# Patient Record
Sex: FEMALE | Race: ASIAN | NOT HISPANIC OR LATINO
[De-identification: names, ages, dates, MRNs, and addresses within clinical notes are randomized per-mention and may not be internally consistent; named-entity substitution may affect disease eponyms.]

---

## 2017-12-26 ENCOUNTER — APPOINTMENT (OUTPATIENT)
Dept: VASCULAR SURGERY | Facility: CLINIC | Age: 72
End: 2017-12-26
Payer: MEDICARE

## 2017-12-26 VITALS
DIASTOLIC BLOOD PRESSURE: 80 MMHG | SYSTOLIC BLOOD PRESSURE: 146 MMHG | TEMPERATURE: 98.5 F | HEART RATE: 66 BPM | BODY MASS INDEX: 40.75 KG/M2 | HEIGHT: 63 IN | WEIGHT: 230 LBS

## 2017-12-26 DIAGNOSIS — Z98.890 OTHER SPECIFIED POSTPROCEDURAL STATES: ICD-10-CM

## 2017-12-26 PROCEDURE — 93970 EXTREMITY STUDY: CPT

## 2017-12-26 PROCEDURE — 99204 OFFICE O/P NEW MOD 45 MIN: CPT

## 2017-12-26 RX ORDER — OMEPRAZOLE 20 MG/1
20 CAPSULE, DELAYED RELEASE ORAL
Qty: 90 | Refills: 0 | Status: ACTIVE | COMMUNITY
Start: 2017-08-07

## 2017-12-26 RX ORDER — BLOOD SUGAR DIAGNOSTIC
STRIP MISCELLANEOUS
Qty: 100 | Refills: 0 | Status: ACTIVE | COMMUNITY
Start: 2017-11-03

## 2017-12-26 RX ORDER — ACETAMINOPHEN AND CODEINE 300; 30 MG/1; MG/1
300-30 TABLET ORAL
Qty: 20 | Refills: 0 | Status: ACTIVE | COMMUNITY
Start: 2017-10-06

## 2017-12-26 RX ORDER — SIMVASTATIN 40 MG/1
40 TABLET, FILM COATED ORAL
Qty: 90 | Refills: 0 | Status: ACTIVE | COMMUNITY
Start: 2017-09-28

## 2017-12-26 RX ORDER — PIOGLITAZONE HYDROCHLORIDE 15 MG/1
15 TABLET ORAL
Qty: 90 | Refills: 0 | Status: ACTIVE | COMMUNITY
Start: 2017-08-07

## 2017-12-26 RX ORDER — GABAPENTIN 100 MG/1
100 CAPSULE ORAL
Qty: 30 | Refills: 0 | Status: ACTIVE | COMMUNITY
Start: 2017-06-28

## 2017-12-26 RX ORDER — LINAGLIPTIN 5 MG/1
5 TABLET, FILM COATED ORAL
Qty: 90 | Refills: 0 | Status: ACTIVE | COMMUNITY
Start: 2017-08-07

## 2017-12-26 RX ORDER — LANCETS 32 GAUGE
EACH MISCELLANEOUS
Qty: 100 | Refills: 0 | Status: ACTIVE | COMMUNITY
Start: 2017-08-07

## 2017-12-26 RX ORDER — CICLOPIROX 7.7 MG/G
0.77 GEL TOPICAL
Qty: 200 | Refills: 0 | Status: ACTIVE | COMMUNITY
Start: 2017-11-03

## 2017-12-26 RX ORDER — TETANUS TOXOID, REDUCED DIPHTHERIA TOXOID AND ACELLULAR PERTUSSIS VACCINE, ADSORBED 5; 2.5; 8; 8; 2.5 [IU]/.5ML; [IU]/.5ML; UG/.5ML; UG/.5ML; UG/.5ML
5-2.5-18.5 SUSPENSION INTRAMUSCULAR
Qty: 1 | Refills: 0 | Status: ACTIVE | COMMUNITY
Start: 2017-08-09

## 2017-12-26 RX ORDER — GLIMEPIRIDE 4 MG/1
4 TABLET ORAL
Qty: 90 | Refills: 0 | Status: ACTIVE | COMMUNITY
Start: 2017-08-07

## 2017-12-26 RX ORDER — ATENOLOL 25 MG/1
25 TABLET ORAL
Qty: 360 | Refills: 0 | Status: ACTIVE | COMMUNITY
Start: 2017-11-03

## 2017-12-26 RX ORDER — ISOPROPYL ALCOHOL 70 ML/100ML
70 SWAB TOPICAL
Qty: 100 | Refills: 0 | Status: ACTIVE | COMMUNITY
Start: 2017-08-07

## 2017-12-26 RX ORDER — AMMONIUM LACTATE 12 %
12 CREAM (GRAM) TOPICAL
Qty: 385 | Refills: 0 | Status: ACTIVE | COMMUNITY
Start: 2017-07-24

## 2017-12-26 RX ORDER — ATENOLOL 50 MG/1
50 TABLET ORAL
Qty: 180 | Refills: 0 | Status: ACTIVE | COMMUNITY
Start: 2017-08-07

## 2017-12-26 RX ORDER — LISINOPRIL 10 MG/1
10 TABLET ORAL
Qty: 90 | Refills: 0 | Status: ACTIVE | COMMUNITY
Start: 2017-08-07

## 2017-12-26 RX ORDER — DICLOFENAC SODIUM 10 MG/G
1 GEL TOPICAL
Qty: 100 | Refills: 0 | Status: ACTIVE | COMMUNITY
Start: 2017-08-04

## 2017-12-26 RX ORDER — ALBUTEROL SULFATE 90 UG/1
108 (90 BASE) AEROSOL, METERED RESPIRATORY (INHALATION)
Qty: 8 | Refills: 0 | Status: ACTIVE | COMMUNITY
Start: 2017-06-28

## 2017-12-26 RX ORDER — BLOOD-GLUCOSE METER
W/DEVICE KIT MISCELLANEOUS
Qty: 1 | Refills: 0 | Status: ACTIVE | COMMUNITY
Start: 2017-08-07

## 2017-12-26 RX ORDER — AMLODIPINE BESYLATE 2.5 MG/1
2.5 TABLET ORAL
Qty: 90 | Refills: 0 | Status: ACTIVE | COMMUNITY
Start: 2017-08-07

## 2017-12-26 RX ORDER — LIDOCAINE HYDROCHLORIDE 20 MG/ML
2 SOLUTION OROPHARYNGEAL
Qty: 30 | Refills: 0 | Status: ACTIVE | COMMUNITY
Start: 2017-08-28

## 2017-12-26 RX ORDER — HYDROCORTISONE 25 MG/G
2.5 CREAM TOPICAL
Qty: 85 | Refills: 0 | Status: ACTIVE | COMMUNITY
Start: 2017-08-07

## 2017-12-26 RX ORDER — METRONIDAZOLE 500 MG/1
500 TABLET ORAL
Qty: 21 | Refills: 0 | Status: ACTIVE | COMMUNITY
Start: 2017-08-17

## 2017-12-26 RX ORDER — METFORMIN HYDROCHLORIDE 1000 MG/1
1000 TABLET, COATED ORAL
Qty: 180 | Refills: 0 | Status: ACTIVE | COMMUNITY
Start: 2017-11-03

## 2018-01-24 ENCOUNTER — APPOINTMENT (OUTPATIENT)
Dept: VASCULAR SURGERY | Facility: CLINIC | Age: 73
End: 2018-01-24

## 2018-01-31 ENCOUNTER — APPOINTMENT (OUTPATIENT)
Dept: VASCULAR SURGERY | Facility: CLINIC | Age: 73
End: 2018-01-31
Payer: MEDICARE

## 2018-01-31 VITALS
DIASTOLIC BLOOD PRESSURE: 78 MMHG | HEIGHT: 63 IN | SYSTOLIC BLOOD PRESSURE: 175 MMHG | WEIGHT: 230 LBS | TEMPERATURE: 98 F | HEART RATE: 75 BPM | BODY MASS INDEX: 40.75 KG/M2

## 2018-01-31 DIAGNOSIS — G47.62 SLEEP RELATED LEG CRAMPS: ICD-10-CM

## 2018-01-31 PROCEDURE — 99214 OFFICE O/P EST MOD 30 MIN: CPT

## 2018-01-31 PROCEDURE — 93923 UPR/LXTR ART STDY 3+ LVLS: CPT

## 2018-03-30 ENCOUNTER — APPOINTMENT (OUTPATIENT)
Dept: VASCULAR SURGERY | Facility: CLINIC | Age: 73
End: 2018-03-30
Payer: MEDICARE

## 2018-03-30 VITALS
SYSTOLIC BLOOD PRESSURE: 150 MMHG | HEART RATE: 76 BPM | DIASTOLIC BLOOD PRESSURE: 78 MMHG | HEIGHT: 63 IN | TEMPERATURE: 97.7 F | BODY MASS INDEX: 37.21 KG/M2 | WEIGHT: 210 LBS

## 2018-03-30 PROCEDURE — 99214 OFFICE O/P EST MOD 30 MIN: CPT

## 2018-06-15 ENCOUNTER — APPOINTMENT (OUTPATIENT)
Dept: VASCULAR SURGERY | Facility: CLINIC | Age: 73
End: 2018-06-15
Payer: MEDICARE

## 2018-06-15 VITALS
TEMPERATURE: 98 F | SYSTOLIC BLOOD PRESSURE: 163 MMHG | HEART RATE: 73 BPM | DIASTOLIC BLOOD PRESSURE: 79 MMHG | WEIGHT: 210 LBS | BODY MASS INDEX: 37.21 KG/M2 | HEIGHT: 63 IN

## 2018-06-15 DIAGNOSIS — I83.813 VARICOSE VEINS OF BILATERAL LOWER EXTREMITIES WITH PAIN: ICD-10-CM

## 2018-06-15 PROCEDURE — 99214 OFFICE O/P EST MOD 30 MIN: CPT

## 2018-06-15 RX ORDER — METOPROLOL SUCCINATE 100 MG/1
100 TABLET, EXTENDED RELEASE ORAL
Qty: 90 | Refills: 0 | Status: ACTIVE | COMMUNITY
Start: 2018-04-23

## 2018-06-15 RX ORDER — AZITHROMYCIN 250 MG/1
250 TABLET, FILM COATED ORAL
Qty: 6 | Refills: 0 | Status: ACTIVE | COMMUNITY
Start: 2018-05-10

## 2018-06-18 PROBLEM — I83.813 PAIN DUE TO VARICOSE VEINS OF BOTH LOWER EXTREMITIES: Status: ACTIVE | Noted: 2017-12-26

## 2018-07-01 ENCOUNTER — OUTPATIENT (OUTPATIENT)
Dept: OUTPATIENT SERVICES | Facility: HOSPITAL | Age: 73
LOS: 1 days | End: 2018-07-01
Payer: MEDICARE

## 2018-07-01 DIAGNOSIS — Z90.49 ACQUIRED ABSENCE OF OTHER SPECIFIED PARTS OF DIGESTIVE TRACT: Chronic | ICD-10-CM

## 2018-07-09 DIAGNOSIS — Z71.89 OTHER SPECIFIED COUNSELING: ICD-10-CM

## 2019-01-11 ENCOUNTER — APPOINTMENT (OUTPATIENT)
Dept: VASCULAR SURGERY | Facility: CLINIC | Age: 74
End: 2019-01-11

## 2019-01-23 ENCOUNTER — RX RENEWAL (OUTPATIENT)
Age: 74
End: 2019-01-23

## 2019-01-25 ENCOUNTER — APPOINTMENT (OUTPATIENT)
Dept: VASCULAR SURGERY | Facility: CLINIC | Age: 74
End: 2019-01-25

## 2019-03-13 ENCOUNTER — APPOINTMENT (OUTPATIENT)
Dept: VASCULAR SURGERY | Facility: CLINIC | Age: 74
End: 2019-03-13
Payer: MEDICARE

## 2019-03-13 VITALS
DIASTOLIC BLOOD PRESSURE: 77 MMHG | BODY MASS INDEX: 34.55 KG/M2 | HEART RATE: 81 BPM | WEIGHT: 195 LBS | HEIGHT: 63 IN | TEMPERATURE: 98 F | SYSTOLIC BLOOD PRESSURE: 192 MMHG

## 2019-03-13 PROCEDURE — 93923 UPR/LXTR ART STDY 3+ LVLS: CPT

## 2019-03-13 PROCEDURE — 99214 OFFICE O/P EST MOD 30 MIN: CPT

## 2019-03-13 NOTE — ASSESSMENT
[Arterial/Venous Disease] : arterial/venous disease [Other: _____] : [unfilled] [Foot care/Footwear] : foot care/footwear [FreeTextEntry1] : Impression  symptomatic arterial and venous insuff w clinical improvement \par \par med Conserv management leg elevation, knee high comp stockings  15-20mm Hg, wt loss, diet control, exercise program, protective measures \par continue pletal 50 bid \par ov w lamin/pvr s/o art insuff  12 mo  3/2020\par ov 6mo prn \par \par Varicose veins are enlarged, twisted veins. Varicose veins are caused by increased blood pressure in the veins.  The blood moves towards the heart by 1-way valves in the veins. When the valves become weakened or damaged, blood can collect in the veins and pool in your lower legs (ankles). This causes the veins to become enlarged and incompetent with reflux. Sitting or standing for long periods can cause blood to pool in the leg veins, increasing the pressure within the veins. \par Risk factors for varicose veins or venous disease may include:  obesity, older age, standing or sitting for prolonged periods of time for several years, being female, pregnancy, taking oral contraceptive pills or hormone replacement, being inactive, and/or smoking. \par The most common symptoms of varicose veins are sensations in the legs, such as a heavy feeling, burning, and/or aching. However, each individual may experience symptoms differently.  Other symptoms may include:  color changes in the skin, sores on the legs, or rash.  Severe varicose veins or venous disease may eventually produce long-term mild swelling that can result in more serious skin and tissue problems, such as ulcers and non-healing sores.\par Varicose veins and venous disease are diagnosed by a complete medical history, physical examination, and diagnostic studies for varicose veins including duplex ultrasound and color-flow imaging.  \par Medical treatment for varicose veins and venous disease include:  compression stockings, sclerotherapy, endovenous ablation and/or surgical treatment with microphlebectomy.  \par \par  [Medication Management] : medication management

## 2019-03-13 NOTE — DATA REVIEWED
[FreeTextEntry1] : 12/26/2017 venous doppler Mikey le no acute dvt svt RLE GSV  ablated  LLE no sono evidence of reflux \par \par 1/31/2018 ROBBIE/PVR mild infragenic art insuff w vessel calcification rt robbie NC lt robbie NC \par \par 3/13/2019 ROBBIE/PVR mild infragenic art insuff w vessel calcification rt robbie NC lt robbie 1.17

## 2019-03-13 NOTE — PHYSICAL EXAM
[Normal Breath Sounds] : Normal breath sounds [Right Carotid Bruit] : right carotid bruit heard [2+] : left 2+ [1+] : left 1+ [Ankle Swelling (On Exam)] : present [Ankle Swelling Bilaterally] : bilaterally  [Varicose Veins Of Lower Extremities] : bilaterally [] : bilaterally [Ankle Swelling On The Right] : mild [No HSM] : no hepatosplenomegaly [No Rash or Lesion] : No rash or lesion [Alert] : alert [Oriented to Person] : oriented to person [Oriented to Place] : oriented to place [Oriented to Time] : oriented to time [Calm] : calm [JVD] : no jugular venous distention  [Abdomen Masses] : No abdominal masses [Tender] : was nontender [Stool Sample Taken] : No stool obtained  on rectal exam [de-identified] : nad [de-identified] : wnl [FreeTextEntry1] : mod art insuff w mod trophic skin changes\par no ulcers\par mild to mod venous insuff\par w mild to  moderate bilateral leg stasis dermatitis \par and mild  bilateral leg edema \par Multiple  bilateral leg small  varicose  veins and spider veins calf and shin\par no wounds/ulcers\par  [de-identified] : wnl [de-identified] : wnl [de-identified] : cn 2-12 elisha grossly intact [de-identified] : cooperative

## 2019-03-13 NOTE — HISTORY OF PRESENT ILLNESS
[FreeTextEntry1] : pt states elisha le noct leg and foot  cramps have  sig improved and nearly resolved  in intensity  since last ov\par toes color has also improved \par pt is compliant w comp stockings\par pt is on pletal  50 mg bid

## 2019-05-30 ENCOUNTER — EMERGENCY (EMERGENCY)
Facility: HOSPITAL | Age: 74
LOS: 1 days | Discharge: HOME | End: 2019-05-30
Attending: EMERGENCY MEDICINE
Payer: COMMERCIAL

## 2019-05-30 VITALS
WEIGHT: 195.11 LBS | OXYGEN SATURATION: 98 % | TEMPERATURE: 97 F | DIASTOLIC BLOOD PRESSURE: 80 MMHG | HEART RATE: 84 BPM | RESPIRATION RATE: 16 BRPM | SYSTOLIC BLOOD PRESSURE: 203 MMHG

## 2019-05-30 DIAGNOSIS — Z90.49 ACQUIRED ABSENCE OF OTHER SPECIFIED PARTS OF DIGESTIVE TRACT: Chronic | ICD-10-CM

## 2019-05-30 LAB
ALBUMIN SERPL ELPH-MCNC: 3.1 G/DL — LOW (ref 3.5–5)
ALP SERPL-CCNC: 61 U/L — SIGNIFICANT CHANGE UP (ref 40–120)
ALT FLD-CCNC: 20 U/L DA — SIGNIFICANT CHANGE UP (ref 10–60)
ANION GAP SERPL CALC-SCNC: 8 MMOL/L — SIGNIFICANT CHANGE UP (ref 5–17)
AST SERPL-CCNC: 22 U/L — SIGNIFICANT CHANGE UP (ref 10–40)
BASOPHILS # BLD AUTO: 0.05 K/UL — SIGNIFICANT CHANGE UP (ref 0–0.2)
BASOPHILS NFR BLD AUTO: 0.4 % — SIGNIFICANT CHANGE UP (ref 0–2)
BILIRUB SERPL-MCNC: 0.4 MG/DL — SIGNIFICANT CHANGE UP (ref 0.2–1.2)
BUN SERPL-MCNC: 15 MG/DL — SIGNIFICANT CHANGE UP (ref 7–18)
CALCIUM SERPL-MCNC: 8.4 MG/DL — SIGNIFICANT CHANGE UP (ref 8.4–10.5)
CHLORIDE SERPL-SCNC: 98 MMOL/L — SIGNIFICANT CHANGE UP (ref 96–108)
CK SERPL-CCNC: 70 U/L — SIGNIFICANT CHANGE UP (ref 21–215)
CO2 SERPL-SCNC: 27 MMOL/L — SIGNIFICANT CHANGE UP (ref 22–31)
CREAT SERPL-MCNC: 0.94 MG/DL — SIGNIFICANT CHANGE UP (ref 0.5–1.3)
EOSINOPHIL # BLD AUTO: 0.03 K/UL — SIGNIFICANT CHANGE UP (ref 0–0.5)
EOSINOPHIL NFR BLD AUTO: 0.2 % — SIGNIFICANT CHANGE UP (ref 0–6)
GLUCOSE SERPL-MCNC: 261 MG/DL — HIGH (ref 70–99)
HCT VFR BLD CALC: 35.2 % — SIGNIFICANT CHANGE UP (ref 34.5–45)
HGB BLD-MCNC: 11.1 G/DL — LOW (ref 11.5–15.5)
IMM GRANULOCYTES NFR BLD AUTO: 0.8 % — SIGNIFICANT CHANGE UP (ref 0–1.5)
LIDOCAIN IGE QN: 164 U/L — SIGNIFICANT CHANGE UP (ref 73–393)
LYMPHOCYTES # BLD AUTO: 1.4 K/UL — SIGNIFICANT CHANGE UP (ref 1–3.3)
LYMPHOCYTES # BLD AUTO: 11.1 % — LOW (ref 13–44)
MAGNESIUM SERPL-MCNC: 1.2 MG/DL — LOW (ref 1.6–2.6)
MCHC RBC-ENTMCNC: 26.6 PG — LOW (ref 27–34)
MCHC RBC-ENTMCNC: 31.5 GM/DL — LOW (ref 32–36)
MCV RBC AUTO: 84.4 FL — SIGNIFICANT CHANGE UP (ref 80–100)
MONOCYTES # BLD AUTO: 0.54 K/UL — SIGNIFICANT CHANGE UP (ref 0–0.9)
MONOCYTES NFR BLD AUTO: 4.3 % — SIGNIFICANT CHANGE UP (ref 2–14)
NEUTROPHILS # BLD AUTO: 10.53 K/UL — HIGH (ref 1.8–7.4)
NEUTROPHILS NFR BLD AUTO: 83.2 % — HIGH (ref 43–77)
NRBC # BLD: 0 /100 WBCS — SIGNIFICANT CHANGE UP (ref 0–0)
PLATELET # BLD AUTO: 326 K/UL — SIGNIFICANT CHANGE UP (ref 150–400)
POTASSIUM SERPL-MCNC: 5.2 MMOL/L — SIGNIFICANT CHANGE UP (ref 3.5–5.3)
POTASSIUM SERPL-SCNC: 5.2 MMOL/L — SIGNIFICANT CHANGE UP (ref 3.5–5.3)
PROT SERPL-MCNC: 7.4 G/DL — SIGNIFICANT CHANGE UP (ref 6–8.3)
RBC # BLD: 4.17 M/UL — SIGNIFICANT CHANGE UP (ref 3.8–5.2)
RBC # FLD: 13.2 % — SIGNIFICANT CHANGE UP (ref 10.3–14.5)
SODIUM SERPL-SCNC: 133 MMOL/L — LOW (ref 135–145)
TROPONIN I SERPL-MCNC: <0.015 NG/ML — SIGNIFICANT CHANGE UP (ref 0–0.04)
WBC # BLD: 12.65 K/UL — HIGH (ref 3.8–10.5)
WBC # FLD AUTO: 12.65 K/UL — HIGH (ref 3.8–10.5)

## 2019-05-30 PROCEDURE — 99284 EMERGENCY DEPT VISIT MOD MDM: CPT

## 2019-05-30 RX ORDER — SODIUM CHLORIDE 9 MG/ML
1000 INJECTION INTRAMUSCULAR; INTRAVENOUS; SUBCUTANEOUS ONCE
Refills: 0 | Status: COMPLETED | OUTPATIENT
Start: 2019-05-30 | End: 2019-05-30

## 2019-05-30 RX ORDER — ONDANSETRON 8 MG/1
4 TABLET, FILM COATED ORAL ONCE
Refills: 0 | Status: COMPLETED | OUTPATIENT
Start: 2019-05-30 | End: 2019-05-30

## 2019-05-30 RX ADMIN — SODIUM CHLORIDE 1000 MILLILITER(S): 9 INJECTION INTRAMUSCULAR; INTRAVENOUS; SUBCUTANEOUS at 21:49

## 2019-05-30 RX ADMIN — ONDANSETRON 4 MILLIGRAM(S): 8 TABLET, FILM COATED ORAL at 22:30

## 2019-05-30 RX ADMIN — SODIUM CHLORIDE 1000 MILLILITER(S): 9 INJECTION INTRAMUSCULAR; INTRAVENOUS; SUBCUTANEOUS at 22:31

## 2019-05-30 RX ADMIN — SODIUM CHLORIDE 1000 MILLILITER(S): 9 INJECTION INTRAMUSCULAR; INTRAVENOUS; SUBCUTANEOUS at 23:00

## 2019-05-30 NOTE — ED PROVIDER NOTE - OBJECTIVE STATEMENT
74 year old female PMH HTN, HLD, DM coming in for 1 day of vague generalized abdominal pain, nausea, 6 episodes of nbnb vomiting, and many episodes of watery nonbloody diarrhea. states unable to tolerate PO and wasn't able to take her medications 2/2 N/V. Denies fevers, chills, sweats, cp, sob, palpitations, urinary complaints. states feels weak. surgical history of hysterectomy, cholecystectomy, hernia repair.

## 2019-05-30 NOTE — ED ADULT TRIAGE NOTE - TEMPERATURE IN CELSIUS (DEGREES C)
36.3 Problem: Patient Care Overview (Adult)  Goal: Plan of Care Review  Outcome: Ongoing (interventions implemented as appropriate)    12/17/17 0696   Coping/Psychosocial Response Interventions   Plan Of Care Reviewed With patient;spouse   Patient Care Overview   Progress improving   Outcome Evaluation   Outcome Summary/Follow up Plan Pt admitted from ER for DKA. Insulin gtt titrated per protocol, potassium replaced per protocol. VSS, CTM.

## 2019-05-30 NOTE — ED PROVIDER NOTE - CLINICAL SUMMARY MEDICAL DECISION MAKING FREE TEXT BOX
74 year old female with abd pain and N/V/D. hypertensive. other vitals WNL. Pe as above.  labs, ua, zofran, fluid bolus, ct abdomen, reassess 74 year old female with abd pain and N/V/D. hypertensive. other vitals WNL. Pe as above.  labs, ua, zofran, fluid bolus, ct abdomen, reassess  labs are unremarkable. ct abdomen with enteritis. after fluid bolus, zofran feels improved. tolerating PO solids and liquids in the ed. symptoms likely 2/2 gastroenteritis. will dc home. f/u with PMD. return precautions given.

## 2019-05-31 VITALS
DIASTOLIC BLOOD PRESSURE: 54 MMHG | OXYGEN SATURATION: 100 % | RESPIRATION RATE: 16 BRPM | SYSTOLIC BLOOD PRESSURE: 121 MMHG | TEMPERATURE: 98 F | HEART RATE: 90 BPM

## 2019-05-31 PROCEDURE — 82550 ASSAY OF CK (CPK): CPT

## 2019-05-31 PROCEDURE — 99284 EMERGENCY DEPT VISIT MOD MDM: CPT | Mod: 25

## 2019-05-31 PROCEDURE — 84484 ASSAY OF TROPONIN QUANT: CPT

## 2019-05-31 PROCEDURE — 96374 THER/PROPH/DIAG INJ IV PUSH: CPT | Mod: XU

## 2019-05-31 PROCEDURE — 96361 HYDRATE IV INFUSION ADD-ON: CPT

## 2019-05-31 PROCEDURE — 74177 CT ABD & PELVIS W/CONTRAST: CPT

## 2019-05-31 PROCEDURE — 85027 COMPLETE CBC AUTOMATED: CPT

## 2019-05-31 PROCEDURE — 80053 COMPREHEN METABOLIC PANEL: CPT

## 2019-05-31 PROCEDURE — 36415 COLL VENOUS BLD VENIPUNCTURE: CPT

## 2019-05-31 PROCEDURE — 83735 ASSAY OF MAGNESIUM: CPT

## 2019-05-31 PROCEDURE — 74177 CT ABD & PELVIS W/CONTRAST: CPT | Mod: 26

## 2019-05-31 PROCEDURE — 83690 ASSAY OF LIPASE: CPT

## 2019-05-31 RX ADMIN — SODIUM CHLORIDE 1000 MILLILITER(S): 9 INJECTION INTRAMUSCULAR; INTRAVENOUS; SUBCUTANEOUS at 03:02

## 2019-08-28 ENCOUNTER — APPOINTMENT (OUTPATIENT)
Dept: VASCULAR SURGERY | Facility: CLINIC | Age: 74
End: 2019-08-28
Payer: MEDICARE

## 2019-08-28 VITALS
WEIGHT: 186 LBS | HEIGHT: 63 IN | TEMPERATURE: 98 F | BODY MASS INDEX: 32.96 KG/M2 | SYSTOLIC BLOOD PRESSURE: 168 MMHG | HEART RATE: 70 BPM | DIASTOLIC BLOOD PRESSURE: 77 MMHG

## 2019-08-28 PROCEDURE — 99214 OFFICE O/P EST MOD 30 MIN: CPT

## 2019-08-28 RX ORDER — CIPROFLOXACIN HYDROCHLORIDE 500 MG/1
500 TABLET, FILM COATED ORAL
Qty: 10 | Refills: 1 | Status: ACTIVE | COMMUNITY
Start: 2019-08-28 | End: 1900-01-01

## 2019-08-28 NOTE — PHYSICAL EXAM
[Normal Breath Sounds] : Normal breath sounds [Right Carotid Bruit] : right carotid bruit heard [2+] : left 2+ [1+] : left 1+ [Ankle Swelling (On Exam)] : present [Ankle Swelling Bilaterally] : bilaterally  [Varicose Veins Of Lower Extremities] : bilaterally [] : bilaterally [Ankle Swelling On The Right] : mild [No HSM] : no hepatosplenomegaly [No Rash or Lesion] : No rash or lesion [Oriented to Person] : oriented to person [Alert] : alert [Oriented to Place] : oriented to place [Oriented to Time] : oriented to time [Calm] : calm [JVD] : no jugular venous distention  [Abdomen Masses] : No abdominal masses [Tender] : was nontender [Stool Sample Taken] : No stool obtained  on rectal exam [de-identified] : nad [de-identified] : wnl [FreeTextEntry1] : mod art insuff w mod trophic skin changes\par rt toe 1 tip ulcer 3mm diam w minimal serous draiange \par mild to mod venous insuff\par w mild to  moderate bilateral leg stasis dermatitis \par and mild  bilateral leg edema \par Multiple  bilateral leg small  varicose  veins and spider veins calf and shin\par  [de-identified] : cn 2-12 elisha grossly intact [de-identified] : wnl [de-identified] : wnl [de-identified] : cooperative

## 2019-08-28 NOTE — HISTORY OF PRESENT ILLNESS
[FreeTextEntry1] : pt states elisha le noct leg and foot  cramps have  sig improved and nearly resolved  in intensity  since last ov\par toes color has also improved \par pt is compliant w comp stockings\par pt is on pletal  50 mg bid  [de-identified] : pt states 3 weeks ago rt toe 1 tip debridment by podiatrist and that it is feeling better\par pt is receiving woundcare by podiatrist\par pt reports mild discomfort\par pt has completed po cipro  rx \par pt is on pletal 50 bid

## 2019-08-28 NOTE — ASSESSMENT
[Arterial/Venous Disease] : arterial/venous disease [Medication Management] : medication management [Other: _____] : [unfilled] [Foot care/Footwear] : foot care/footwear [FreeTextEntry1] : Impression  symptomatic arterial w new rt toe 1 ulcer \par \par med Conserv management leg elevation, knee high comp stockings  15-20mm Hg, wt loss, diet control, exercise program, protective measures \par continue pletal 50 bid \par restart po cipro for an additional 7 days \par ov w lamin/pvr s/o art insuff  12 mo  3/2020\par ov 1 week to re eval\par Indications risks and benefits of  le angiogram, possible baloon angioplasty and stent, leg bypass were explained to pt who understands\par d/w pt that i will re eval rt toe wound and based on this will d/w pt  above angio indications \par \par \par Varicose veins are enlarged, twisted veins. Varicose veins are caused by increased blood pressure in the veins.  The blood moves towards the heart by 1-way valves in the veins. When the valves become weakened or damaged, blood can collect in the veins and pool in your lower legs (ankles). This causes the veins to become enlarged and incompetent with reflux. Sitting or standing for long periods can cause blood to pool in the leg veins, increasing the pressure within the veins. \par Risk factors for varicose veins or venous disease may include:  obesity, older age, standing or sitting for prolonged periods of time for several years, being female, pregnancy, taking oral contraceptive pills or hormone replacement, being inactive, and/or smoking. \par The most common symptoms of varicose veins are sensations in the legs, such as a heavy feeling, burning, and/or aching. However, each individual may experience symptoms differently.  Other symptoms may include:  color changes in the skin, sores on the legs, or rash.  Severe varicose veins or venous disease may eventually produce long-term mild swelling that can result in more serious skin and tissue problems, such as ulcers and non-healing sores.\par Varicose veins and venous disease are diagnosed by a complete medical history, physical examination, and diagnostic studies for varicose veins including duplex ultrasound and color-flow imaging.  \par Medical treatment for varicose veins and venous disease include:  compression stockings, sclerotherapy, endovenous ablation and/or surgical treatment with microphlebectomy.  \par \par

## 2019-09-06 ENCOUNTER — APPOINTMENT (OUTPATIENT)
Dept: VASCULAR SURGERY | Facility: CLINIC | Age: 74
End: 2019-09-06
Payer: MEDICARE

## 2019-09-06 VITALS
HEART RATE: 80 BPM | SYSTOLIC BLOOD PRESSURE: 160 MMHG | WEIGHT: 186 LBS | HEIGHT: 63 IN | BODY MASS INDEX: 32.96 KG/M2 | DIASTOLIC BLOOD PRESSURE: 80 MMHG | TEMPERATURE: 98.1 F

## 2019-09-06 PROCEDURE — 99214 OFFICE O/P EST MOD 30 MIN: CPT

## 2019-09-06 NOTE — ASSESSMENT
[Arterial/Venous Disease] : arterial/venous disease [Medication Management] : medication management [Other: _____] : [unfilled] [Foot care/Footwear] : foot care/footwear [FreeTextEntry1] : Impression  symptomatic arterial  insuff w healed wound \par \par med Conserv management leg elevation, knee high comp stockings  15-20mm Hg, wt loss, diet control, exercise program, protective measures \par at this time le angio is not indicated ,lina hold off \par continue pletal 50 bid \par ov w lamin/pvr s/o art insuff  12 mo  3/2020\par ov 2-3 mo to re eval\par rto if any new wounds \par \par \par \par Varicose veins are enlarged, twisted veins. Varicose veins are caused by increased blood pressure in the veins.  The blood moves towards the heart by 1-way valves in the veins. When the valves become weakened or damaged, blood can collect in the veins and pool in your lower legs (ankles). This causes the veins to become enlarged and incompetent with reflux. Sitting or standing for long periods can cause blood to pool in the leg veins, increasing the pressure within the veins. \par Risk factors for varicose veins or venous disease may include:  obesity, older age, standing or sitting for prolonged periods of time for several years, being female, pregnancy, taking oral contraceptive pills or hormone replacement, being inactive, and/or smoking. \par The most common symptoms of varicose veins are sensations in the legs, such as a heavy feeling, burning, and/or aching. However, each individual may experience symptoms differently.  Other symptoms may include:  color changes in the skin, sores on the legs, or rash.  Severe varicose veins or venous disease may eventually produce long-term mild swelling that can result in more serious skin and tissue problems, such as ulcers and non-healing sores.\par Varicose veins and venous disease are diagnosed by a complete medical history, physical examination, and diagnostic studies for varicose veins including duplex ultrasound and color-flow imaging.  \par Medical treatment for varicose veins and venous disease include:  compression stockings, sclerotherapy, endovenous ablation and/or surgical treatment with microphlebectomy.  \par \par

## 2019-09-06 NOTE — PHYSICAL EXAM
[Normal Breath Sounds] : Normal breath sounds [Right Carotid Bruit] : right carotid bruit heard [2+] : left 2+ [1+] : left 1+ [Ankle Swelling (On Exam)] : present [Ankle Swelling Bilaterally] : bilaterally  [Varicose Veins Of Lower Extremities] : bilaterally [] : bilaterally [Ankle Swelling On The Right] : mild [No HSM] : no hepatosplenomegaly [No Rash or Lesion] : No rash or lesion [Alert] : alert [Oriented to Person] : oriented to person [Oriented to Place] : oriented to place [Oriented to Time] : oriented to time [Calm] : calm [JVD] : no jugular venous distention  [Abdomen Masses] : No abdominal masses [Tender] : was nontender [Stool Sample Taken] : No stool obtained  on rectal exam [de-identified] : nad [de-identified] : wnl [FreeTextEntry1] : mod art insuff w mod trophic skin changes\par rt toe 1 tip ulcer healed \par mild to mod venous insuff\par w mild to  moderate bilateral leg stasis dermatitis \par and mild  bilateral leg edema \par Multiple  bilateral leg small  varicose  veins and spider veins calf and shin\par  [de-identified] : wnl [de-identified] : wnl [de-identified] : cn 2-12 elisha grossly intact [de-identified] : cooperative

## 2019-11-08 ENCOUNTER — APPOINTMENT (OUTPATIENT)
Dept: VASCULAR SURGERY | Facility: CLINIC | Age: 74
End: 2019-11-08

## 2020-01-06 ENCOUNTER — RX RENEWAL (OUTPATIENT)
Age: 75
End: 2020-01-06

## 2020-01-06 RX ORDER — CILOSTAZOL 50 MG/1
50 TABLET ORAL
Qty: 180 | Refills: 3 | Status: ACTIVE | COMMUNITY
Start: 2018-01-31 | End: 1900-01-01

## 2020-01-09 ENCOUNTER — APPOINTMENT (OUTPATIENT)
Dept: CT IMAGING | Facility: IMAGING CENTER | Age: 75
End: 2020-01-09

## 2020-01-10 RX ORDER — CILOSTAZOL 50 MG/1
50 TABLET ORAL
Qty: 180 | Refills: 3 | Status: ACTIVE | COMMUNITY
Start: 2020-01-10 | End: 1900-01-01

## 2020-01-22 ENCOUNTER — OUTPATIENT (OUTPATIENT)
Dept: OUTPATIENT SERVICES | Facility: HOSPITAL | Age: 75
LOS: 1 days | End: 2020-01-22
Payer: COMMERCIAL

## 2020-01-22 ENCOUNTER — APPOINTMENT (OUTPATIENT)
Dept: CT IMAGING | Facility: IMAGING CENTER | Age: 75
End: 2020-01-22
Payer: MEDICARE

## 2020-01-22 DIAGNOSIS — Z90.49 ACQUIRED ABSENCE OF OTHER SPECIFIED PARTS OF DIGESTIVE TRACT: Chronic | ICD-10-CM

## 2020-01-22 DIAGNOSIS — Z00.8 ENCOUNTER FOR OTHER GENERAL EXAMINATION: ICD-10-CM

## 2020-01-22 PROCEDURE — 74177 CT ABD & PELVIS W/CONTRAST: CPT

## 2020-01-22 PROCEDURE — 82565 ASSAY OF CREATININE: CPT

## 2020-01-22 PROCEDURE — 74177 CT ABD & PELVIS W/CONTRAST: CPT | Mod: 26

## 2020-06-03 ENCOUNTER — APPOINTMENT (OUTPATIENT)
Dept: VASCULAR SURGERY | Facility: CLINIC | Age: 75
End: 2020-06-03

## 2020-06-22 ENCOUNTER — APPOINTMENT (OUTPATIENT)
Dept: VASCULAR SURGERY | Facility: CLINIC | Age: 75
End: 2020-06-22
Payer: MEDICARE

## 2020-06-22 PROCEDURE — 93923 UPR/LXTR ART STDY 3+ LVLS: CPT

## 2020-06-24 ENCOUNTER — APPOINTMENT (OUTPATIENT)
Dept: VASCULAR SURGERY | Facility: CLINIC | Age: 75
End: 2020-06-24
Payer: MEDICARE

## 2020-06-24 PROCEDURE — 99213 OFFICE O/P EST LOW 20 MIN: CPT

## 2020-06-24 RX ORDER — CILOSTAZOL 50 MG/1
50 TABLET ORAL
Qty: 180 | Refills: 3 | Status: ACTIVE | COMMUNITY
Start: 2020-06-24 | End: 1900-01-01

## 2020-06-24 NOTE — ASSESSMENT
[FreeTextEntry1] : Impression  symptomatic arterial  insuff w clinical improvement , stable venous insff sx\par \par med Conserv management leg elevation, knee high comp stockings  15-20mm Hg, wt loss, diet control, exercise program, protective measures \par continue pletal 50 bid \par ov w lamin/pvr s/o art insuff  12 mo  6/2021\par ov 6mo dec 2020 \par telephonic visit 10 min \par \par \par Varicose veins are enlarged, twisted veins. Varicose veins are caused by increased blood pressure in the veins.  The blood moves towards the heart by 1-way valves in the veins. When the valves become weakened or damaged, blood can collect in the veins and pool in your lower legs (ankles). This causes the veins to become enlarged and incompetent with reflux. Sitting or standing for long periods can cause blood to pool in the leg veins, increasing the pressure within the veins. \par Risk factors for varicose veins or venous disease may include:  obesity, older age, standing or sitting for prolonged periods of time for several years, being female, pregnancy, taking oral contraceptive pills or hormone replacement, being inactive, and/or smoking. \par The most common symptoms of varicose veins are sensations in the legs, such as a heavy feeling, burning, and/or aching. However, each individual may experience symptoms differently.  Other symptoms may include:  color changes in the skin, sores on the legs, or rash.  Severe varicose veins or venous disease may eventually produce long-term mild swelling that can result in more serious skin and tissue problems, such as ulcers and non-healing sores.\par Varicose veins and venous disease are diagnosed by a complete medical history, physical examination, and diagnostic studies for varicose veins including duplex ultrasound and color-flow imaging.  \par Medical treatment for varicose veins and venous disease include:  compression stockings, sclerotherapy, endovenous ablation and/or surgical treatment with microphlebectomy.  \par \par  [Arterial/Venous Disease] : arterial/venous disease [Other: _____] : [unfilled] [Medication Management] : medication management [Foot care/Footwear] : foot care/footwear

## 2020-06-24 NOTE — DATA REVIEWED
[FreeTextEntry1] : 12/26/2017 venous doppler Mikey le no acute dvt svt RLE GSV  ablated  LLE no sono evidence of reflux \par \par 1/31/2018 ROBBIE/PVR mild infragenic art insuff w vessel calcification rt robbie NC lt robbie NC \par \par 3/13/2019 ROBBIE/PVR mild infragenic art insuff w vessel calcification rt robbie NC lt robbie 1.17\par \par 6/22/2020 ROBBIE/PVR mild infragenic art insuff w vessel calcification RT robbie NC LT robbie 1.22\par

## 2020-06-24 NOTE — HISTORY OF PRESENT ILLNESS
[FreeTextEntry1] : pt is on pletal 50 bid\par pt states elisha le and foot nocturnal  cramps have sig improved \par pt is compliant w comp stockings\par pt states no le swelling or discomfort  c/o at this time

## 2020-06-24 NOTE — REASON FOR VISIT
[Home] : at home, [unfilled] , at the time of the visit. [Medical Office: ([facility])___] : at the medical office located in  [Other:____] : [unfilled] [Verbal consent obtained from patient] : the patient, [unfilled] [FreeTextEntry1] : My legs bother me

## 2020-12-29 ENCOUNTER — APPOINTMENT (OUTPATIENT)
Dept: VASCULAR SURGERY | Facility: CLINIC | Age: 75
End: 2020-12-29
Payer: MEDICARE

## 2020-12-29 VITALS
BODY MASS INDEX: 32.78 KG/M2 | HEART RATE: 83 BPM | DIASTOLIC BLOOD PRESSURE: 78 MMHG | HEIGHT: 63 IN | SYSTOLIC BLOOD PRESSURE: 186 MMHG | TEMPERATURE: 98 F | WEIGHT: 185 LBS

## 2020-12-29 VITALS — DIASTOLIC BLOOD PRESSURE: 79 MMHG | SYSTOLIC BLOOD PRESSURE: 158 MMHG | HEART RATE: 82 BPM

## 2020-12-29 PROCEDURE — 99214 OFFICE O/P EST MOD 30 MIN: CPT

## 2020-12-29 PROCEDURE — 99072 ADDL SUPL MATRL&STAF TM PHE: CPT

## 2020-12-29 RX ORDER — MULTIVITAMIN WITH FOLIC ACID 400 MCG
TABLET ORAL
Qty: 90 | Refills: 0 | Status: ACTIVE | COMMUNITY
Start: 2020-12-18

## 2020-12-29 RX ORDER — LORATADINE 10 MG/1
10 TABLET ORAL
Qty: 30 | Refills: 0 | Status: ACTIVE | COMMUNITY
Start: 2020-12-18

## 2020-12-29 RX ORDER — CICLOPIROX OLAMINE 7.7 MG/G
0.77 CREAM TOPICAL
Qty: 180 | Refills: 0 | Status: ACTIVE | COMMUNITY
Start: 2020-12-18

## 2020-12-29 RX ORDER — CILOSTAZOL 50 MG/1
50 TABLET ORAL
Qty: 180 | Refills: 3 | Status: ACTIVE | COMMUNITY
Start: 2020-12-29 | End: 1900-01-01

## 2020-12-29 RX ORDER — CALCIUM CARBONATE/VITAMIN D3 500MG-5MCG
500-200 TABLET ORAL
Qty: 180 | Refills: 0 | Status: ACTIVE | COMMUNITY
Start: 2020-12-18

## 2020-12-29 RX ORDER — MUPIROCIN 20 MG/G
2 OINTMENT TOPICAL
Qty: 22 | Refills: 0 | Status: ACTIVE | COMMUNITY
Start: 2020-10-12

## 2020-12-29 NOTE — ASSESSMENT
[Arterial/Venous Disease] : arterial/venous disease [Medication Management] : medication management [Other: _____] : [unfilled] [Foot care/Footwear] : foot care/footwear [FreeTextEntry1] : Impression  symptomatic arterial  insuff w clinical improvement , stable venous insff sx\par \par med Conserv management leg elevation, knee high comp stockings  15-20mm Hg, wt loss, diet control, exercise program, protective measures \par continue pletal 50 bid \par ov w lamin/pvr s/o art insuff  12 mo  6/2021\par \par \par Varicose veins are enlarged, twisted veins. Varicose veins are caused by increased blood pressure in the veins.  The blood moves towards the heart by 1-way valves in the veins. When the valves become weakened or damaged, blood can collect in the veins and pool in your lower legs (ankles). This causes the veins to become enlarged and incompetent with reflux. Sitting or standing for long periods can cause blood to pool in the leg veins, increasing the pressure within the veins. \par Risk factors for varicose veins or venous disease may include:  obesity, older age, standing or sitting for prolonged periods of time for several years, being female, pregnancy, taking oral contraceptive pills or hormone replacement, being inactive, and/or smoking. \par The most common symptoms of varicose veins are sensations in the legs, such as a heavy feeling, burning, and/or aching. However, each individual may experience symptoms differently.  Other symptoms may include:  color changes in the skin, sores on the legs, or rash.  Severe varicose veins or venous disease may eventually produce long-term mild swelling that can result in more serious skin and tissue problems, such as ulcers and non-healing sores.\par Varicose veins and venous disease are diagnosed by a complete medical history, physical examination, and diagnostic studies for varicose veins including duplex ultrasound and color-flow imaging.  \par Medical treatment for varicose veins and venous disease include:  compression stockings, sclerotherapy, endovenous ablation and/or surgical treatment with microphlebectomy.  \par \par

## 2020-12-29 NOTE — HISTORY OF PRESENT ILLNESS
[FreeTextEntry1] : pt is on pletal 50 bid\par pt states elisha le and foot nocturnal  cramps have sig improved \par pt is compliant w comp stockings\par pt states no le swelling or discomfort  c/o at this time  [de-identified] : pt is on pletal 50 bid\par pt states elisha le and foot nocturnal  cramps have sig improved  and nearly resolved\par pt is compliant w comp stockings\par pt states minimal le swelling or discomfort since last ov

## 2020-12-29 NOTE — REASON FOR VISIT
[Home] : at home, [unfilled] , at the time of the visit. [Medical Office: ([facility])___] : at the medical office located in  [Other:____] : [unfilled] [Verbal consent obtained from patient] : the patient, [unfilled] [FreeTextEntry1] : My legs bother me less

## 2020-12-29 NOTE — PHYSICAL EXAM
[JVD] : no jugular venous distention  [Normal Breath Sounds] : Normal breath sounds [Right Carotid Bruit] : no bruit heard over the right carotid [Left Carotid Bruit] : no bruit heard over the left carotid [2+] : left 2+ [1+] : left 1+ [Ankle Swelling (On Exam)] : present [Ankle Swelling Bilaterally] : bilaterally  [Varicose Veins Of Lower Extremities] : bilaterally [] : bilaterally [Ankle Swelling On The Right] : mild [Abdomen Masses] : No abdominal masses [No HSM] : no hepatosplenomegaly [Tender] : was nontender [Stool Sample Taken] : No stool obtained  on rectal exam [No Rash or Lesion] : No rash or lesion [Alert] : alert [Oriented to Person] : oriented to person [Oriented to Place] : oriented to place [Oriented to Time] : oriented to time [Calm] : calm [de-identified] : nad [de-identified] : wnl [FreeTextEntry1] : Mild arterial insufficiency w mild  trophic skin changes \par Mild bilateral leg venous insufficiency \par w moderate bilateral leg stasis dermatitis, hyperpigmentation in the gator area, lipodermatosclerosis, hyperkeratosis (skin thickening)\par and mild to moderate bilateral leg edema \par Multiple  bilateral leg small varicose  veins and spider veins  ant post medial calf and shin \par RLE Varicose veins measuring 1-2  mm in size on the calf /shin \par LLE Varicose veins measuring 1-2  mm in size on the calf /shin \par no wounds/ulcers\par  [de-identified] : wnl [de-identified] : wnl [de-identified] : Mikey Cranial nerves 2-12 mikey grossly intact [de-identified] : cooperative

## 2021-07-06 ENCOUNTER — APPOINTMENT (OUTPATIENT)
Dept: VASCULAR SURGERY | Facility: CLINIC | Age: 76
End: 2021-07-06
Payer: MEDICARE

## 2021-07-06 VITALS
BODY MASS INDEX: 35.08 KG/M2 | HEIGHT: 63 IN | SYSTOLIC BLOOD PRESSURE: 153 MMHG | TEMPERATURE: 97.3 F | WEIGHT: 198 LBS | HEART RATE: 87 BPM | DIASTOLIC BLOOD PRESSURE: 76 MMHG

## 2021-07-06 PROCEDURE — 99214 OFFICE O/P EST MOD 30 MIN: CPT

## 2021-07-06 PROCEDURE — 93923 UPR/LXTR ART STDY 3+ LVLS: CPT

## 2021-07-06 RX ORDER — CILOSTAZOL 50 MG/1
50 TABLET ORAL
Qty: 180 | Refills: 3 | Status: ACTIVE | COMMUNITY
Start: 2021-07-06 | End: 1900-01-01

## 2021-07-06 RX ORDER — GLYCERIN .002; .002; .01 MG/MG; MG/MG; MG/MG
0.2-0.2-1 SOLUTION/ DROPS OPHTHALMIC
Qty: 45 | Refills: 0 | Status: ACTIVE | COMMUNITY
Start: 2021-06-04

## 2021-07-06 RX ORDER — CLOTRIMAZOLE 10 MG/ML
1 SOLUTION TOPICAL
Qty: 30 | Refills: 0 | Status: ACTIVE | COMMUNITY
Start: 2021-05-10

## 2021-07-06 RX ORDER — METOPROLOL TARTRATE 50 MG/1
50 TABLET, FILM COATED ORAL
Qty: 180 | Refills: 0 | Status: ACTIVE | COMMUNITY
Start: 2021-03-12

## 2021-07-06 RX ORDER — SODIUM ZIRCONIUM CYCLOSILICATE 10 G/10G
10 POWDER, FOR SUSPENSION ORAL
Qty: 30 | Refills: 0 | Status: ACTIVE | COMMUNITY
Start: 2021-07-01

## 2021-07-06 RX ORDER — POLYVINYL ALCOHOL 14 MG/ML
1.4 SOLUTION/ DROPS OPHTHALMIC
Qty: 15 | Refills: 0 | Status: ACTIVE | COMMUNITY
Start: 2021-03-12

## 2021-07-06 NOTE — PHYSICAL EXAM
[Normal Breath Sounds] : Normal breath sounds [2+] : left 2+ [1+] : left 1+ [Ankle Swelling (On Exam)] : present [Ankle Swelling Bilaterally] : bilaterally  [Varicose Veins Of Lower Extremities] : bilaterally [] : bilaterally [Ankle Swelling On The Right] : mild [No HSM] : no hepatosplenomegaly [No Rash or Lesion] : No rash or lesion [Alert] : alert [Oriented to Person] : oriented to person [Oriented to Place] : oriented to place [Oriented to Time] : oriented to time [Calm] : calm [JVD] : no jugular venous distention  [Right Carotid Bruit] : no bruit heard over the right carotid [Left Carotid Bruit] : no bruit heard over the left carotid [Abdomen Masses] : No abdominal masses [Tender] : was nontender [Stool Sample Taken] : No stool obtained  on rectal exam [de-identified] : nad [de-identified] : wnl [FreeTextEntry1] : Mild arterial insufficiency w mild  trophic skin changes \par Mild bilateral leg venous insufficiency \par w moderate bilateral leg stasis dermatitis, hyperpigmentation in the gator area, lipodermatosclerosis, hyperkeratosis (skin thickening)\par and mild to moderate bilateral leg edema \par Multiple  bilateral leg small varicose  veins and spider veins  ant post medial calf and shin \par RLE Varicose veins measuring 1-2  mm in size on the calf /shin \par LLE Varicose veins measuring 1-2  mm in size on the calf /shin \par no wounds/ulcers\par  [de-identified] : wnl [de-identified] : wnl [de-identified] : Mikey Cranial nerves 2-12 mikey grossly intact [de-identified] : cooperative

## 2021-07-06 NOTE — HISTORY OF PRESENT ILLNESS
[FreeTextEntry1] : pt is on pletal 50 bid\par pt states elisha le and foot nocturnal  cramps have sig improved \par pt is compliant w comp stockings\par pt states no le swelling or discomfort  c/o at this time  [de-identified] : pt is on pletal 50 bid\par pt states elisha le and foot nocturnal  cramps have sig improved  and now have returned \par pt is compliant w comp stockings\par pt states minimal le swelling or discomfort since last ov

## 2021-07-06 NOTE — DATA REVIEWED
[FreeTextEntry1] : 12/26/2017 venous doppler Mikey le no acute dvt svt RLE GSV  ablated  LLE no sono evidence of reflux \par \par 1/31/2018 ROBBIE/PVR mild infragenic art insuff w vessel calcification rt robbie NC lt robbie NC \par \par 3/13/2019 ROBBIE/PVR mild infragenic art insuff w vessel calcification rt robbie NC lt robbie 1.17\par \par 6/22/2020 ROBBIE/PVR mild infragenic art insuff w vessel calcification RT robbie NC LT robbie 1.22\par \par 7/6/2021 ROBBIE/PVR mild infragenic art insuff w vessel calcification RT robbie NC LT robbie 1.22\par

## 2021-07-06 NOTE — ASSESSMENT
[Arterial/Venous Disease] : arterial/venous disease [Medication Management] : medication management [Other: _____] : [unfilled] [Foot care/Footwear] : foot care/footwear [FreeTextEntry1] : Impression  symptomatic arterial  insuff w csx , stable venous insff sx\par \par med Conserv management leg elevation, knee high comp stockings  15-20mm Hg, wt loss, diet control, exercise program, protective measures \par d/w pt increasing pletal to 100 bid \par pt at this time wants to hold off \par continue pletal 50 bid \par ov w lamin/pvr s/o art insuff  12 mo  july /2022\par telehealth visit in  dec 2021 \par \par \par Varicose veins are enlarged, twisted veins. Varicose veins are caused by increased blood pressure in the veins.  The blood moves towards the heart by 1-way valves in the veins. When the valves become weakened or damaged, blood can collect in the veins and pool in your lower legs (ankles). This causes the veins to become enlarged and incompetent with reflux. Sitting or standing for long periods can cause blood to pool in the leg veins, increasing the pressure within the veins. \par Risk factors for varicose veins or venous disease may include:  obesity, older age, standing or sitting for prolonged periods of time for several years, being female, pregnancy, taking oral contraceptive pills or hormone replacement, being inactive, and/or smoking. \par The most common symptoms of varicose veins are sensations in the legs, such as a heavy feeling, burning, and/or aching. However, each individual may experience symptoms differently.  Other symptoms may include:  color changes in the skin, sores on the legs, or rash.  Severe varicose veins or venous disease may eventually produce long-term mild swelling that can result in more serious skin and tissue problems, such as ulcers and non-healing sores.\par Varicose veins and venous disease are diagnosed by a complete medical history, physical examination, and diagnostic studies for varicose veins including duplex ultrasound and color-flow imaging.  \par Medical treatment for varicose veins and venous disease include:  compression stockings, sclerotherapy, endovenous ablation and/or surgical treatment with microphlebectomy.  \par \par

## 2021-08-25 NOTE — HISTORY OF PRESENT ILLNESS
180 [FreeTextEntry1] : pt states elisha le noct leg and foot  cramps have  sig improved and nearly resolved  in intensity  since last ov\par toes color has also improved \par pt is compliant w comp stockings\par pt is on pletal  50 mg bid  [de-identified] : pt has completed po cipro  rx \par pt states leg cramps have sig improved and overall is feeling better since last ov \par pt is on pletal 50 bid

## 2021-12-01 PROCEDURE — G9005: CPT

## 2021-12-08 ENCOUNTER — APPOINTMENT (OUTPATIENT)
Dept: VASCULAR SURGERY | Facility: CLINIC | Age: 76
End: 2021-12-08
Payer: MEDICARE

## 2021-12-08 PROCEDURE — 99212 OFFICE O/P EST SF 10 MIN: CPT

## 2021-12-08 RX ORDER — METOPROLOL SUCCINATE 200 MG/1
TABLET, EXTENDED RELEASE ORAL
Refills: 0 | Status: ACTIVE | COMMUNITY

## 2021-12-08 RX ORDER — CILOSTAZOL 50 MG/1
50 TABLET ORAL
Qty: 180 | Refills: 3 | Status: ACTIVE | COMMUNITY
Start: 2021-12-08 | End: 1900-01-01

## 2021-12-08 NOTE — REASON FOR VISIT
[Home] : at home, [unfilled] , at the time of the visit. [Medical Office: ([facility])___] : at the medical office located in  [Other:____] : [unfilled] [Verbal consent obtained from patient] : the patient, [unfilled] [FreeTextEntry1] : My legs bother me less

## 2021-12-08 NOTE — ASSESSMENT
[Arterial/Venous Disease] : arterial/venous disease [Medication Management] : medication management [Other: _____] : [unfilled] [Foot care/Footwear] : foot care/footwear [FreeTextEntry1] : Impression  symptomatic arterial  insuff w clinical improvement  , stable venous insff sx\par \par Med Conserv management leg elevation, knee high comp stockings  15-20mm Hg, wt loss, diet control, exercise program, protective measures \par d/w pt increasing pletal to 100 bid \par pt at this time wants to hold off \par continue pletal 50 bid \par ov w lamin/pvr s/o art insuff  12 mo  july /2022\par Telephonic visit  time duration 12 min\par \par \par \par Varicose veins are enlarged, twisted veins. Varicose veins are caused by increased blood pressure in the veins.  The blood moves towards the heart by 1-way valves in the veins. When the valves become weakened or damaged, blood can collect in the veins and pool in your lower legs (ankles). This causes the veins to become enlarged and incompetent with reflux. Sitting or standing for long periods can cause blood to pool in the leg veins, increasing the pressure within the veins. \par Risk factors for varicose veins or venous disease may include:  obesity, older age, standing or sitting for prolonged periods of time for several years, being female, pregnancy, taking oral contraceptive pills or hormone replacement, being inactive, and/or smoking. \par The most common symptoms of varicose veins are sensations in the legs, such as a heavy feeling, burning, and/or aching. However, each individual may experience symptoms differently.  Other symptoms may include:  color changes in the skin, sores on the legs, or rash.  Severe varicose veins or venous disease may eventually produce long-term mild swelling that can result in more serious skin and tissue problems, such as ulcers and non-healing sores.\par Varicose veins and venous disease are diagnosed by a complete medical history, physical examination, and diagnostic studies for varicose veins including duplex ultrasound and color-flow imaging.  \par Medical treatment for varicose veins and venous disease include:  compression stockings, sclerotherapy, endovenous ablation and/or surgical treatment with microphlebectomy.  \par \par

## 2021-12-08 NOTE — DATA REVIEWED
[FreeTextEntry1] : 12/26/2017 venous doppler Mikey le no acute dvt svt RLE GSV  ablated  LLE no sono evidence of reflux \par \par 1/31/2018 ROBBIE/PVR mild infragenic art insuff w vessel calcification rt robbie NC lt robbie NC \par \par 3/13/2019 ROBBIE/PVR mild infragenic art insuff w vessel calcification rt robbie NC lt robbie 1.17\par \par 6/22/2020 ROBBIE/PVR mild infragenic art insuff w vessel calcification RT robbie NC LT robbie 1.22\par \par 7/6/2021 ROBBIE/PVR mild infragenic art insuff w vessel calcification RT robbie NC LT robbie NC\par

## 2021-12-08 NOTE — PHYSICAL EXAM
[Normal Breath Sounds] : Normal breath sounds [No Rash or Lesion] : No rash or lesion [Alert] : alert [Oriented to Person] : oriented to person [Oriented to Place] : oriented to place [Oriented to Time] : oriented to time [Calm] : calm [FreeTextEntry1] : Physical exam findings via telephonic review with patient \par \par  [de-identified] : cooperative

## 2022-01-18 NOTE — ED PROVIDER NOTE - MUSCULOSKELETAL NEGATIVE STATEMENT, MLM
Last visit: 12/22/21   Referred by: Dr. Lacy Rg    Delvin De is a 9 year old male who presents to the Dermatology clinic for follow up of   1. wart(s).   Location: B knee and elbow  LV: look smaller  -- Injection #5, Candida 0.3 x2, Location: elbow  Today parent states:        2. Eczema, localized, not controlled - LV:  NO CONCERNS  Plan:   Start fragrance free emollient bid  Use 1% HC bid for active eczema.   Today mom states:     04/13/2017 small CMN scalp    PHYSICAL EXAMINATION:   A general skin exam including scalp, face, eyelids, lips, neck, chest, abdomen, back, arms, legs, digits and nails was performed today.   - verrucous papules one on each knee, right elbow with hemorrhagic crusted papule    ASSESSMENT AND PLAN:  1. Verrucae.   Need for multiple injections reviewed.   Number warts injected:  Location: B knee  Injection #:5  Candida 0.3 x2  Location: elbow    2. Eczema, - well controlled per parent  Plan unchanged.    RTC: 1 month    Collaborating physician:  Dr Remi Segura  Electronically Signed by:  Dian Acosta CNP, 1/18/2022      
no back pain, no gout, no musculoskeletal pain, no neck pain, and no weakness.

## 2022-07-12 ENCOUNTER — APPOINTMENT (OUTPATIENT)
Dept: VASCULAR SURGERY | Facility: CLINIC | Age: 77
End: 2022-07-12

## 2022-07-12 VITALS
WEIGHT: 198 LBS | BODY MASS INDEX: 35.08 KG/M2 | DIASTOLIC BLOOD PRESSURE: 76 MMHG | HEART RATE: 81 BPM | SYSTOLIC BLOOD PRESSURE: 154 MMHG | TEMPERATURE: 95.3 F | HEIGHT: 63 IN

## 2022-07-12 DIAGNOSIS — M79.89 OTHER SPECIFIED SOFT TISSUE DISORDERS: ICD-10-CM

## 2022-07-12 DIAGNOSIS — L98.499 STRICTURE OF ARTERY: ICD-10-CM

## 2022-07-12 DIAGNOSIS — M79.604 PAIN IN RIGHT LEG: ICD-10-CM

## 2022-07-12 DIAGNOSIS — I77.1 STRICTURE OF ARTERY: ICD-10-CM

## 2022-07-12 DIAGNOSIS — M79.605 PAIN IN RIGHT LEG: ICD-10-CM

## 2022-07-12 DIAGNOSIS — I83.899 VARICOSE VEINS OF UNSPECIFIED LOWER EXTREMITY WITH OTHER COMPLICATIONS: ICD-10-CM

## 2022-07-12 PROCEDURE — 99214 OFFICE O/P EST MOD 30 MIN: CPT

## 2022-07-12 PROCEDURE — 93970 EXTREMITY STUDY: CPT

## 2022-07-12 PROCEDURE — 93923 UPR/LXTR ART STDY 3+ LVLS: CPT

## 2022-07-12 RX ORDER — BUDESONIDE AND FORMOTEROL FUMARATE DIHYDRATE 160; 4.5 UG/1; UG/1
160-4.5 AEROSOL RESPIRATORY (INHALATION)
Qty: 20 | Refills: 0 | Status: ACTIVE | COMMUNITY
Start: 2022-02-10

## 2022-07-12 RX ORDER — GLIMEPIRIDE 2 MG/1
2 TABLET ORAL
Qty: 90 | Refills: 0 | Status: ACTIVE | COMMUNITY
Start: 2022-02-10

## 2022-07-12 RX ORDER — CILOSTAZOL 50 MG/1
50 TABLET ORAL
Qty: 60 | Refills: 6 | Status: ACTIVE | COMMUNITY
Start: 2022-07-12 | End: 1900-01-01

## 2022-07-12 RX ORDER — MONTELUKAST 10 MG/1
10 TABLET, FILM COATED ORAL
Qty: 90 | Refills: 0 | Status: ACTIVE | COMMUNITY
Start: 2022-02-10

## 2022-07-12 RX ORDER — LEVOFLOXACIN 500 MG/1
500 TABLET, FILM COATED ORAL
Qty: 10 | Refills: 0 | Status: ACTIVE | COMMUNITY
Start: 2022-02-10

## 2022-07-12 RX ORDER — GUAIFENESIN AND DEXTROMETHORPHAN HYDROBROMIDE 100; 10 MG/5ML; MG/5ML
100-10 LIQUID ORAL
Qty: 354 | Refills: 0 | Status: ACTIVE | COMMUNITY
Start: 2022-02-10

## 2022-07-12 NOTE — HISTORY OF PRESENT ILLNESS
[de-identified] : pt is on pletal 50 bid\par pt states elisha le and foot nocturnal  cramps have sig improved  and now able to sleep\par pt is compliant w comp stockings\par pt states minimal le swelling or discomfort since last ov \par pt states no le wounds \par \par \par 3 weeks ago  piece of wood fell onto left foot \par pt  was placed on po abx 2 weeks \par pt states left foot is feeling worse and swelling \par pt is partially  compliant w comp stockings \par \par  [FreeTextEntry1] : pt is on pletal 50 bid\par pt states elisha le and foot nocturnal  cramps have sig improved \par pt is compliant w comp stockings\par pt states no le swelling or discomfort  c/o at this time

## 2022-07-12 NOTE — ASSESSMENT
[Arterial/Venous Disease] : arterial/venous disease [Medication Management] : medication management [Other: _____] : [unfilled] [Foot care/Footwear] : foot care/footwear [FreeTextEntry1] : Impression  symptomatic arterial  insuff w clinical improvement  ,  venous insff  wle swelling \par \par Med Conserv management leg elevation,resume  knee high comp stockings  15-20mm Hg, wt loss, diet control, exercise program, protective measures \par d/w pt increasing pletal to 100 bid \par pt at this time wants to hold off \par continue pletal 50 bid \par ov w lamin/pvr s/o art insuff  12 mo  july /2023\par telephonic visit in  6 mo dec 2022 \par \par \par \par Varicose veins are enlarged, twisted veins. Varicose veins are caused by increased blood pressure in the veins.  The blood moves towards the heart by 1-way valves in the veins. When the valves become weakened or damaged, blood can collect in the veins and pool in your lower legs (ankles). This causes the veins to become enlarged and incompetent with reflux. Sitting or standing for long periods can cause blood to pool in the leg veins, increasing the pressure within the veins. \par Risk factors for varicose veins or venous disease may include:  obesity, older age, standing or sitting for prolonged periods of time for several years, being female, pregnancy, taking oral contraceptive pills or hormone replacement, being inactive, and/or smoking. \par The most common symptoms of varicose veins are sensations in the legs, such as a heavy feeling, burning, and/or aching. However, each individual may experience symptoms differently.  Other symptoms may include:  color changes in the skin, sores on the legs, or rash.  Severe varicose veins or venous disease may eventually produce long-term mild swelling that can result in more serious skin and tissue problems, such as ulcers and non-healing sores.\par Varicose veins and venous disease are diagnosed by a complete medical history, physical examination, and diagnostic studies for varicose veins including duplex ultrasound and color-flow imaging.  \par Medical treatment for varicose veins and venous disease include:  compression stockings, sclerotherapy, endovenous ablation and/or surgical treatment with microphlebectomy.  \par \par

## 2022-07-12 NOTE — PHYSICAL EXAM
[Normal Breath Sounds] : Normal breath sounds [1+] : left 1+ [Ankle Swelling (On Exam)] : present [Ankle Swelling Bilaterally] : bilaterally  [Varicose Veins Of Lower Extremities] : bilaterally [] : bilaterally [Ankle Swelling On The Right] : mild [No HSM] : no hepatosplenomegaly [No Rash or Lesion] : No rash or lesion [Oriented to Person] : oriented to person [Alert] : alert [Oriented to Place] : oriented to place [Oriented to Time] : oriented to time [Calm] : calm [JVD] : no jugular venous distention  [Ankle Swelling On The Left] : moderate [Abdomen Masses] : No abdominal masses [Tender] : was nontender [de-identified] : nad [de-identified] : wnl [FreeTextEntry1] : Mild arterial insufficiency w mild  trophic skin changes \par Mild bilateral leg venous insufficiency \par w moderate bilateral leg stasis dermatitis, hyperpigmentation in the gator area, lipodermatosclerosis, hyperkeratosis (skin thickening)\par and  moderate bilateral leg edema \par Multiple  bilateral leg small varicose  veins and spider veins  ant post medial calf and shin \par RLE Varicose veins measuring 1-2  mm in size on the calf /shin \par LLE Varicose veins measuring 1-2  mm in size on the calf /shin \par no wounds/ulcers\par  [de-identified] : wnl [de-identified] : Mikey Cranial nerves 2-12 mikey grossly intact [de-identified] : cooperative

## 2022-07-12 NOTE — REASON FOR VISIT
[Home] : at home, [unfilled] , at the time of the visit. [Medical Office: ([facility])___] : at the medical office located in  [Other:____] : [unfilled] [Verbal consent obtained from patient] : the patient, [unfilled] [FreeTextEntry1] : My legs bother me again  and are swollen

## 2022-07-12 NOTE — DATA REVIEWED
[FreeTextEntry1] : 12/26/2017 venous doppler Mikey le no acute dvt svt RLE GSV  ablated  LLE no sono evidence of reflux \par \par 1/31/2018 ROBBIE/PVR mild infragenic art insuff w vessel calcification rt robbie NC lt robbie NC \par \par 3/13/2019 ROBBIE/PVR mild infragenic art insuff w vessel calcification rt robbie NC lt robbie 1.17\par \par 6/22/2020 ROBBIE/PVR mild infragenic art insuff w vessel calcification RT robbie NC LT robbie 1.22\par \par 7/6/2021 ROBBIE/PVR mild infragenic art insuff w vessel calcification RT robbie NC LT robbie NC\par \par 7/12/2022 ROBBIE/PVR mild infragenic art insuff w vessel calcification RT robbie .61 LT robbie .56\par \par 7/12/2022 Venous Doppler Mikye LE  no acute dvt svt \par

## 2022-07-23 NOTE — ED ADULT NURSE NOTE - BREATHING, MLM
[Care Plan reviewed and provided to patient/caregiver] : Care plan reviewed and provided to patient/caregiver
[Understands and communicates without difficulty] : Patient/Caregiver understands and communicates without difficulty
Pre-application: Motor, sensory, and vascular responses intact in the injured extremity./Post-application: Motor, sensory, and vascular responses intact in the injured extremity.
Spontaneous, unlabored and symmetrical

## 2022-12-14 ENCOUNTER — APPOINTMENT (OUTPATIENT)
Dept: VASCULAR SURGERY | Facility: CLINIC | Age: 77
End: 2022-12-14

## 2022-12-14 DIAGNOSIS — M54.50 LOW BACK PAIN, UNSPECIFIED: ICD-10-CM

## 2022-12-14 DIAGNOSIS — M79.18 MYALGIA, OTHER SITE: ICD-10-CM

## 2022-12-14 PROCEDURE — 99442: CPT

## 2022-12-14 NOTE — DATA REVIEWED
[FreeTextEntry1] : 12/26/2017 venous doppler Mikey le no acute dvt svt RLE GSV  ablated  LLE no sono evidence of reflux \par \par 1/31/2018 ROBBIE/PVR mild infragenic art insuff w vessel calcification rt robbie NC lt robbie NC \par \par 3/13/2019 ROBBIE/PVR mild infragenic art insuff w vessel calcification rt robbie NC lt robbie 1.17\par \par 6/22/2020 ROBBIE/PVR mild infragenic art insuff w vessel calcification RT robbie NC LT robbie 1.22\par \par 7/6/2021 ROBBIE/PVR mild infragenic art insuff w vessel calcification RT robbie NC LT robbie NC\par \par 7/12/2022 ROBBIE/PVR mild infragenic art insuff w vessel calcification RT robbie .61 LT robbie .56\par \par 7/12/2022 Venous Doppler Mikey LE  no acute dvt svt \par

## 2022-12-14 NOTE — HISTORY OF PRESENT ILLNESS
[FreeTextEntry1] : pt is on pletal 50 bid\par pt states elisha le and foot nocturnal  cramps have sig improved \par pt is compliant w comp stockings\par pt states no le swelling or discomfort  c/o at this time  [de-identified] : pt is on pletal 50 bid\par pt states elisha le and foot nocturnal  cramps have sig improved   but some remain \par pt is compliant w comp stockings\par pt states no le wounds \par pt states ongoing lower back pain w radiation to  buttocks and unable to sit and \par worse pain mera when getting up or sitting down \par pt denies any recent inj or falls \par pt is receiving pt/ot and was recommended   lower back injections

## 2022-12-14 NOTE — REASON FOR VISIT
[Home] : at home, [unfilled] , at the time of the visit. [Medical Office: ([facility])___] : at the medical office located in  [Other:____] : [unfilled] [Verbal consent obtained from patient] : the patient, [unfilled] [FreeTextEntry1] : My  lower back and buttocks bother me

## 2022-12-14 NOTE — PHYSICAL EXAM
[No Rash or Lesion] : No rash or lesion [Alert] : alert [Oriented to Person] : oriented to person [Oriented to Place] : oriented to place [Oriented to Time] : oriented to time [Calm] : calm [de-identified] : no resp distress [FreeTextEntry1] : Physical exam findings via telephonic review with patient \par \par  [de-identified] : cooperative

## 2022-12-14 NOTE — ASSESSMENT
[Arterial/Venous Disease] : arterial/venous disease [Medication Management] : medication management [Other: _____] : [unfilled] [Foot care/Footwear] : foot care/footwear [FreeTextEntry1] : Impression  arterial  insuff and venous insff  clinically stable \par current c/o  s/o lss neurogenic or orthopedic source \par \par Med Conserv management leg elevation,resume  knee high comp stockings  15-20mm Hg, wt loss, diet control, exercise program, protective measures \par d/w pt increasing pletal to 100 bid \par pt at this time wants to hold off \par continue pletal 50 bid \par advised pt to d/w Dr Kelly  to f/u w  neuro surg or ortho spine for eval/referral  \par le  and  lower back and buttock c/o s/o neurogenic source\par pt agrees to do so \par ov w lamin/pvr s/o art insuff  12 mo  july /2023\par telephonic visit in jan 2023 to re eval \par Telephonic visit  time duration 13 min\par \par \par \par letter faxed to Dr YESSY Kelly MD\par \par \par \par Varicose veins are enlarged, twisted veins. Varicose veins are caused by increased blood pressure in the veins.  The blood moves towards the heart by 1-way valves in the veins. When the valves become weakened or damaged, blood can collect in the veins and pool in your lower legs (ankles). This causes the veins to become enlarged and incompetent with reflux. Sitting or standing for long periods can cause blood to pool in the leg veins, increasing the pressure within the veins. \par Risk factors for varicose veins or venous disease may include:  obesity, older age, standing or sitting for prolonged periods of time for several years, being female, pregnancy, taking oral contraceptive pills or hormone replacement, being inactive, and/or smoking. \par The most common symptoms of varicose veins are sensations in the legs, such as a heavy feeling, burning, and/or aching. However, each individual may experience symptoms differently.  Other symptoms may include:  color changes in the skin, sores on the legs, or rash.  Severe varicose veins or venous disease may eventually produce long-term mild swelling that can result in more serious skin and tissue problems, such as ulcers and non-healing sores.\par Varicose veins and venous disease are diagnosed by a complete medical history, physical examination, and diagnostic studies for varicose veins including duplex ultrasound and color-flow imaging.  \par Medical treatment for varicose veins and venous disease include:  compression stockings, sclerotherapy, endovenous ablation and/or surgical treatment with microphlebectomy.  \par \par

## 2023-03-12 ENCOUNTER — EMERGENCY (EMERGENCY)
Facility: HOSPITAL | Age: 78
LOS: 1 days | Discharge: HOME | End: 2023-03-12
Attending: EMERGENCY MEDICINE | Admitting: EMERGENCY MEDICINE
Payer: MEDICARE

## 2023-03-12 VITALS
SYSTOLIC BLOOD PRESSURE: 191 MMHG | RESPIRATION RATE: 15 BRPM | HEART RATE: 80 BPM | TEMPERATURE: 98 F | DIASTOLIC BLOOD PRESSURE: 79 MMHG | OXYGEN SATURATION: 100 %

## 2023-03-12 DIAGNOSIS — Z90.49 ACQUIRED ABSENCE OF OTHER SPECIFIED PARTS OF DIGESTIVE TRACT: Chronic | ICD-10-CM

## 2023-03-12 LAB
ALBUMIN SERPL ELPH-MCNC: 4 G/DL — SIGNIFICANT CHANGE UP (ref 3.3–5)
ALP SERPL-CCNC: 56 U/L — SIGNIFICANT CHANGE UP (ref 40–120)
ALT FLD-CCNC: 12 U/L — SIGNIFICANT CHANGE UP (ref 4–33)
ANION GAP SERPL CALC-SCNC: 12 MMOL/L — SIGNIFICANT CHANGE UP (ref 7–14)
ANION GAP SERPL CALC-SCNC: 12 MMOL/L — SIGNIFICANT CHANGE UP (ref 7–14)
APPEARANCE UR: CLEAR — SIGNIFICANT CHANGE UP
AST SERPL-CCNC: 20 U/L — SIGNIFICANT CHANGE UP (ref 4–32)
BACTERIA # UR AUTO: ABNORMAL
BASE EXCESS BLDV CALC-SCNC: 0.9 MMOL/L — SIGNIFICANT CHANGE UP (ref -2–3)
BASOPHILS # BLD AUTO: 0.07 K/UL — SIGNIFICANT CHANGE UP (ref 0–0.2)
BASOPHILS NFR BLD AUTO: 0.5 % — SIGNIFICANT CHANGE UP (ref 0–2)
BILIRUB SERPL-MCNC: <0.2 MG/DL — SIGNIFICANT CHANGE UP (ref 0.2–1.2)
BILIRUB UR-MCNC: NEGATIVE — SIGNIFICANT CHANGE UP
BLOOD GAS VENOUS COMPREHENSIVE RESULT: SIGNIFICANT CHANGE UP
BLOOD GAS VENOUS COMPREHENSIVE RESULT: SIGNIFICANT CHANGE UP
BUN SERPL-MCNC: 11 MG/DL — SIGNIFICANT CHANGE UP (ref 7–23)
BUN SERPL-MCNC: 12 MG/DL — SIGNIFICANT CHANGE UP (ref 7–23)
CALCIUM SERPL-MCNC: 8.4 MG/DL — SIGNIFICANT CHANGE UP (ref 8.4–10.5)
CALCIUM SERPL-MCNC: 9.1 MG/DL — SIGNIFICANT CHANGE UP (ref 8.4–10.5)
CHLORIDE BLDV-SCNC: 92 MMOL/L — LOW (ref 96–108)
CHLORIDE SERPL-SCNC: 90 MMOL/L — LOW (ref 98–107)
CHLORIDE SERPL-SCNC: 97 MMOL/L — LOW (ref 98–107)
CO2 BLDV-SCNC: 28.6 MMOL/L — HIGH (ref 22–26)
CO2 SERPL-SCNC: 23 MMOL/L — SIGNIFICANT CHANGE UP (ref 22–31)
CO2 SERPL-SCNC: 25 MMOL/L — SIGNIFICANT CHANGE UP (ref 22–31)
COLOR SPEC: SIGNIFICANT CHANGE UP
CREAT SERPL-MCNC: 0.81 MG/DL — SIGNIFICANT CHANGE UP (ref 0.5–1.3)
CREAT SERPL-MCNC: 0.84 MG/DL — SIGNIFICANT CHANGE UP (ref 0.5–1.3)
DIFF PNL FLD: NEGATIVE — SIGNIFICANT CHANGE UP
EGFR: 71 ML/MIN/1.73M2 — SIGNIFICANT CHANGE UP
EGFR: 74 ML/MIN/1.73M2 — SIGNIFICANT CHANGE UP
EOSINOPHIL # BLD AUTO: 0.17 K/UL — SIGNIFICANT CHANGE UP (ref 0–0.5)
EOSINOPHIL NFR BLD AUTO: 1.3 % — SIGNIFICANT CHANGE UP (ref 0–6)
EPI CELLS # UR: 2 /HPF — SIGNIFICANT CHANGE UP (ref 0–5)
FLUAV AG NPH QL: SIGNIFICANT CHANGE UP
FLUBV AG NPH QL: SIGNIFICANT CHANGE UP
GAS PNL BLDV: 123 MMOL/L — LOW (ref 136–145)
GLUCOSE BLDV-MCNC: 145 MG/DL — HIGH (ref 70–99)
GLUCOSE SERPL-MCNC: 143 MG/DL — HIGH (ref 70–99)
GLUCOSE SERPL-MCNC: 83 MG/DL — SIGNIFICANT CHANGE UP (ref 70–99)
GLUCOSE UR QL: NEGATIVE — SIGNIFICANT CHANGE UP
HCO3 BLDV-SCNC: 27 MMOL/L — SIGNIFICANT CHANGE UP (ref 22–29)
HCT VFR BLD CALC: 35.3 % — SIGNIFICANT CHANGE UP (ref 34.5–45)
HCT VFR BLDA CALC: 35 % — SIGNIFICANT CHANGE UP (ref 34.5–46.5)
HGB BLD CALC-MCNC: 11.8 G/DL — SIGNIFICANT CHANGE UP (ref 11.7–16.1)
HGB BLD-MCNC: 11 G/DL — LOW (ref 11.5–15.5)
HYALINE CASTS # UR AUTO: 0 /LPF — SIGNIFICANT CHANGE UP (ref 0–7)
IANC: 8.45 K/UL — HIGH (ref 1.8–7.4)
IMM GRANULOCYTES NFR BLD AUTO: 0.3 % — SIGNIFICANT CHANGE UP (ref 0–0.9)
KETONES UR-MCNC: NEGATIVE — SIGNIFICANT CHANGE UP
LACTATE BLDV-MCNC: 2.9 MMOL/L — HIGH (ref 0.5–2)
LEUKOCYTE ESTERASE UR-ACNC: ABNORMAL
LIDOCAIN IGE QN: 64 U/L — HIGH (ref 7–60)
LYMPHOCYTES # BLD AUTO: 27.6 % — SIGNIFICANT CHANGE UP (ref 13–44)
LYMPHOCYTES # BLD AUTO: 3.72 K/UL — HIGH (ref 1–3.3)
MCHC RBC-ENTMCNC: 27.1 PG — SIGNIFICANT CHANGE UP (ref 27–34)
MCHC RBC-ENTMCNC: 31.2 GM/DL — LOW (ref 32–36)
MCV RBC AUTO: 86.9 FL — SIGNIFICANT CHANGE UP (ref 80–100)
MONOCYTES # BLD AUTO: 1.03 K/UL — HIGH (ref 0–0.9)
MONOCYTES NFR BLD AUTO: 7.6 % — SIGNIFICANT CHANGE UP (ref 2–14)
NEUTROPHILS # BLD AUTO: 8.45 K/UL — HIGH (ref 1.8–7.4)
NEUTROPHILS NFR BLD AUTO: 62.7 % — SIGNIFICANT CHANGE UP (ref 43–77)
NITRITE UR-MCNC: NEGATIVE — SIGNIFICANT CHANGE UP
NRBC # BLD: 0 /100 WBCS — SIGNIFICANT CHANGE UP (ref 0–0)
NRBC # FLD: 0 K/UL — SIGNIFICANT CHANGE UP (ref 0–0)
PCO2 BLDV: 49 MMHG — SIGNIFICANT CHANGE UP (ref 39–52)
PH BLDV: 7.35 — SIGNIFICANT CHANGE UP (ref 7.32–7.43)
PH UR: 7 — SIGNIFICANT CHANGE UP (ref 5–8)
PLATELET # BLD AUTO: 409 K/UL — HIGH (ref 150–400)
PO2 BLDV: 30 MMHG — SIGNIFICANT CHANGE UP (ref 25–45)
POTASSIUM BLDV-SCNC: 7 MMOL/L — CRITICAL HIGH (ref 3.5–5.1)
POTASSIUM SERPL-MCNC: 4.2 MMOL/L — SIGNIFICANT CHANGE UP (ref 3.5–5.3)
POTASSIUM SERPL-MCNC: 4.9 MMOL/L — SIGNIFICANT CHANGE UP (ref 3.5–5.3)
POTASSIUM SERPL-SCNC: 4.2 MMOL/L — SIGNIFICANT CHANGE UP (ref 3.5–5.3)
POTASSIUM SERPL-SCNC: 4.9 MMOL/L — SIGNIFICANT CHANGE UP (ref 3.5–5.3)
PROT SERPL-MCNC: 7 G/DL — SIGNIFICANT CHANGE UP (ref 6–8.3)
PROT UR-MCNC: ABNORMAL
RBC # BLD: 4.06 M/UL — SIGNIFICANT CHANGE UP (ref 3.8–5.2)
RBC # FLD: 12.6 % — SIGNIFICANT CHANGE UP (ref 10.3–14.5)
RBC CASTS # UR COMP ASSIST: 2 /HPF — SIGNIFICANT CHANGE UP (ref 0–4)
RSV RNA NPH QL NAA+NON-PROBE: SIGNIFICANT CHANGE UP
SAO2 % BLDV: 54.7 % — LOW (ref 67–88)
SARS-COV-2 RNA SPEC QL NAA+PROBE: SIGNIFICANT CHANGE UP
SODIUM SERPL-SCNC: 127 MMOL/L — LOW (ref 135–145)
SODIUM SERPL-SCNC: 132 MMOL/L — LOW (ref 135–145)
SP GR SPEC: 1.01 — SIGNIFICANT CHANGE UP (ref 1.01–1.05)
UROBILINOGEN FLD QL: SIGNIFICANT CHANGE UP
WBC # BLD: 13.48 K/UL — HIGH (ref 3.8–10.5)
WBC # FLD AUTO: 13.48 K/UL — HIGH (ref 3.8–10.5)
WBC UR QL: 49 /HPF — HIGH (ref 0–5)

## 2023-03-12 PROCEDURE — 99285 EMERGENCY DEPT VISIT HI MDM: CPT

## 2023-03-12 RX ORDER — CEFTRIAXONE 500 MG/1
1000 INJECTION, POWDER, FOR SOLUTION INTRAMUSCULAR; INTRAVENOUS ONCE
Refills: 0 | Status: COMPLETED | OUTPATIENT
Start: 2023-03-12 | End: 2023-03-12

## 2023-03-12 RX ORDER — ONDANSETRON 8 MG/1
4 TABLET, FILM COATED ORAL ONCE
Refills: 0 | Status: COMPLETED | OUTPATIENT
Start: 2023-03-12 | End: 2023-03-12

## 2023-03-12 RX ORDER — CEFPODOXIME PROXETIL 100 MG
1 TABLET ORAL
Qty: 28 | Refills: 0
Start: 2023-03-12 | End: 2023-03-25

## 2023-03-12 RX ORDER — SODIUM CHLORIDE 9 MG/ML
1000 INJECTION INTRAMUSCULAR; INTRAVENOUS; SUBCUTANEOUS ONCE
Refills: 0 | Status: COMPLETED | OUTPATIENT
Start: 2023-03-12 | End: 2023-03-12

## 2023-03-12 RX ORDER — SODIUM CHLORIDE 9 MG/ML
250 INJECTION INTRAMUSCULAR; INTRAVENOUS; SUBCUTANEOUS ONCE
Refills: 0 | Status: COMPLETED | OUTPATIENT
Start: 2023-03-12 | End: 2023-03-12

## 2023-03-12 RX ORDER — KETOROLAC TROMETHAMINE 30 MG/ML
15 SYRINGE (ML) INJECTION ONCE
Refills: 0 | Status: DISCONTINUED | OUTPATIENT
Start: 2023-03-12 | End: 2023-03-12

## 2023-03-12 RX ADMIN — SODIUM CHLORIDE 1000 MILLILITER(S): 9 INJECTION INTRAMUSCULAR; INTRAVENOUS; SUBCUTANEOUS at 21:41

## 2023-03-12 RX ADMIN — Medication 15 MILLIGRAM(S): at 18:55

## 2023-03-12 RX ADMIN — ONDANSETRON 4 MILLIGRAM(S): 8 TABLET, FILM COATED ORAL at 18:35

## 2023-03-12 RX ADMIN — Medication 15 MILLIGRAM(S): at 21:45

## 2023-03-12 RX ADMIN — CEFTRIAXONE 100 MILLIGRAM(S): 500 INJECTION, POWDER, FOR SOLUTION INTRAMUSCULAR; INTRAVENOUS at 19:14

## 2023-03-12 RX ADMIN — SODIUM CHLORIDE 250 MILLILITER(S): 9 INJECTION INTRAMUSCULAR; INTRAVENOUS; SUBCUTANEOUS at 18:55

## 2023-03-12 NOTE — ED ADULT NURSE NOTE - OBJECTIVE STATEMENT
Received pt A&Ox4, accompanied by daughter, c/o bilateral flank pain (more on the left) and burning with urination x 2 weeks. Also endorses nausea. No active vomiting noted. +1 edema noted to BLE. Denies fever or chills. Breathing even and unlabored. No acute distress noted. Updated on plan of care. Will continue to monitor.

## 2023-03-12 NOTE — ED PROVIDER NOTE - PHYSICAL EXAMINATION
General: non-toxic, NAD  HEENT: NCAT, PERRL, oropharyngeal AW patent  Cardiac: RRR, no murmurs, 2+ peripheral pulses  Resp: CTAB, normal rate  Abdomen: soft, non-distended, bowel sounds present, no ttp, no rebound or guarding. no organomegaly. +CVAT bilaterally.  Extremities: bilateral pitting peripheral edema better than her usual, no calf tenderness, or leg size discrepancies  Skin: warm and dry no rashes  Neuro: AAOx4, 5+motor, sensation grossly intact  Psych: mood and affect appropriate

## 2023-03-12 NOTE — ED PROVIDER NOTE - OBJECTIVE STATEMENT
78-year-old female with a history of hypertension hyperlipidemia non-insulin-dependent diabetes presents from urgent care for 2 weeks of dysuria with frequency and associated low back pain for the last 2 weeks.  Decided to go to urgent care today because she had some subjective fever as well as lightheadedness and palpitations.  Denies any history of kidney stones nausea vomiting or diarrhea.  Patient is otherwise at her baseline state of health. no recent trauma falls or neuro symptoms.

## 2023-03-12 NOTE — ED PROVIDER NOTE - NS ED ROS FT
Constitutional: no fevers, +chills  HEENT: no HA, vision changes, rhinorrhea, sore throat  Cardiac: no chest pain, +palpitations  Respiratory: no SOB, cough or hemoptysis  GI: no n/v/d/c, abd pain, bloody or dark stools  : +dysuria, frequency, no hematuria  MSK: no joint pain, neck pain +back pain  Skin: no rashes, jaundice, pruritis  Neuro: no numbness/tingling, weakness, unsteady gait  ROS otherwise negative except per HPI

## 2023-03-12 NOTE — ED ADULT NURSE REASSESSMENT NOTE - NS ED NURSE REASSESS COMMENT FT1
Patient resting comfortably on stretcher. Not in acute distress. VSS.  Respirations are even and non labored. Safety maintained. Fall precautions are in place. call bell is within reach. Reassured the patient. Son at bedside.

## 2023-03-12 NOTE — ED PROVIDER NOTE - ATTENDING CONTRIBUTION TO CARE
I performed a face-to-face evaluation of the patient and performed a history and physical examination. I agree with the history and physical examination. If this was a PA visit, I personally saw the patient with the PA and performed a substantive portion of the visit including all aspects of the medical decision making.    2 wks L flank pain and dysuria, plus dizziness. DDx: renal stone vs. pyelo. Given h/o CAD/DM, consider ACS or dysrhythmia as cause of dizziness. Check CT for renal stone. Check UA for pyelo. Check EKG and trop. to eval for ACS. I performed a face-to-face evaluation of the patient and performed a history and physical examination. I agree with the history and physical examination. If this was a PA visit, I personally saw the patient with the PA and performed a substantive portion of the visit including all aspects of the medical decision making.    2 wks L buttock pain (chronic) and dysuria, plus dizziness. Recent eval by Uro for this L buttock pain that included CT and MRI. No renal stones found. DDx: Likely pyelo. Check UA for pyelo. Check EKG, given dizziness.

## 2023-03-12 NOTE — ED PROVIDER NOTE - CARE PLAN
Principal Discharge DX:	Left flank pain  Secondary Diagnosis:	Dizziness   1 Principal Discharge DX:	Pyelonephritis

## 2023-03-12 NOTE — ED ADULT NURSE NOTE - CAS TRG GENERAL NORM CIRC DET
Please approve or deny     Last Visit Date:  2/18/2021       Next Visit Date:    Visit date not found Strong peripheral pulses

## 2023-03-12 NOTE — ED ADULT NURSE REASSESSMENT NOTE - PAIN RATING/NUMBER SCALE (0-10): ACTIVITY
Pt is requesting to speak with a nurse regarding medications.  Pt states she needs refills
0 (no pain/absence of nonverbal indicators of pain)

## 2023-03-12 NOTE — ED PROVIDER NOTE - PATIENT PORTAL LINK FT
You can access the FollowMyHealth Patient Portal offered by [facility] by registering at the following website: [email or web address]. By joining "[employer]’s FollowMyHealth portal, you will also be able to view your health information using other applications (apps) compatible with our system.

## 2023-03-12 NOTE — ED PROVIDER NOTE - PROGRESS NOTE DETAILS
Sendy: Na 127. Has had poor PO intake. Lactate 2.9. WBC 13K. Got CTX. Not on diuretic. Will give IVF. Repeat lactate and Na. If not improving significantly, then admit.

## 2023-03-12 NOTE — ED ADULT TRIAGE NOTE - CHIEF COMPLAINT QUOTE
Pt arrives in wheelchair to triage from urgent care. Endorses left flank pain w/ dysuria x2 wks and dizziness x4 days. Neg SOB. PMH: CAD, DM2, HTN, HLD.

## 2023-03-12 NOTE — ED PROVIDER NOTE - CLINICAL SUMMARY MEDICAL DECISION MAKING FREE TEXT BOX
non insulin dependent diabetic female presents with confirmed uti from urgent care in the setting of 2w urinary symptoms with bilateral back pain now with systemic symptoms for the first time today concerning for pyelo v less likely infected stone. will CT for possible stone v abscess. pt otherwise well appearing and may be a candidate for dc home on antibiotics. empiric IV abx initiated. dispo pending results.

## 2023-03-12 NOTE — ED PROVIDER NOTE - NSFOLLOWUPINSTRUCTIONS_ED_ALL_ED_FT
Over-the-counter Tylenol 500 mg (1 or 2 every 6 hours) and/or Meloxicam 15 mg daily for pain control. Over-the-counter Tylenol 500 mg (1 or 2 every 6 hours) and/or Meloxicam 15 mg daily for pain control.    Take the antibiotic that we sent to your pharmacy, as directed Please follow up with your primary care doctor within 1-2 days.    Over-the-counter Tylenol 500 mg (1 or 2 every 6 hours) and/or Meloxicam 15 mg daily for pain control.  Take the antibiotic that we sent to your pharmacy to help with your infection.     Maintain a well-balanced diet do avoid drop in your sodium.    Please come back to the emergency department immediately if you develop any worsening or concerning symptoms.

## 2023-03-13 VITALS
DIASTOLIC BLOOD PRESSURE: 68 MMHG | HEART RATE: 77 BPM | OXYGEN SATURATION: 100 % | SYSTOLIC BLOOD PRESSURE: 172 MMHG | RESPIRATION RATE: 16 BRPM | TEMPERATURE: 98 F

## 2023-08-01 ENCOUNTER — APPOINTMENT (OUTPATIENT)
Dept: VASCULAR SURGERY | Facility: CLINIC | Age: 78
End: 2023-08-01
Payer: MEDICARE

## 2023-08-01 VITALS
HEART RATE: 84 BPM | DIASTOLIC BLOOD PRESSURE: 80 MMHG | BODY MASS INDEX: 35.08 KG/M2 | HEIGHT: 63 IN | WEIGHT: 198 LBS | SYSTOLIC BLOOD PRESSURE: 177 MMHG

## 2023-08-01 DIAGNOSIS — I77.1 STRICTURE OF ARTERY: ICD-10-CM

## 2023-08-01 DIAGNOSIS — G47.62 SLEEP RELATED LEG CRAMPS: ICD-10-CM

## 2023-08-01 DIAGNOSIS — M79.2 NEURALGIA AND NEURITIS, UNSPECIFIED: ICD-10-CM

## 2023-08-01 DIAGNOSIS — I83.893 VARICOSE VEINS OF BILATERAL LOWER EXTREMITIES WITH OTHER COMPLICATIONS: ICD-10-CM

## 2023-08-01 DIAGNOSIS — I87.2 VENOUS INSUFFICIENCY (CHRONIC) (PERIPHERAL): ICD-10-CM

## 2023-08-01 DIAGNOSIS — R25.2 CRAMP AND SPASM: ICD-10-CM

## 2023-08-01 PROCEDURE — 99214 OFFICE O/P EST MOD 30 MIN: CPT | Mod: 25

## 2023-08-01 PROCEDURE — 93923 UPR/LXTR ART STDY 3+ LVLS: CPT

## 2023-08-01 PROCEDURE — ZZZZZ: CPT

## 2023-08-01 NOTE — ASSESSMENT
[Arterial/Venous Disease] : arterial/venous disease [Medication Management] : medication management [Other: _____] : [unfilled] [Foot care/Footwear] : foot care/footwear [FreeTextEntry1] : Impression  arterial  insuff and venous insff  clinically stable  current ongoing c/o  s/o lss and le  neurogenic or orthopedic source   [facility] management leg elevation,resume  knee high comp stockings  15-20mm Hg, wt loss, diet control, exercise program, protective measures  continue pletal 50 bid  referred pt to ortho  Dr Carmichael for hip eval and Dr Delgado for  ortho spine eval pt agrees to do so  ov w lamin/pvr s/o art insuff  12 mo  aug /2024 telephonic visit in  2-3 mo to re eval     letter faxed to Dr LAXMI Foreman MD    Varicose veins are enlarged, twisted veins. Varicose veins are caused by increased blood pressure in the veins.  The blood moves towards the heart by 1-way valves in the veins. When the valves become weakened or damaged, blood can collect in the veins and pool in your lower legs (ankles). This causes the veins to become enlarged and incompetent with reflux. Sitting or standing for long periods can cause blood to pool in the leg veins, increasing the pressure within the veins.  Risk factors for varicose veins or venous disease may include:  obesity, older age, standing or sitting for prolonged periods of time for several years, being female, pregnancy, taking oral contraceptive pills or hormone replacement, being inactive, and/or smoking.  The most common symptoms of varicose veins are sensations in the legs, such as a heavy feeling, burning, and/or aching. However, each individual may experience symptoms differently.  Other symptoms may include:  color changes in the skin, sores on the legs, or rash.  Severe varicose veins or venous disease may eventually produce long-term mild swelling that can result in more serious skin and tissue problems, such as ulcers and non-healing sores. Varicose veins and venous disease are diagnosed by a complete medical history, physical examination, and diagnostic studies for varicose veins including duplex ultrasound and color-flow imaging.   Medical treatment for varicose veins and venous disease include:  compression stockings, sclerotherapy, endovenous ablation and/or surgical treatment with microphlebectomy.

## 2023-08-01 NOTE — HISTORY OF PRESENT ILLNESS
[FreeTextEntry1] : pt is on pletal 50 bid\par  pt states elisha le and foot nocturnal  cramps have sig improved \par  pt is compliant w comp stockings\par  pt states no le swelling or discomfort  c/o at this time  [de-identified] : pt is on pletal 50 bid pt states elisha le and foot nocturnal  cramps have sig improved   but some remain  pt is compliant w comp stockings pt states no le wounds  pt states ongoing lower back pain w radiation to  buttocks and  left hip pain  and unable to sit and has difficulty walking  pt has not f/u w ortho or spine surg for eval  pt ambulates w cane now

## 2023-08-01 NOTE — REASON FOR VISIT
[Home] : at home, [unfilled] , at the time of the visit. [Medical Office: ([facility])___] : at the medical office located in  [Other:____] : [unfilled] [Verbal consent obtained from patient] : the patient, [unfilled] [FreeTextEntry1] : My  lower back and buttocks bother me

## 2023-08-01 NOTE — DATA REVIEWED
[FreeTextEntry1] : 12/26/2017 venous doppler Mikey le no acute dvt svt RLE GSV  ablated  LLE no sono evidence of reflux   1/31/2018 ROBBIE/PVR mild infragenic art insuff w vessel calcification rt robbie NC lt robbie NC   3/13/2019 ROBBIE/PVR mild infragenic art insuff w vessel calcification rt robbie NC lt robbie 1.17  6/22/2020 ROBBIE/PVR mild infragenic art insuff w vessel calcification RT robbie NC LT robbie 1.22  7/6/2021 ROBBIE/PVR mild infragenic art insuff w vessel calcification RT robbie NC LT robbie NC  7/12/2022 ROBBIE/PVR mild infragenic art insuff w vessel calcification RT robbie .61 LT robbie .56  7/12/2022 Venous Doppler Mikey LE  no acute dvt svt                              8/1/2023 ROBBIE/PVR mild infragenic art insuff w vessel calcification RT robbie .77 LT robbie .56

## 2023-08-01 NOTE — PHYSICAL EXAM
[No Rash or Lesion] : No rash or lesion [Alert] : alert [Oriented to Person] : oriented to person [Oriented to Place] : oriented to place [Oriented to Time] : oriented to time [Calm] : calm [JVD] : no jugular venous distention  [Normal Breath Sounds] : Normal breath sounds [1+] : left 1+ [Ankle Swelling (On Exam)] : present [Ankle Swelling Bilaterally] : bilaterally  [Ankle Swelling On The Left] : moderate [Varicose Veins Of Lower Extremities] : bilaterally [] : bilaterally [Ankle Swelling On The Right] : mild [Abdomen Masses] : No abdominal masses [No HSM] : no hepatosplenomegaly [Tender] : was nontender [de-identified] : nad [de-identified] : wnl [de-identified] : no resp distress [FreeTextEntry1] : Mild arterial insufficiency w mild  trophic skin changes  Mild bilateral leg venous insufficiency  w moderate bilateral leg stasis dermatitis, hyperpigmentation in the gator area, lipodermatosclerosis, hyperkeratosis (skin thickening) and  moderate bilateral leg edema  Multiple  bilateral leg small varicose  veins and spider veins  ant post medial calf and shin  RLE Varicose veins measuring 1-2  mm in size on the calf /shin  LLE Varicose veins measuring 1-2  mm in size on the calf /shin  no wounds/ulcers  [de-identified] :  ambulates slowly w cane  [de-identified] : Mikey Cranial nerves 2-12 mikey grossly intact [de-identified] : cooperative

## 2023-11-01 ENCOUNTER — APPOINTMENT (OUTPATIENT)
Dept: VASCULAR SURGERY | Facility: CLINIC | Age: 78
End: 2023-11-01

## 2024-05-29 ENCOUNTER — EMERGENCY (EMERGENCY)
Facility: HOSPITAL | Age: 79
LOS: 1 days | Discharge: HOME | End: 2024-05-29
Attending: STUDENT IN AN ORGANIZED HEALTH CARE EDUCATION/TRAINING PROGRAM
Payer: COMMERCIAL

## 2024-05-29 VITALS
HEART RATE: 68 BPM | SYSTOLIC BLOOD PRESSURE: 196 MMHG | RESPIRATION RATE: 18 BRPM | HEIGHT: 63 IN | TEMPERATURE: 98 F | DIASTOLIC BLOOD PRESSURE: 91 MMHG | OXYGEN SATURATION: 100 % | WEIGHT: 197.98 LBS

## 2024-05-29 DIAGNOSIS — Z90.49 ACQUIRED ABSENCE OF OTHER SPECIFIED PARTS OF DIGESTIVE TRACT: Chronic | ICD-10-CM

## 2024-05-29 LAB
ALBUMIN SERPL ELPH-MCNC: 3.5 G/DL — SIGNIFICANT CHANGE UP (ref 3.5–5)
ALP SERPL-CCNC: 52 U/L — SIGNIFICANT CHANGE UP (ref 40–120)
ALT FLD-CCNC: 19 U/L DA — SIGNIFICANT CHANGE UP (ref 10–60)
ANION GAP SERPL CALC-SCNC: 8 MMOL/L — SIGNIFICANT CHANGE UP (ref 5–17)
APPEARANCE UR: CLEAR — SIGNIFICANT CHANGE UP
AST SERPL-CCNC: 11 U/L — SIGNIFICANT CHANGE UP (ref 10–40)
BACTERIA # UR AUTO: ABNORMAL /HPF
BASOPHILS # BLD AUTO: 0.04 K/UL — SIGNIFICANT CHANGE UP (ref 0–0.2)
BASOPHILS NFR BLD AUTO: 0.3 % — SIGNIFICANT CHANGE UP (ref 0–2)
BILIRUB SERPL-MCNC: 0.4 MG/DL — SIGNIFICANT CHANGE UP (ref 0.2–1.2)
BILIRUB UR-MCNC: NEGATIVE — SIGNIFICANT CHANGE UP
BUN SERPL-MCNC: 10 MG/DL — SIGNIFICANT CHANGE UP (ref 7–18)
CALCIUM SERPL-MCNC: 9.1 MG/DL — SIGNIFICANT CHANGE UP (ref 8.4–10.5)
CHLORIDE SERPL-SCNC: 90 MMOL/L — LOW (ref 96–108)
CO2 SERPL-SCNC: 26 MMOL/L — SIGNIFICANT CHANGE UP (ref 22–31)
COLOR SPEC: YELLOW — SIGNIFICANT CHANGE UP
CREAT SERPL-MCNC: 0.95 MG/DL — SIGNIFICANT CHANGE UP (ref 0.5–1.3)
DIFF PNL FLD: ABNORMAL
EGFR: 61 ML/MIN/1.73M2 — SIGNIFICANT CHANGE UP
EOSINOPHIL # BLD AUTO: 0.05 K/UL — SIGNIFICANT CHANGE UP (ref 0–0.5)
EOSINOPHIL NFR BLD AUTO: 0.3 % — SIGNIFICANT CHANGE UP (ref 0–6)
EPI CELLS # UR: PRESENT
GLUCOSE SERPL-MCNC: 189 MG/DL — HIGH (ref 70–99)
GLUCOSE UR QL: NEGATIVE MG/DL — SIGNIFICANT CHANGE UP
HCT VFR BLD CALC: 36.5 % — SIGNIFICANT CHANGE UP (ref 34.5–45)
HGB BLD-MCNC: 12.3 G/DL — SIGNIFICANT CHANGE UP (ref 11.5–15.5)
IMM GRANULOCYTES NFR BLD AUTO: 0.4 % — SIGNIFICANT CHANGE UP (ref 0–0.9)
KETONES UR-MCNC: ABNORMAL MG/DL
LEUKOCYTE ESTERASE UR-ACNC: NEGATIVE — SIGNIFICANT CHANGE UP
LIDOCAIN IGE QN: 34 U/L — SIGNIFICANT CHANGE UP (ref 13–75)
LYMPHOCYTES # BLD AUTO: 1.76 K/UL — SIGNIFICANT CHANGE UP (ref 1–3.3)
LYMPHOCYTES # BLD AUTO: 12.1 % — LOW (ref 13–44)
MAGNESIUM SERPL-MCNC: 1.4 MG/DL — LOW (ref 1.6–2.6)
MCHC RBC-ENTMCNC: 28.2 PG — SIGNIFICANT CHANGE UP (ref 27–34)
MCHC RBC-ENTMCNC: 33.7 GM/DL — SIGNIFICANT CHANGE UP (ref 32–36)
MCV RBC AUTO: 83.7 FL — SIGNIFICANT CHANGE UP (ref 80–100)
MONOCYTES # BLD AUTO: 0.61 K/UL — SIGNIFICANT CHANGE UP (ref 0–0.9)
MONOCYTES NFR BLD AUTO: 4.2 % — SIGNIFICANT CHANGE UP (ref 2–14)
NEUTROPHILS # BLD AUTO: 12.05 K/UL — HIGH (ref 1.8–7.4)
NEUTROPHILS NFR BLD AUTO: 82.7 % — HIGH (ref 43–77)
NITRITE UR-MCNC: NEGATIVE — SIGNIFICANT CHANGE UP
NRBC # BLD: 0 /100 WBCS — SIGNIFICANT CHANGE UP (ref 0–0)
PH UR: 8 — SIGNIFICANT CHANGE UP (ref 5–8)
PLATELET # BLD AUTO: 350 K/UL — SIGNIFICANT CHANGE UP (ref 150–400)
POTASSIUM SERPL-MCNC: 4.1 MMOL/L — SIGNIFICANT CHANGE UP (ref 3.5–5.3)
POTASSIUM SERPL-SCNC: 4.1 MMOL/L — SIGNIFICANT CHANGE UP (ref 3.5–5.3)
PROT SERPL-MCNC: 7.6 G/DL — SIGNIFICANT CHANGE UP (ref 6–8.3)
PROT UR-MCNC: 300 MG/DL
RBC # BLD: 4.36 M/UL — SIGNIFICANT CHANGE UP (ref 3.8–5.2)
RBC # FLD: 12.6 % — SIGNIFICANT CHANGE UP (ref 10.3–14.5)
RBC CASTS # UR COMP ASSIST: 8 /HPF — HIGH (ref 0–4)
SODIUM SERPL-SCNC: 124 MMOL/L — LOW (ref 135–145)
SP GR SPEC: 1.01 — SIGNIFICANT CHANGE UP (ref 1–1.03)
UROBILINOGEN FLD QL: 0.2 MG/DL — SIGNIFICANT CHANGE UP (ref 0.2–1)
WBC # BLD: 14.57 K/UL — HIGH (ref 3.8–10.5)
WBC # FLD AUTO: 14.57 K/UL — HIGH (ref 3.8–10.5)
WBC UR QL: 2 /HPF — SIGNIFICANT CHANGE UP (ref 0–5)

## 2024-05-29 PROCEDURE — 70450 CT HEAD/BRAIN W/O DYE: CPT | Mod: MC

## 2024-05-29 PROCEDURE — 85025 COMPLETE CBC W/AUTO DIFF WBC: CPT

## 2024-05-29 PROCEDURE — 99285 EMERGENCY DEPT VISIT HI MDM: CPT

## 2024-05-29 PROCEDURE — 96374 THER/PROPH/DIAG INJ IV PUSH: CPT

## 2024-05-29 PROCEDURE — 84484 ASSAY OF TROPONIN QUANT: CPT

## 2024-05-29 PROCEDURE — 83735 ASSAY OF MAGNESIUM: CPT

## 2024-05-29 PROCEDURE — 80053 COMPREHEN METABOLIC PANEL: CPT

## 2024-05-29 PROCEDURE — 81001 URINALYSIS AUTO W/SCOPE: CPT

## 2024-05-29 PROCEDURE — 99285 EMERGENCY DEPT VISIT HI MDM: CPT | Mod: 25

## 2024-05-29 PROCEDURE — 96375 TX/PRO/DX INJ NEW DRUG ADDON: CPT

## 2024-05-29 PROCEDURE — 36415 COLL VENOUS BLD VENIPUNCTURE: CPT

## 2024-05-29 PROCEDURE — 83690 ASSAY OF LIPASE: CPT

## 2024-05-29 RX ORDER — METOCLOPRAMIDE HCL 10 MG
10 TABLET ORAL ONCE
Refills: 0 | Status: COMPLETED | OUTPATIENT
Start: 2024-05-29 | End: 2024-05-29

## 2024-05-29 RX ORDER — SODIUM CHLORIDE 9 MG/ML
1000 INJECTION INTRAMUSCULAR; INTRAVENOUS; SUBCUTANEOUS ONCE
Refills: 0 | Status: COMPLETED | OUTPATIENT
Start: 2024-05-29 | End: 2024-05-29

## 2024-05-29 RX ORDER — KETOROLAC TROMETHAMINE 30 MG/ML
15 SYRINGE (ML) INJECTION ONCE
Refills: 0 | Status: DISCONTINUED | OUTPATIENT
Start: 2024-05-29 | End: 2024-05-29

## 2024-05-29 RX ORDER — MECLIZINE HCL 12.5 MG
25 TABLET ORAL ONCE
Refills: 0 | Status: COMPLETED | OUTPATIENT
Start: 2024-05-29 | End: 2024-05-29

## 2024-05-29 RX ADMIN — Medication 10 MILLIGRAM(S): at 22:59

## 2024-05-29 RX ADMIN — Medication 15 MILLIGRAM(S): at 22:59

## 2024-05-29 RX ADMIN — Medication 25 MILLIGRAM(S): at 22:59

## 2024-05-29 RX ADMIN — SODIUM CHLORIDE 1000 MILLILITER(S): 9 INJECTION INTRAMUSCULAR; INTRAVENOUS; SUBCUTANEOUS at 23:01

## 2024-05-29 NOTE — ED PROVIDER NOTE - PROGRESS NOTE DETAILS
CTH:  No acute intracranial abnormality.  Mild brain volume loss.  White matter changes that likely represent microvascular disease.    Feels better, able to ambulate, will d/c with rx for meclizine PRN

## 2024-05-29 NOTE — ED PROVIDER NOTE - OBJECTIVE STATEMENT
89-year-old female, PMH of HTN, HLD, DM, presenting with dizziness, nausea and vomiting.  History translated by patient's son and daughter at bedside.  For the past 2 weeks the patient has had on and off dizziness and then vomits.  Patient also reporting headache.  States symptoms are worse when she turns her head.  No fever, chest pain or trouble breathing.  States she has abdominal pain but this pain is not new as she has a history of her hernia.

## 2024-05-29 NOTE — ED PROVIDER NOTE - PHYSICAL EXAMINATION
General: well appearing, no acute distress   HEENT: normocephalic, atraumatic   Respiratory: normal work of breathing  Abdomen: soft, non-tender, no guarding or rebound   MSK: no swelling or tenderness of lower extremities, moving all extremities spontaneously   Skin: warm, dry   Neuro: A&Ox3, cranial nerves II-XII intact   Psych: appropriate affect

## 2024-05-29 NOTE — ED PROVIDER NOTE - NSFOLLOWUPINSTRUCTIONS_ED_ALL_ED_FT
You were seen in the emergency department for: dizziness/vomiting  Your diagnosis for this visit was: vertigo  From this ED visit you were prescribed: meclizine to take as needed when you develop dizziness. You do not need to take this if you are not feeling dizzy.  We recommend you follow up with: your primary care doctor    Please return to the Emergency Department if you experience any of the following symptoms:   - Shortness of breath or trouble breathing  - Pressure, pain or tightness in the chest  - Face drooping, arm weakness or speech difficulty  - Persistence of severe vomiting  - Head injury or loss of consciousness  - Nonstop bleeding or an open wound    (1) Follow up with your primary care physician within the next 24-48 hours as discussed. In addition, we did not find evidence of a life threatening illness on your testing here today, but listed below are the specialists that will be necessary to see as an outpatient to continue the workup.  Please call the numbers listed below or [phone] to set up the necessary appointments.  (2) Take Tylenol (up to 1000mg or 1 g)  and/or Motrin (up to 600mg) up to every 6 hours as needed for pain.   (3) If you had an IV (intravenous) line placed, it was removed. Sometimes, after IV removal, that area can be tender for a few days; if it develops redness and swelling, those could be signs of infection; in which case, return to the Emergency Department for assessment.  (4) Please continue taking all of your home medications as directed.

## 2024-05-29 NOTE — ED PROVIDER NOTE - PATIENT PORTAL LINK FT
You can access the FollowMyHealth Patient Portal offered by [facility] by registering at the following website: [email or web address]. By joining [employer]’s FollowMyHealth portal, you will also be able to view your health information using other applications (apps) compatible with our system.

## 2024-05-29 NOTE — ED PROVIDER NOTE - CLINICAL SUMMARY MEDICAL DECISION MAKING FREE TEXT BOX
79-year-old female presenting with dizziness and vomiting.  Nonfocal neurological exam.  Nontender abdomen on exam.  Symptoms likely secondary to benign vertigo.  Low concern for CVA.  Will obtain labs and treat symptoms.

## 2024-05-30 VITALS
RESPIRATION RATE: 18 BRPM | TEMPERATURE: 98 F | HEART RATE: 70 BPM | DIASTOLIC BLOOD PRESSURE: 83 MMHG | SYSTOLIC BLOOD PRESSURE: 183 MMHG | OXYGEN SATURATION: 100 %

## 2024-05-30 PROCEDURE — 70450 CT HEAD/BRAIN W/O DYE: CPT | Mod: 26,MC

## 2024-05-30 RX ORDER — MECLIZINE HCL 12.5 MG
1 TABLET ORAL
Qty: 15 | Refills: 0
Start: 2024-05-30 | End: 2024-06-03

## 2024-05-30 RX ORDER — MECLIZINE HYDROCHLORIDE 25 MG/1
1 TABLET ORAL
Qty: 15 | Refills: 0 | DISCHARGE
Start: 2024-05-30 | End: 2024-06-03

## 2024-05-30 NOTE — ED ADULT NURSE NOTE - NSFALLRISKINTERV_ED_ALL_ED

## 2024-06-07 NOTE — ED PROVIDER NOTE - DISPOSITION TYPE
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     
DISCHARGE

## 2024-06-09 ENCOUNTER — EMERGENCY (EMERGENCY)
Facility: HOSPITAL | Age: 79
LOS: 1 days | Discharge: HOME | End: 2024-06-09
Attending: EMERGENCY MEDICINE | Admitting: EMERGENCY MEDICINE
Payer: MEDICARE

## 2024-06-09 VITALS
TEMPERATURE: 98 F | DIASTOLIC BLOOD PRESSURE: 84 MMHG | OXYGEN SATURATION: 100 % | RESPIRATION RATE: 16 BRPM | SYSTOLIC BLOOD PRESSURE: 181 MMHG | HEART RATE: 76 BPM

## 2024-06-09 VITALS
HEIGHT: 63 IN | TEMPERATURE: 98 F | HEART RATE: 72 BPM | OXYGEN SATURATION: 98 % | DIASTOLIC BLOOD PRESSURE: 77 MMHG | RESPIRATION RATE: 16 BRPM | WEIGHT: 184.97 LBS | SYSTOLIC BLOOD PRESSURE: 193 MMHG

## 2024-06-09 DIAGNOSIS — Z90.49 ACQUIRED ABSENCE OF OTHER SPECIFIED PARTS OF DIGESTIVE TRACT: Chronic | ICD-10-CM

## 2024-06-09 LAB
ALBUMIN SERPL ELPH-MCNC: 3.9 G/DL — SIGNIFICANT CHANGE UP (ref 3.3–5)
ALP SERPL-CCNC: 48 U/L — SIGNIFICANT CHANGE UP (ref 40–120)
ALT FLD-CCNC: 11 U/L — SIGNIFICANT CHANGE UP (ref 4–33)
ANION GAP SERPL CALC-SCNC: 13 MMOL/L — SIGNIFICANT CHANGE UP (ref 7–14)
APTT BLD: 30.8 SEC — SIGNIFICANT CHANGE UP (ref 24.5–35.6)
AST SERPL-CCNC: 14 U/L — SIGNIFICANT CHANGE UP (ref 4–32)
BASE EXCESS BLDV CALC-SCNC: -1.3 MMOL/L — SIGNIFICANT CHANGE UP (ref -2–3)
BASOPHILS # BLD AUTO: 0.06 K/UL — SIGNIFICANT CHANGE UP (ref 0–0.2)
BASOPHILS NFR BLD AUTO: 0.5 % — SIGNIFICANT CHANGE UP (ref 0–2)
BILIRUB SERPL-MCNC: 0.3 MG/DL — SIGNIFICANT CHANGE UP (ref 0.2–1.2)
BLOOD GAS VENOUS COMPREHENSIVE RESULT: SIGNIFICANT CHANGE UP
BUN SERPL-MCNC: 16 MG/DL — SIGNIFICANT CHANGE UP (ref 7–23)
CALCIUM SERPL-MCNC: 9.1 MG/DL — SIGNIFICANT CHANGE UP (ref 8.4–10.5)
CHLORIDE BLDV-SCNC: 96 MMOL/L — SIGNIFICANT CHANGE UP (ref 96–108)
CHLORIDE SERPL-SCNC: 91 MMOL/L — LOW (ref 98–107)
CO2 BLDV-SCNC: 26.7 MMOL/L — HIGH (ref 22–26)
CO2 SERPL-SCNC: 24 MMOL/L — SIGNIFICANT CHANGE UP (ref 22–31)
CREAT SERPL-MCNC: 1.06 MG/DL — SIGNIFICANT CHANGE UP (ref 0.5–1.3)
EGFR: 53 ML/MIN/1.73M2 — LOW
EOSINOPHIL # BLD AUTO: 0.23 K/UL — SIGNIFICANT CHANGE UP (ref 0–0.5)
EOSINOPHIL NFR BLD AUTO: 1.7 % — SIGNIFICANT CHANGE UP (ref 0–6)
GAS PNL BLDV: 128 MMOL/L — LOW (ref 136–145)
GAS PNL BLDV: SIGNIFICANT CHANGE UP
GLUCOSE BLDV-MCNC: 171 MG/DL — HIGH (ref 70–99)
GLUCOSE SERPL-MCNC: 164 MG/DL — HIGH (ref 70–99)
HCO3 BLDV-SCNC: 25 MMOL/L — SIGNIFICANT CHANGE UP (ref 22–29)
HCT VFR BLD CALC: 35.1 % — SIGNIFICANT CHANGE UP (ref 34.5–45)
HCT VFR BLDA CALC: 35 % — SIGNIFICANT CHANGE UP (ref 34.5–46.5)
HGB BLD CALC-MCNC: 11.8 G/DL — SIGNIFICANT CHANGE UP (ref 11.7–16.1)
HGB BLD-MCNC: 11.9 G/DL — SIGNIFICANT CHANGE UP (ref 11.5–15.5)
IANC: 10.12 K/UL — HIGH (ref 1.8–7.4)
IMM GRANULOCYTES NFR BLD AUTO: 0.4 % — SIGNIFICANT CHANGE UP (ref 0–0.9)
INR BLD: 0.94 RATIO — SIGNIFICANT CHANGE UP (ref 0.85–1.18)
LACTATE BLDV-MCNC: 3.1 MMOL/L — HIGH (ref 0.5–2)
LYMPHOCYTES # BLD AUTO: 1.83 K/UL — SIGNIFICANT CHANGE UP (ref 1–3.3)
LYMPHOCYTES # BLD AUTO: 13.8 % — SIGNIFICANT CHANGE UP (ref 13–44)
MCHC RBC-ENTMCNC: 29 PG — SIGNIFICANT CHANGE UP (ref 27–34)
MCHC RBC-ENTMCNC: 33.9 GM/DL — SIGNIFICANT CHANGE UP (ref 32–36)
MCV RBC AUTO: 85.6 FL — SIGNIFICANT CHANGE UP (ref 80–100)
MONOCYTES # BLD AUTO: 0.95 K/UL — HIGH (ref 0–0.9)
MONOCYTES NFR BLD AUTO: 7.2 % — SIGNIFICANT CHANGE UP (ref 2–14)
NEUTROPHILS # BLD AUTO: 10.12 K/UL — HIGH (ref 1.8–7.4)
NEUTROPHILS NFR BLD AUTO: 76.4 % — SIGNIFICANT CHANGE UP (ref 43–77)
NRBC # BLD: 0 /100 WBCS — SIGNIFICANT CHANGE UP (ref 0–0)
NRBC # FLD: 0 K/UL — SIGNIFICANT CHANGE UP (ref 0–0)
PCO2 BLDV: 49 MMHG — SIGNIFICANT CHANGE UP (ref 39–52)
PH BLDV: 7.32 — SIGNIFICANT CHANGE UP (ref 7.32–7.43)
PLATELET # BLD AUTO: 360 K/UL — SIGNIFICANT CHANGE UP (ref 150–400)
PO2 BLDV: 33 MMHG — SIGNIFICANT CHANGE UP (ref 25–45)
POTASSIUM BLDV-SCNC: 4.5 MMOL/L — SIGNIFICANT CHANGE UP (ref 3.5–5.1)
POTASSIUM SERPL-MCNC: 4.7 MMOL/L — SIGNIFICANT CHANGE UP (ref 3.5–5.3)
POTASSIUM SERPL-SCNC: 4.7 MMOL/L — SIGNIFICANT CHANGE UP (ref 3.5–5.3)
PROT SERPL-MCNC: 7.2 G/DL — SIGNIFICANT CHANGE UP (ref 6–8.3)
PROTHROM AB SERPL-ACNC: 10.5 SEC — SIGNIFICANT CHANGE UP (ref 9.5–13)
RBC # BLD: 4.1 M/UL — SIGNIFICANT CHANGE UP (ref 3.8–5.2)
RBC # FLD: 12.5 % — SIGNIFICANT CHANGE UP (ref 10.3–14.5)
SAO2 % BLDV: 50.2 % — LOW (ref 67–88)
SODIUM SERPL-SCNC: 128 MMOL/L — LOW (ref 135–145)
TROPONIN T, HIGH SENSITIVITY RESULT: 13 NG/L — SIGNIFICANT CHANGE UP
WBC # BLD: 13.24 K/UL — HIGH (ref 3.8–10.5)
WBC # FLD AUTO: 13.24 K/UL — HIGH (ref 3.8–10.5)

## 2024-06-09 PROCEDURE — 70450 CT HEAD/BRAIN W/O DYE: CPT | Mod: 26,MC,XU

## 2024-06-09 PROCEDURE — 0042T: CPT

## 2024-06-09 PROCEDURE — 70496 CT ANGIOGRAPHY HEAD: CPT | Mod: 26,MC

## 2024-06-09 PROCEDURE — 99285 EMERGENCY DEPT VISIT HI MDM: CPT

## 2024-06-09 PROCEDURE — 70498 CT ANGIOGRAPHY NECK: CPT | Mod: 26,MC

## 2024-06-09 NOTE — ED PROVIDER NOTE - PHYSICAL EXAMINATION
Gen: WDWN, NAD  HEENT: EOMI, PERRLA no nasal discharge, mucous membranes moist  CV: RRR, +S1/S2, no M/R/G, 2+ radial pulses b/l  Resp: CTAB, no W/R/R, no accessory muscle use, no increased work of breathing  GI: Abdomen soft non-distended, NTTP  MSK: No open wounds, no bruising, no LE edema  Neuro: CN II-XII grossly intact. finger to nose intact b/l. no pronator drift. A&Ox4, following commands, moving all four extremities spontaneously  Psych: appropriate mood

## 2024-06-09 NOTE — ED PROVIDER NOTE - ATTENDING CONTRIBUTION TO CARE
patient well-appearing no acute distress HEENT unremarkable cranial nerves II through XII intact neck supple no JVD no carotid bruits heard heart sounds normal lungs are clear abdomen soft nontender no focal neurological deficit oriented x 3.

## 2024-06-09 NOTE — ED PROVIDER NOTE - NSICDXPASTMEDICALHX_GEN_ALL_CORE_FT
PAST MEDICAL HISTORY:  Abdominal obesity     Benign essential hypertension     CAD (coronary atherosclerotic disease) s/p stent    DM type 2 (diabetes mellitus, type 2)     Dyslipidemia

## 2024-06-09 NOTE — ED ADULT NURSE REASSESSMENT NOTE - NS ED NURSE REASSESS COMMENT FT1
Received patient into  are, alert and oriented x 4, no slurring of speech, moving all limbs no weakness noted equal strength noted, equal pulses noted. No facial deviation. IV on right arm, g 20. Vital sign stable, family at bedside.

## 2024-06-09 NOTE — ED ADULT NURSE NOTE - OBJECTIVE STATEMENT
Patient is a 80 yo female, phx DM2, HTN, HLD, CAD, SAH, presenting with episode of inability to speak for 10 minutes at 1240. A&Ox4, no signs of distress, no neuro deficits at this time, received to CT as code stroke. Patient's family reports she was following commands but unable to speak for 10 minutes. Patient with no complaints at this time. 20G PIV placed to RAC, labs sent per orders. Patient in CT. Report given to primary RN Parris. Fall precautions maintained.

## 2024-06-09 NOTE — ED PROVIDER NOTE - NSICDXPASTSURGICALHX_GEN_ALL_CORE_FT
PAST SURGICAL HISTORY:  H/O: hysterectomy     History of cholecystectomy     S/P  Section     S/P hemorrhoidectomy

## 2024-06-09 NOTE — ED PROVIDER NOTE - PATIENT PORTAL LINK FT
You can access the FollowMyHealth Patient Portal offered by [facility] by registering at the following website: [email or web address]. By joining [employer]’s FollowMyHealth portal, you will also be able to view your health information using other applications (apps) compatible with our system.

## 2024-06-09 NOTE — ED PROVIDER NOTE - CARE PROVIDER_API CALL
Reyes Holly  Neurology  [address], [address]  [place] [address]  Phone: [phone]  Fax: [phone]  Follow Up Time:

## 2024-06-09 NOTE — CONSULT NOTE ADULT - SUBJECTIVE AND OBJECTIVE BOX
HPI: 79 year old RH female with past medical history of SAH with IVH in 2015, DM, CAD, and HLD presents to the ED as a code stroke for an acute onset of speech difficulty.  Per family she was in her normal state of health today until about 1240 this afternoon she was eating breakfast and she suddenly wasn't able to speak. She said she was able to see and understand during the episode but was not able to speak, and her eyes remained open.  No shaking noted.   Episode lasted about 10 minutes total, and she is back at her baseline now. She had 1 week of nausea, vomiting, and headache, diarrhea, and dizziness, so she went to [facility] and was diagnosed with vertigo.  Also for the past few weeks has been seeing things that are not there- seeing mosquitos in the room and seeing her aide who was in another room.  No AC/AP at baseline.      (Stroke only)  NIHSS: 3  MRS: 3    A 10-system ROS was performed and is negative except for those items noted above and/or in the HPI.    PAST MEDICAL & SURGICAL HISTORY:  Abdominal obesity  Benign essential hypertension  CAD (coronary atherosclerotic disease)  s/p stent  DM type 2 (diabetes mellitus, type 2)  Dyslipidemia  H/O: hysterectomy  S/P  Section  S/P hemorrhoidectomy  History of cholecystectomy    FAMILY HISTORY:  No pertinent family history in first degree relatives      SOCIAL HISTORY:       MEDICATIONS (HOME):  Home Medications:  albuterol 2.5 mg/3 mL (0.083%) inhalation solution: 2.5 milligram(s) inhaled every 6 hours (2016 15:49)  atenolol 50 mg oral tablet: 1 tab(s) orally once a day (2016 15:49)  ferrous sulfate 325 mg (65 mg elemental iron) oral delayed release tablet: 1 tab(s) orally once a day (2016 15:49)  gabapentin 100 mg oral capsule: 1 cap(s) orally once a day (at bedtime) (2016 15:49)  glimepiride 4 mg oral tablet: 4 milligram(s) orally 2 times a day (2016 15:49)  lisinopril 10 mg oral tablet: 1 tab(s) orally once a day (2016 15:49)  metFORMIN 1000 mg oral tablet: 1 tab(s) orally 2 times a day (2016 15:49)  multivitamin: 1 tab(s) orally once a day (2016 15:49)  Norvasc 2.5 mg oral tablet: 1 tab(s) orally once a day (2016 15:49)  Os-Cristian: 1  orally once a day (2016 15:49)  pioglitazone 30 mg oral tablet: 1 tab(s) orally once a day (2016 15:49)  ProAir HFA 90 mcg/inh inhalation aerosol: 2 puff(s) inhaled 4 times a day, As Needed (2016 15:49)  simvastatin 40 mg oral tablet: 1 tab(s) orally once a day (at bedtime) (2016 15:49)  Tradjenta 5 mg oral tablet: 1 tab(s) orally once a day (2016 15:49)    MEDICATIONS  (STANDING):    MEDICATIONS  (PRN):    ALLERGIES/INTOLERANCES:  Allergies  penicillin (Rash)  sulfa drugs (Rash)  penicillin (Unknown)  penicillin (Anaphylaxis)  Sulfur (Anaphylaxis)  sulfa drugs (Unknown)    Intolerances    VITALS & EXAMINATION:  Vital Signs Last 24 Hrs  T(C): 36.7 (2024 14:14), Max: 36.7 (2024 14:14)  T(F): 98 (2024 14:14), Max: 98 (2024 14:14)  HR: 72 (2024 14:14) (72 - 72)  BP: 193/77 (2024 14:14) (193/77 - 193/77)  RR: 16 (2024 14:14) (16 - 16)  SpO2: 98% (2024 14:14) (98% - 98%)    Parameters below as of 2024 14:14  Patient On (Oxygen Delivery Method): room air    General:  Constitutional: Female, appears stated age, in no apparent distress including pain  Head: Normocephalic & Atraumatic.  Respiratory: Breathing comfortably.  Extremities: No cyanosis, clubbing, or edema    Neurological:  MS: Eyes open, awake, alert, oriented to person, place, and situation, initially said May for month, then corrected to . Follows all commands.  IDs objects.    Language: Speech is clear, fluent with good repetition & comprehension.  CNs: PERRL (R = 3mm, L = 3mm). VFF. EOMI no nystagmus. V1-3 intact to LT b/l. No facial asymmetry b/l.    Motor: Normal muscle bulk & tone. No noticeable tremor. No UE drifts, b/l LE drifts.               Deltoid	Biceps	Triceps	   R	    5	     5	     5	 5	  		 	  L	    5	     5	     5	 5	  		    	H-Flex	K-Flex	K-Ext	D-Flex	P-Flex  R	    5	   5	   5	   5	    5		   L	    5	   5	    5	   5	    5		     Sensation: Intact to LT b/l throughout.   Cortical: Extinction on DSS (neglect): none  Reflexes:              Biceps(C5)       BR(C6)     Triceps(C7)               Patellar(L4)    Achilles(S1)    Plantar Resp  R	      2	            2	        2		              2                  2		      Down   L	      2	            2	        2		              2	          2		      Down     Coordination:  No dysmetria to FTN b/l.    Gait: Deferred.     LABORATORY:       Radiology (XR, CT, MR, U/S, TTE/MARGARITA): HPI: 79 year old RH female with past medical history of SAH with IVH in 2015, DM, CAD, and HLD presents to the ED as a code stroke for an acute onset of speech difficulty.  Per family she was in her normal state of health today until about 1240 this afternoon she was eating breakfast and she suddenly wasn't able to speak. She said she was able to see and understand during the episode but was not able to speak, and her eyes remained open.  No shaking noted.   Episode lasted about 10 minutes total, and she is back at her baseline now. She had 1 week of nausea, vomiting, and headache, diarrhea, and dizziness, so she went to [facility] and was diagnosed with vertigo.  Also for the past few weeks has been seeing things that are not there- seeing mosquitos in the room and seeing her aide who was in another room. Per daughter at bedside she has also noticing a decline in memory for the past 2 weeks.   No AC/AP at baseline.      (Stroke only)  NIHSS: 2  MRS: 3    A 10-system ROS was performed and is negative except for those items noted above and/or in the HPI.    PAST MEDICAL & SURGICAL HISTORY:  Abdominal obesity  Benign essential hypertension  CAD (coronary atherosclerotic disease)  s/p stent  DM type 2 (diabetes mellitus, type 2)  Dyslipidemia  H/O: hysterectomy  S/P  Section  S/P hemorrhoidectomy  History of cholecystectomy    FAMILY HISTORY:  No pertinent family history in first degree relatives      SOCIAL HISTORY:       MEDICATIONS (HOME):  Home Medications:  albuterol 2.5 mg/3 mL (0.083%) inhalation solution: 2.5 milligram(s) inhaled every 6 hours (2016 15:49)  atenolol 50 mg oral tablet: 1 tab(s) orally once a day (2016 15:49)  ferrous sulfate 325 mg (65 mg elemental iron) oral delayed release tablet: 1 tab(s) orally once a day (2016 15:49)  gabapentin 100 mg oral capsule: 1 cap(s) orally once a day (at bedtime) (2016 15:49)  glimepiride 4 mg oral tablet: 4 milligram(s) orally 2 times a day (2016 15:49)  lisinopril 10 mg oral tablet: 1 tab(s) orally once a day (2016 15:49)  metFORMIN 1000 mg oral tablet: 1 tab(s) orally 2 times a day (2016 15:49)  multivitamin: 1 tab(s) orally once a day (2016 15:49)  Norvasc 2.5 mg oral tablet: 1 tab(s) orally once a day (2016 15:49)  Os-Cristian: 1  orally once a day (2016 15:49)  pioglitazone 30 mg oral tablet: 1 tab(s) orally once a day (2016 15:49)  ProAir HFA 90 mcg/inh inhalation aerosol: 2 puff(s) inhaled 4 times a day, As Needed (2016 15:49)  simvastatin 40 mg oral tablet: 1 tab(s) orally once a day (at bedtime) (2016 15:49)  Tradjenta 5 mg oral tablet: 1 tab(s) orally once a day (2016 15:49)    MEDICATIONS  (STANDING):    MEDICATIONS  (PRN):    ALLERGIES/INTOLERANCES:  Allergies  penicillin (Rash)  sulfa drugs (Rash)  penicillin (Unknown)  penicillin (Anaphylaxis)  Sulfur (Anaphylaxis)  sulfa drugs (Unknown)    Intolerances    VITALS & EXAMINATION:  Vital Signs Last 24 Hrs  T(C): 36.7 (2024 14:14), Max: 36.7 (2024 14:14)  T(F): 98 (2024 14:14), Max: 98 (2024 14:14)  HR: 72 (2024 14:14) (72 - 72)  BP: 193/77 (2024 14:14) (193/77 - 193/77)  RR: 16 (2024 14:14) (16 - 16)  SpO2: 98% (2024 14:14) (98% - 98%)    Parameters below as of 2024 14:14  Patient On (Oxygen Delivery Method): room air    General:  Constitutional: Female, appears stated age, in no apparent distress including pain  Head: Normocephalic & Atraumatic.  Respiratory: Breathing comfortably.  Extremities: No cyanosis, clubbing, or edema    Neurological:  MS: Eyes open, awake, alert, oriented to person, place, and situation, initially said May for month, then corrected to . Follows all commands.  IDs objects.    Language: Speech is clear, fluent with good repetition & comprehension.  CNs: PERRL (R = 3mm, L = 3mm). VFF. EOMI no nystagmus. V1-3 intact to LT b/l. No facial asymmetry b/l.    Motor: Normal muscle bulk & tone. No noticeable tremor. No UE drifts, b/l LE drifts.               Deltoid	Biceps	Triceps	   R	    5	     5	     5	 5	  		 	  L	    5	     5	     5	 5	  		    	H-Flex	K-Flex	K-Ext	D-Flex	P-Flex  R	    5	   5	   5	   5	    5		   L	    5	   5	    5	   5	    5		     Sensation: Intact to LT b/l throughout.   Cortical: Extinction on DSS (neglect): none  Reflexes:              Biceps(C5)       BR(C6)     Triceps(C7)               Patellar(L4)    Achilles(S1)    Plantar Resp  R	      2	            2	        2		              2                  2		      Down   L	      2	            2	        2		              2	          2		      Down     Coordination:  No dysmetria to FTN b/l.    Gait: Deferred.     LABORATORY:       Radiology (XR, CT, MR, U/S, TTE/MARGARITA):    CTH: No CT evidence of acute intracranial hemorrhage, brain edema, or mass effect. Consider MRI as clinically warranted. HPI: 79 year old RH female with past medical history of SAH with IVH in 2015, DM, CAD, and HLD presents to the ED as a code stroke for an acute onset of speech difficulty.  Per family she was in her normal state of health today until about 1240 this afternoon she was eating breakfast and she suddenly wasn't able to speak. She said she was able to see and understand during the episode but was not able to speak, and her eyes remained open.  No shaking noted.   Episode lasted about 10 minutes total, and she is back at her baseline now. She had 1 week of nausea, vomiting, and headache, diarrhea, and dizziness, so she went to [facility] and was diagnosed with vertigo.  Also for the past few weeks has been seeing things that are not there- seeing mosquitos in the room and seeing her aide who was in another room. Per daughter at bedside she has also noticing a decline in memory for the past 2 weeks.   No AC/AP at baseline.      (Stroke only)  NIHSS: 2  MRS: 3    A 10-system ROS was performed and is negative except for those items noted above and/or in the HPI.    PAST MEDICAL & SURGICAL HISTORY:  Abdominal obesity  Benign essential hypertension  CAD (coronary atherosclerotic disease)  s/p stent  DM type 2 (diabetes mellitus, type 2)  Dyslipidemia  H/O: hysterectomy  S/P  Section  S/P hemorrhoidectomy  History of cholecystectomy    FAMILY HISTORY:  No pertinent family history in first degree relatives      SOCIAL HISTORY:       MEDICATIONS (HOME):  Home Medications:  albuterol 2.5 mg/3 mL (0.083%) inhalation solution: 2.5 milligram(s) inhaled every 6 hours (2016 15:49)  atenolol 50 mg oral tablet: 1 tab(s) orally once a day (2016 15:49)  ferrous sulfate 325 mg (65 mg elemental iron) oral delayed release tablet: 1 tab(s) orally once a day (2016 15:49)  gabapentin 100 mg oral capsule: 1 cap(s) orally once a day (at bedtime) (2016 15:49)  glimepiride 4 mg oral tablet: 4 milligram(s) orally 2 times a day (2016 15:49)  lisinopril 10 mg oral tablet: 1 tab(s) orally once a day (2016 15:49)  metFORMIN 1000 mg oral tablet: 1 tab(s) orally 2 times a day (2016 15:49)  multivitamin: 1 tab(s) orally once a day (2016 15:49)  Norvasc 2.5 mg oral tablet: 1 tab(s) orally once a day (2016 15:49)  Os-Cristian: 1  orally once a day (2016 15:49)  pioglitazone 30 mg oral tablet: 1 tab(s) orally once a day (2016 15:49)  ProAir HFA 90 mcg/inh inhalation aerosol: 2 puff(s) inhaled 4 times a day, As Needed (2016 15:49)  simvastatin 40 mg oral tablet: 1 tab(s) orally once a day (at bedtime) (2016 15:49)  Tradjenta 5 mg oral tablet: 1 tab(s) orally once a day (2016 15:49)    MEDICATIONS  (STANDING):    MEDICATIONS  (PRN):    ALLERGIES/INTOLERANCES:  Allergies  penicillin (Rash)  sulfa drugs (Rash)  penicillin (Unknown)  penicillin (Anaphylaxis)  Sulfur (Anaphylaxis)  sulfa drugs (Unknown)    Intolerances    VITALS & EXAMINATION:  Vital Signs Last 24 Hrs  T(C): 36.7 (2024 14:14), Max: 36.7 (2024 14:14)  T(F): 98 (2024 14:14), Max: 98 (2024 14:14)  HR: 72 (2024 14:14) (72 - 72)  BP: 193/77 (2024 14:14) (193/77 - 193/77)  RR: 16 (2024 14:14) (16 - 16)  SpO2: 98% (2024 14:14) (98% - 98%)    Parameters below as of 2024 14:14  Patient On (Oxygen Delivery Method): room air    General:  Constitutional: Female, appears stated age, in no apparent distress including pain  Head: Normocephalic & Atraumatic.  Respiratory: Breathing comfortably.  Extremities: No cyanosis, clubbing, or edema    Neurological:  MS: Eyes open, awake, alert, oriented to person, place, and situation, initially said May for month, then corrected to . Follows all commands.  IDs objects.    Language: Speech is clear, fluent with good repetition & comprehension.  CNs: PERRL (R = 3mm, L = 3mm). VFF. EOMI no nystagmus. V1-3 intact to LT b/l. No facial asymmetry b/l.    Motor: Normal muscle bulk & tone. No noticeable tremor. No UE drifts, b/l LE drifts.               Deltoid	Biceps	Triceps	   R	    5	     5	     5	 5	  		 	  L	    5	     5	     5	 5	  		    	H-Flex	K-Flex	K-Ext	D-Flex	P-Flex  R	    5	   5	   5	   5	    5		   L	    5	   5	    5	   5	    5		     Sensation: Intact to LT b/l throughout.   Cortical: Extinction on DSS (neglect): none  Reflexes:              Biceps(C5)       BR(C6)     Triceps(C7)               Patellar(L4)    Achilles(S1)    Plantar Resp  R	      2	            2	        2		              2                  2		      Down   L	      2	            2	        2		              2	          2		      Down     Coordination:  No dysmetria to FTN b/l.    Gait: Deferred.     LABORATORY:                          11.9   13.24 )-----------( 360      ( 2024 14:37 )             35.1     06-    128<L>  |  91<L>  |  16  ----------------------------<  164<H>  4.7   |  24  |  1.06    Ca    9.1      2024 14:37    TPro  7.2  /  Alb  3.9  /  TBili  0.3  /  DBili  x   /  AST  14  /  ALT  11  /  AlkPhos  48  06-09    PT/INR - ( 2024 14:37 )   PT: 10.5 sec;   INR: 0.94 ratio         PTT - ( 2024 14:37 )  PTT:30.8 sec       Radiology (XR, CT, MR, U/S, TTE/MARGARITA):    CTH: No CT evidence of acute intracranial hemorrhage, brain edema, or mass effect. Consider MRI as clinically warranted. HPI: 79 year old RH female with past medical history of SAH with IVH in 2015, DM, CAD, and HLD presents to the ED as a code stroke for an acute onset of speech difficulty.  Per family she was in her normal state of health today until about 1240 this afternoon she was eating breakfast and she suddenly wasn't able to speak. She said she was able to see and understand during the episode but was not able to speak, and her eyes remained open.  No shaking noted.   Episode lasted about 10 minutes total, and she is back at her baseline now. She had 1 week of nausea, vomiting, and headache, diarrhea, and dizziness, so she went to [facility] and was diagnosed with vertigo.  Also for the past few weeks has been seeing things that are not there- seeing mosquitos in the room and seeing her aide who was in another room. Per daughter at bedside she has also noticing a decline in memory for the past 2 weeks.   No AC/AP at baseline.      (Stroke only)  NIHSS: 2  MRS: 3    A 10-system ROS was performed and is negative except for those items noted above and/or in the HPI.    PAST MEDICAL & SURGICAL HISTORY:  Abdominal obesity  Benign essential hypertension  CAD (coronary atherosclerotic disease)  s/p stent  DM type 2 (diabetes mellitus, type 2)  Dyslipidemia  H/O: hysterectomy  S/P  Section  S/P hemorrhoidectomy  History of cholecystectomy    FAMILY HISTORY:  No pertinent family history in first degree relatives      SOCIAL HISTORY:       MEDICATIONS (HOME):  Home Medications:  albuterol 2.5 mg/3 mL (0.083%) inhalation solution: 2.5 milligram(s) inhaled every 6 hours (2016 15:49)  atenolol 50 mg oral tablet: 1 tab(s) orally once a day (2016 15:49)  ferrous sulfate 325 mg (65 mg elemental iron) oral delayed release tablet: 1 tab(s) orally once a day (2016 15:49)  gabapentin 100 mg oral capsule: 1 cap(s) orally once a day (at bedtime) (2016 15:49)  glimepiride 4 mg oral tablet: 4 milligram(s) orally 2 times a day (2016 15:49)  lisinopril 10 mg oral tablet: 1 tab(s) orally once a day (2016 15:49)  metFORMIN 1000 mg oral tablet: 1 tab(s) orally 2 times a day (2016 15:49)  multivitamin: 1 tab(s) orally once a day (2016 15:49)  Norvasc 2.5 mg oral tablet: 1 tab(s) orally once a day (2016 15:49)  Os-Cristian: 1  orally once a day (2016 15:49)  pioglitazone 30 mg oral tablet: 1 tab(s) orally once a day (2016 15:49)  ProAir HFA 90 mcg/inh inhalation aerosol: 2 puff(s) inhaled 4 times a day, As Needed (2016 15:49)  simvastatin 40 mg oral tablet: 1 tab(s) orally once a day (at bedtime) (2016 15:49)  Tradjenta 5 mg oral tablet: 1 tab(s) orally once a day (2016 15:49)    MEDICATIONS  (STANDING):    MEDICATIONS  (PRN):    ALLERGIES/INTOLERANCES:  Allergies  penicillin (Rash)  sulfa drugs (Rash)  penicillin (Unknown)  penicillin (Anaphylaxis)  Sulfur (Anaphylaxis)  sulfa drugs (Unknown)    Intolerances    VITALS & EXAMINATION:  Vital Signs Last 24 Hrs  T(C): 36.7 (2024 14:14), Max: 36.7 (2024 14:14)  T(F): 98 (2024 14:14), Max: 98 (2024 14:14)  HR: 72 (2024 14:14) (72 - 72)  BP: 193/77 (2024 14:14) (193/77 - 193/77)  RR: 16 (2024 14:14) (16 - 16)  SpO2: 98% (2024 14:14) (98% - 98%)    Parameters below as of 2024 14:14  Patient On (Oxygen Delivery Method): room air    General:  Constitutional: Female, appears stated age, in no apparent distress including pain  Head: Normocephalic & Atraumatic.  Respiratory: Breathing comfortably.  Extremities: No cyanosis, clubbing, or edema    Neurological:  MS: Eyes open, awake, alert, oriented to person, place, and situation, initially said May for month, then corrected to . Follows all commands.  IDs objects.    Language: Speech is clear, fluent with good repetition & comprehension.  CNs: PERRL (R = 3mm, L = 3mm). VFF. EOMI no nystagmus. V1-3 intact to LT b/l. No facial asymmetry b/l.    Motor: Normal muscle bulk & tone. No noticeable tremor. No UE drifts, b/l LE drifts.               Deltoid	Biceps	Triceps	   R	    5	     5	     5	 5	  		 	  L	    5	     5	     5	 5	  		    	H-Flex	K-Flex	K-Ext	D-Flex	P-Flex  R	    5	   5	   5	   5	    5		   L	    5	   5	    5	   5	    5		     Sensation: Intact to LT b/l throughout.   Cortical: Extinction on DSS (neglect): none  Reflexes:              Biceps(C5)       BR(C6)     Triceps(C7)               Patellar(L4)    Achilles(S1)    Plantar Resp  R	      2	            2	        2		              2                  2		      Down   L	      2	            2	        2		              2	          2		      Down     Coordination:  No dysmetria to FTN b/l.    Gait: Deferred.     LABORATORY:                          11.9   13.24 )-----------( 360      ( 2024 14:37 )             35.1     06-    128<L>  |  91<L>  |  16  ----------------------------<  164<H>  4.7   |  24  |  1.06    Ca    9.1      2024 14:37    TPro  7.2  /  Alb  3.9  /  TBili  0.3  /  DBili  x   /  AST  14  /  ALT  11  /  AlkPhos  48  06-09    PT/INR - ( 2024 14:37 )   PT: 10.5 sec;   INR: 0.94 ratio         PTT - ( 2024 14:37 )  PTT:30.8 sec       Radiology (XR, CT, MR, U/S, TTE/MARGARITA):    CTH: No CT evidence of acute intracranial hemorrhage, brain edema, or mass   effect.    CT PERFUSION:  Technical limitations: None.    Core infarction: 0 ml  Penumbra / tissue at risk for active ischemia: 0 ml    CTA NECK: No evidence of significant stenosis or occlusion. Multilevel vertebral degenerative changes as described above. Mild-to-moderate cervical stenosis at C4- C7.    CTA HEAD: Patent intracranial circulation without flow limiting stenosis. No evidence of aneurysm. Tiny aneurysms can be beyond the resolution of CTA technique.   HPI: 79 year old RH female with past medical history of SAH with IVH in 2015, DM, CAD, and HLD presents to the ED as a code stroke for an acute onset of speech difficulty.  Per family she was in her normal state of health today until about 1240 this afternoon she was eating breakfast and she suddenly wasn't able to speak. She said she was able to see and understand during the episode but was not able to speak, and her eyes remained open.  No shaking noted.   Episode lasted about 10 minutes total, and she is back at her baseline now. She had 1 week of nausea, vomiting, and headache, diarrhea, and dizziness, so she went to [facility] and was diagnosed with vertigo.  Also for the past few weeks has been seeing things that are not there- seeing mosquitos in the room and seeing her aide who was in another room. Per daughter at bedside she has also noticing a decline in memory for the past 2 weeks.   No AC/AP at baseline.      LKW: 12:40PM  NIHSS: 2  MRS: 3    A 10-system ROS was performed and is negative except for those items noted above and/or in the HPI.    PAST MEDICAL & SURGICAL HISTORY:  Abdominal obesity  Benign essential hypertension  CAD (coronary atherosclerotic disease)  s/p stent  DM type 2 (diabetes mellitus, type 2)  Dyslipidemia  H/O: hysterectomy  S/P  Section  S/P hemorrhoidectomy  History of cholecystectomy    FAMILY HISTORY:  No pertinent family history in first degree relatives      SOCIAL HISTORY:       MEDICATIONS (HOME):  Home Medications:  albuterol 2.5 mg/3 mL (0.083%) inhalation solution: 2.5 milligram(s) inhaled every 6 hours (2016 15:49)  atenolol 50 mg oral tablet: 1 tab(s) orally once a day (2016 15:49)  ferrous sulfate 325 mg (65 mg elemental iron) oral delayed release tablet: 1 tab(s) orally once a day (2016 15:49)  gabapentin 100 mg oral capsule: 1 cap(s) orally once a day (at bedtime) (2016 15:49)  glimepiride 4 mg oral tablet: 4 milligram(s) orally 2 times a day (2016 15:49)  lisinopril 10 mg oral tablet: 1 tab(s) orally once a day (2016 15:49)  metFORMIN 1000 mg oral tablet: 1 tab(s) orally 2 times a day (2016 15:49)  multivitamin: 1 tab(s) orally once a day (2016 15:49)  Norvasc 2.5 mg oral tablet: 1 tab(s) orally once a day (2016 15:49)  Os-Cristian: 1  orally once a day (2016 15:49)  pioglitazone 30 mg oral tablet: 1 tab(s) orally once a day (2016 15:49)  ProAir HFA 90 mcg/inh inhalation aerosol: 2 puff(s) inhaled 4 times a day, As Needed (2016 15:49)  simvastatin 40 mg oral tablet: 1 tab(s) orally once a day (at bedtime) (2016 15:49)  Tradjenta 5 mg oral tablet: 1 tab(s) orally once a day (2016 15:49)    MEDICATIONS  (STANDING):    MEDICATIONS  (PRN):    ALLERGIES/INTOLERANCES:  Allergies  penicillin (Rash)  sulfa drugs (Rash)  penicillin (Unknown)  penicillin (Anaphylaxis)  Sulfur (Anaphylaxis)  sulfa drugs (Unknown)    Intolerances    VITALS & EXAMINATION:  Vital Signs Last 24 Hrs  T(C): 36.7 (2024 14:14), Max: 36.7 (2024 14:14)  T(F): 98 (2024 14:14), Max: 98 (2024 14:14)  HR: 72 (2024 14:14) (72 - 72)  BP: 193/77 (2024 14:14) (193/77 - 193/77)  RR: 16 (2024 14:14) (16 - 16)  SpO2: 98% (2024 14:14) (98% - 98%)    Parameters below as of 2024 14:14  Patient On (Oxygen Delivery Method): room air    General:  Constitutional: Female, appears stated age, in no apparent distress including pain  Head: Normocephalic & Atraumatic.  Respiratory: Breathing comfortably.  Extremities: No cyanosis, clubbing, or edema    Neurological:  MS: Eyes open, awake, alert, oriented to person, place, and situation, initially said May for month, then corrected to . Follows all commands.  IDs objects.    Language: Speech is clear, fluent with good repetition & comprehension.  CNs: PERRL (R = 3mm, L = 3mm). VFF. EOMI no nystagmus. V1-3 intact to LT b/l. No facial asymmetry b/l.    Motor: Normal muscle bulk & tone. No noticeable tremor. No UE drifts, b/l LE drifts.               Deltoid	Biceps	Triceps	   R	    5	     5	     5	 5	  		 	  L	    5	     5	     5	 5	  		    	H-Flex	K-Flex	K-Ext	D-Flex	P-Flex  R	    5	   5	   5	   5	    5		   L	    5	   5	    5	   5	    5		     Sensation: Intact to LT b/l throughout.   Cortical: Extinction on DSS (neglect): none  Reflexes:              Biceps(C5)       BR(C6)     Triceps(C7)               Patellar(L4)    Achilles(S1)    Plantar Resp  R	      2	            2	        2		              2                  2		      Down   L	      2	            2	        2		              2	          2		      Down     Coordination:  No dysmetria to FTN b/l.    Gait: Seen ambulating without cane, mildly unstable     LABORATORY:                          11.9   13.24 )-----------( 360      ( 2024 14:37 )             35.1     06-09    128<L>  |  91<L>  |  16  ----------------------------<  164<H>  4.7   |  24  |  1.06    Ca    9.1      2024 14:37    TPro  7.2  /  Alb  3.9  /  TBili  0.3  /  DBili  x   /  AST  14  /  ALT  11  /  AlkPhos  48  06-09    PT/INR - ( 2024 14:37 )   PT: 10.5 sec;   INR: 0.94 ratio         PTT - ( 2024 14:37 )  PTT:30.8 sec       Radiology (XR, CT, MR, U/S, TTE/MARGARITA):    CTH: No CT evidence of acute intracranial hemorrhage, brain edema, or mass   effect.    CT PERFUSION:  Technical limitations: None.    Core infarction: 0 ml  Penumbra / tissue at risk for active ischemia: 0 ml    CTA NECK: No evidence of significant stenosis or occlusion. Multilevel vertebral degenerative changes as described above. Mild-to-moderate cervical stenosis at C4- C7.    CTA HEAD: Patent intracranial circulation without flow limiting stenosis. No evidence of aneurysm. Tiny aneurysms can be beyond the resolution of CTA technique.

## 2024-06-09 NOTE — ED PROVIDER NOTE - PROGRESS NOTE DETAILS
Judah Baca MD, PGY2  Discussed with neurology no acute intervention or need for inpatient imaging at this time.  Recommending following up with neurology clinic for outpatient MRI and EEG.  Patient currently asymptomatic.  Minor elevation in white blood cell count of 13 but improved from 1 week prior and hyponatremia to 128 also improved from 1 week prior.  Recommending starting aspirin 81 mg daily.

## 2024-06-09 NOTE — CONSULT NOTE ADULT - ASSESSMENT
79 year old RH female with past medical history of SAH with IVH in 2015, DM, CAD, and HLD presents to the ED as a code stroke for an acute onset of speech difficulty.  Per family she was in her normal state of health today until about 1240 this afternoon she was eating breakfast and she suddenly wasn't able to speak. She said she was able to see and understand during the episode but was not able to speak, and her eyes remained open.  No shaking noted.   Episode lasted about 10 minutes total, and she is back at her baseline now. She had 1 week of nausea, vomiting, and headache, diarrhea, and dizziness, so she went to [facility] and was diagnosed with vertigo.  Also for the past few weeks has been seeing things that are not there- seeing mosquitos in the room and seeing her aide who was in another room.  No AC/AP at baseline.      LKW: 6/9/24 at 1240   NIHSS:  3  Baseline MRS: 3  Not a tenecteplase candidate due to symptoms now resolved, back at baseline, history of IVH.    Not a thrombectomy candidate due to no LVO on CTA.      Impression:  Transient episode of speech disturbance due to left brain dysfunction due to TIA vs focal seizure.  Pending further workup.      INCOMPLETE   Recommendations:  [] Aspirin 81mg daily   [] Atorvastatin 80 mg daily titrated per LDL < 70  [] Please send: HgbA1C, fasting lipid panel, CBC, CMP, coag panel, troponin  [] MRI brain w/o con  [] TTE   [] telemetry to check for arrhythmia  [] Baseline EKG  [] Tight glucose control (long-term goal HgbA1c < 6%)  [] Stroke education and counseling  [] Neuro-checks and VS q4h  [] Permissive HTN up to 220/120 for 24-48h from symptom onset  [] Dysphagia screen. If fails, speech/swallow eval  [] aspiration and fall precautions  [] STAT CT head non-contrast for change in neuro exam.   [] PT/ OT   [] DVT ppx per primary team     Discussed with stroke fellow       and under supervision of attending       regarding decision against candidacy for tenecteplase/ thrombectomy. Will be formally staffed on morning rounds with attending. Recommendations will be complete once signed by attending. 79 year old RH female with past medical history of SAH with IVH in 2015, DM, CAD, and HLD presents to the ED as a code stroke for an acute onset of speech difficulty.  Per family she was in her normal state of health today until about 1240 this afternoon she was eating breakfast and she suddenly wasn't able to speak. She said she was able to see and understand during the episode but was not able to speak, and her eyes remained open.  No shaking noted.   Episode lasted about 10 minutes total, and she is back at her baseline now. She had 1 week of nausea, vomiting, and headache, diarrhea, and dizziness, so she went to [facility] and was diagnosed with vertigo.  Also for the past few weeks has been seeing things that are not there- seeing mosquitos in the room and seeing her aide who was in another room.  No AC/AP at baseline.      LKW: 6/9/24 at 1240   NIHSS:  2  Baseline MRS: 3  Not a tenecteplase candidate due to symptoms now resolved, back at baseline, history of IVH.    Not a thrombectomy candidate due to no LVO on CTA.      Impression:  Transient episode of speech disturbance due to left brain dysfunction due to TIA vs focal seizure.  Pending further workup.      INCOMPLETE   Recommendations:  [] Aspirin 81mg daily   [] Atorvastatin 80 mg daily titrated per LDL < 70  [] Please send: HgbA1C, fasting lipid panel, CBC, CMP, coag panel, troponin  [] MRI brain w/o con  [] vEEG   [] TTE   [] telemetry to check for arrhythmia  [] Baseline EKG  [] Tight glucose control (long-term goal HgbA1c < 6%)  [] Stroke education and counseling  [] Neuro-checks and VS q4h  [] Permissive HTN up to 220/120 for 24-48h from symptom onset  [] Dysphagia screen. If fails, speech/swallow eval  [] aspiration and fall precautions  [] STAT CT head non-contrast for change in neuro exam.   [] PT/ OT   [] DVT ppx per primary team     Discussed with stroke attending Dr. Siddiqi regarding decision against candidacy for tenecteplase/ thrombectomy. Will be formally staffed on morning rounds with attending. Recommendations will be complete once signed by attending. 79 year old RH female with past medical history of SAH with IVH in 2015, DM, CAD, and HLD presents to the ED as a code stroke for an acute onset of speech difficulty.  Per family she was in her normal state of health today until about 1240 this afternoon she was eating breakfast and she suddenly wasn't able to speak. She said she was able to see and understand during the episode but was not able to speak, and her eyes remained open.  No shaking noted.   Episode lasted about 10 minutes total, and she is back at her baseline now. She had 1 week of nausea, vomiting, and headache, diarrhea, and dizziness, so she went to [facility] and was diagnosed with vertigo.  Also for the past few weeks has been seeing things that are not there- seeing mosquitos in the room and seeing her aide who was in another room.  No AC/AP at baseline.      LKW: 6/9/24 at 1240   NIHSS:  2 (1 each for lower extremity drift, which is at baseline)  ABCD2=5  Baseline MRS: 3  Not a tenecteplase candidate due to symptoms now resolved, back at baseline, history of IVH.    Not a thrombectomy candidate due to no LVO on CTA.      Impression:    Transient episode of speech disturbance due to left brain dysfunction due to TIA vs focal seizure.   May also have component of metabolic/infectious abnormality     Recommendations:  [] Start Aspirin 81mg daily (will avoid DAPT at this time due to hx of SAH)  [] Atorvastatin 80 mg daily titrated per LDL < 70  [] Please send: HgbA1C, fasting lipid panel  [] MRI brain w/ and w/o con - can be done outpatient   [] routine EEG - can be done outpatient   [] TTE - can be done outpatient   [] EKG, telemetry  [] Tight glucose control (long-term goal HgbA1c < 6%)  [] BP goal: Normotension. Management of HTN per ED/primary team   [] fall precautions. PT/ OT as needed if being admitted   [] Management of hyponatremia and toxic/metabolic workup given leukocytosis per ED  [] Follow up with Neurology as outpatient. Patient can follow up with Dr. Reyes Holly after discharge. Please instruct the patient/family to call [phone] to schedule an appointment within the next 1-2 weeks. Office is located at [address] [place] [address], [place] [address].    Discussed with stroke attending Dr. Siddiqi regarding decision against candidacy for tenecteplase/ thrombectomy. Recommendations will be complete once signed by attending. Information: Selecting Yes will display possible errors in your note based on the variables you have selected. This validation is only offered as a suggestion for you. PLEASE NOTE THAT THE VALIDATION TEXT WILL BE REMOVED WHEN YOU FINALIZE YOUR NOTE. IF YOU WANT TO FAX A PRELIMINARY NOTE YOU WILL NEED TO TOGGLE THIS TO 'NO' IF YOU DO NOT WANT IT IN YOUR FAXED NOTE.

## 2024-06-09 NOTE — ED PROVIDER NOTE - NSFOLLOWUPINSTRUCTIONS_ED_ALL_ED_FT
Please start taking aspirin 81mg daily.    Follow up with Neurology as outpatient. you can follow up with Dr. Reyes Holly after discharge. Please call [phone] to schedule an appointment within the next 1-2 weeks. Office is located at [address] [facility], [place] [address].    Transient Ischemic Attack  A transient ischemic attack (TIA) causes the same symptoms as a stroke, but the symptoms go away quickly. A TIA happens when blood flow to the brain is blocked. Having a TIA means you may be at risk for a stroke. A TIA is a medical emergency.    What are the causes?  A TIA is caused by a blocked artery in the head or neck. This means the brain does not get the blood supply it needs. A blockage can be caused by:  Fatty buildup in an artery in the head or neck.  A blood clot.  A tear in an artery.  Irritation and swelling (inflammation) of an artery.  Sometimes the cause is not known.    What increases the risk?  Certain things may make you more likely to have a TIA. Some of these are things that you can change, such as:  Using products that have nicotine or tobacco.  Not being active.  Drinking too much alcohol.  Using recreational drugs.  Health conditions that may increase your risk include:  High blood pressure.  High cholesterol.  Diabetes.  Heart disease.  A heartbeat that is not regular (atrial fibrillation).  Sickle cell disease.  Problems with blood clotting.  Other risk factors include:  Being over the age of 60.  Being male.  Being very overweight.  Sleep problems (sleep apnea).  Having a family history of stroke.  Having had blood clots, stroke, TIA, or heart attack in the past.  What are the signs or symptoms?  A normal face next to a face that is drooping on one side due to weakness from transient ischemic attack.  The symptoms of a TIA are like those of a stroke. They can include:  Weakness or loss of feeling in your face, arm, or leg. This often happens on one side of your body.  Trouble walking.  Trouble moving your arms or legs.  Trouble talking or understanding what people are saying.  Problems with how you see.  Feeling dizzy.  Feeling confused.  Loss of balance or coordination.  Feeling like you may vomit (nausea) or vomiting.  Having a very bad headache.  If you can, note what time you started to have symptoms. Tell your doctor.    How is this treated?  The goal of treatment is to lower the risk for a stroke. This may include:  Changes to diet and lifestyle, such as getting regular exercise and stopping smoking.  Taking medicines to:  Thin the blood.  Lower blood pressure.  Lower cholesterol.  Treating other health conditions, such as diabetes.  If testing shows that an artery in your brain is narrow, your doctor may recommend a procedure to:  Take the blockage out of your artery.  Open or widen an artery in your neck (carotid angioplasty and stenting).  Follow these instructions at home:  Medicines    Take over-the-counter and prescription medicines only as told by your doctor.  If you were told to take aspirin or another medicine to thin your blood, use it exactly as told by your doctor.  Taking too much of the medicine can cause bleeding.  Taking too little of the medicine may not work to treat the problem.  Eating and drinking    A plate with examples of foods in a healthy diet.  Eat 5 or more servings of fruits and vegetables each day.  Follow instructions from your doctor about your diet. You may need to follow a certain diet to help lower your risk of a stroke. You may need to:  Eat a diet that is low in fat and salt.  Eat foods with a lot of fiber.  Limit carbohydrates and sugar.  If you drink alcohol:  Limit how much you have to:  0–1 drink a day for women who are not pregnant.  0–2 drinks a day for men.  Know how much alcohol is in a drink. In the [place] one drink equals one 12 oz bottle of beer (355 mL), one 5 oz glass of wine (148 mL), or one 1½ oz glass of hard liquor (44 mL).  General instructions    Keep a healthy weight.  Try to get at least 30 minutes of exercise on most days.  Get treatment if you have sleep problems.  Do not smoke or use any products that contain nicotine or tobacco. If you need help quitting, ask your doctor.  Do not use drugs.  Keep all follow-up visits. Your doctor will want to know if you have any more symptoms and to check blood labs if any medicines were prescribed.  Where to find more information  American Stroke Association: stroke.org  Get help right away if:  You have chest pain.  You have a heartbeat that is not regular.  You have any signs of a stroke. "BE FAST" is an easy way to remember the main warning signs:  B - Balance. Dizziness, sudden trouble walking, or loss of balance.  E - Eyes. Trouble seeing or a change in how you see.  F - Face. Sudden weakness or loss of feeling of the face. The face or eyelid may droop on one side.  A - Arms. Weakness or loss of feeling in an arm. This happens all of a sudden and most often on one side of the body.  S - Speech. Sudden trouble speaking, slurred speech, or trouble understanding what people say.  T - Time. Time to call emergency services. Write down what time symptoms started.  You have other signs of a stroke, such as:  A sudden, very bad headache with no known cause.  Feeling like you may vomit.  Vomiting.  A seizure.  These symptoms may be an emergency. Get help right away. Call 911.  Do not wait to see if the symptoms will go away.  Do not drive yourself to the hospital.  This information is not intended to replace advice given to you by your health care provider. Make sure you discuss any questions you have with your health care provider.

## 2024-06-09 NOTE — ED ADULT NURSE NOTE - NSFALLUNIVINTERV_ED_ALL_ED
Bed/Stretcher in lowest position, wheels locked, appropriate side rails in place/Call bell, personal items and telephone in reach/Instruct patient to call for assistance before getting out of bed/chair/stretcher/Non-slip footwear applied when patient is off stretcher/[place] to call system/Physically safe environment - no spills, clutter or unnecessary equipment/Purposeful proactive rounding/Room/bathroom lighting operational, light cord in reach

## 2024-06-09 NOTE — ED ADULT TRIAGE NOTE - CHIEF COMPLAINT QUOTE
finger stick 172.  at 12 40 pm - pt unresponsive- lasted 10 min. noted by family ,ems. presently alert ,oriented x3.   no slurred speech,  no facial droop. equal strength bilaterally

## 2024-06-09 NOTE — ED PROVIDER NOTE - CLINICAL SUMMARY MEDICAL DECISION MAKING FREE TEXT BOX
Judah Baca MD, PGY2  79-year-old female with past medical history of type 2 diabetes, CAD, hyperlipidemia, hypertension, subarachnoid hemorrhage in 2015 presenting to the emergency department after episode at 12:40 PM today in which she felt she was unable to speak for 10 minutes.  Was with family and was trying to respond but states she was unable to.  Eyes remained open and patient never lost consciousness.  No body shaking or urinary/bowel incontinence.  Has not had similar symptoms to this before in the past.  Symptoms resolved after 10 minutes.  Currently at baseline mentally.  Currently just endorsing generalized weakness worse in bilateral legs. Denies fever, headache, vision change, chest pain, shortness of breath, cough, abdominal pain, nausea, vomiting, diarrhea, extremity edema, rash.    In the emergency department code stroke called.  Patient hypertensive to 190s over 70s, otherwise hemodynamically stable and afebrile.  Patient well-appearing in no acute distress.  No focal neurologic findings on examination.  Differential including but not limited to TIA/CVA, metabolic abnormalities, electrolyte abnormalities, infectious etiologies.  Plan to obtain labs, CT imaging, urine and reassess.  Disposition pending workup and neurology evaluation/recommendations.

## 2024-08-06 ENCOUNTER — APPOINTMENT (OUTPATIENT)
Dept: VASCULAR SURGERY | Facility: CLINIC | Age: 79
End: 2024-08-06

## 2024-08-19 ENCOUNTER — INPATIENT (INPATIENT)
Facility: HOSPITAL | Age: 79
LOS: 13 days | Discharge: NURSING, REHAB OR LONG-TERM CARE | End: 2024-09-02
Attending: INTERNAL MEDICINE | Admitting: INTERNAL MEDICINE
Payer: MEDICARE

## 2024-08-19 VITALS
DIASTOLIC BLOOD PRESSURE: 106 MMHG | HEART RATE: 68 BPM | OXYGEN SATURATION: 99 % | SYSTOLIC BLOOD PRESSURE: 192 MMHG | WEIGHT: 160.06 LBS | RESPIRATION RATE: 18 BRPM | TEMPERATURE: 98 F

## 2024-08-19 DIAGNOSIS — M25.552 PAIN IN LEFT HIP: ICD-10-CM

## 2024-08-19 DIAGNOSIS — Z90.49 ACQUIRED ABSENCE OF OTHER SPECIFIED PARTS OF DIGESTIVE TRACT: Chronic | ICD-10-CM

## 2024-08-19 LAB
ALBUMIN SERPL ELPH-MCNC: 4.2 G/DL — SIGNIFICANT CHANGE UP (ref 3.3–5)
ALP SERPL-CCNC: 51 U/L — SIGNIFICANT CHANGE UP (ref 40–120)
ALT FLD-CCNC: 9 U/L — SIGNIFICANT CHANGE UP (ref 4–33)
ANION GAP SERPL CALC-SCNC: 14 MMOL/L — SIGNIFICANT CHANGE UP (ref 7–14)
APPEARANCE UR: CLEAR — SIGNIFICANT CHANGE UP
AST SERPL-CCNC: 11 U/L — SIGNIFICANT CHANGE UP (ref 4–32)
BACTERIA # UR AUTO: ABNORMAL /HPF
BASOPHILS # BLD AUTO: 0.05 K/UL — SIGNIFICANT CHANGE UP (ref 0–0.2)
BASOPHILS NFR BLD AUTO: 0.5 % — SIGNIFICANT CHANGE UP (ref 0–2)
BILIRUB SERPL-MCNC: 0.3 MG/DL — SIGNIFICANT CHANGE UP (ref 0.2–1.2)
BILIRUB UR-MCNC: NEGATIVE — SIGNIFICANT CHANGE UP
BUN SERPL-MCNC: 17 MG/DL — SIGNIFICANT CHANGE UP (ref 7–23)
CALCIUM SERPL-MCNC: 9.6 MG/DL — SIGNIFICANT CHANGE UP (ref 8.4–10.5)
CAST: 0 /LPF — SIGNIFICANT CHANGE UP (ref 0–4)
CHLORIDE SERPL-SCNC: 89 MMOL/L — LOW (ref 98–107)
CO2 SERPL-SCNC: 22 MMOL/L — SIGNIFICANT CHANGE UP (ref 22–31)
COLOR SPEC: YELLOW — SIGNIFICANT CHANGE UP
CREAT SERPL-MCNC: 0.9 MG/DL — SIGNIFICANT CHANGE UP (ref 0.5–1.3)
DIFF PNL FLD: NEGATIVE — SIGNIFICANT CHANGE UP
EGFR: 65 ML/MIN/1.73M2 — SIGNIFICANT CHANGE UP
EOSINOPHIL # BLD AUTO: 0.09 K/UL — SIGNIFICANT CHANGE UP (ref 0–0.5)
EOSINOPHIL NFR BLD AUTO: 0.8 % — SIGNIFICANT CHANGE UP (ref 0–6)
GLUCOSE BLDC GLUCOMTR-MCNC: 118 MG/DL — HIGH (ref 70–99)
GLUCOSE BLDC GLUCOMTR-MCNC: 182 MG/DL — HIGH (ref 70–99)
GLUCOSE SERPL-MCNC: 116 MG/DL — HIGH (ref 70–99)
GLUCOSE UR QL: NEGATIVE MG/DL — SIGNIFICANT CHANGE UP
HCT VFR BLD CALC: 35.2 % — SIGNIFICANT CHANGE UP (ref 34.5–45)
HGB BLD-MCNC: 11.4 G/DL — LOW (ref 11.5–15.5)
IANC: 8.16 K/UL — HIGH (ref 1.8–7.4)
IMM GRANULOCYTES NFR BLD AUTO: 0.7 % — SIGNIFICANT CHANGE UP (ref 0–0.9)
KETONES UR-MCNC: NEGATIVE MG/DL — SIGNIFICANT CHANGE UP
LEUKOCYTE ESTERASE UR-ACNC: ABNORMAL
LYMPHOCYTES # BLD AUTO: 1.62 K/UL — SIGNIFICANT CHANGE UP (ref 1–3.3)
LYMPHOCYTES # BLD AUTO: 15.1 % — SIGNIFICANT CHANGE UP (ref 13–44)
MAGNESIUM SERPL-MCNC: 1.3 MG/DL — LOW (ref 1.6–2.6)
MCHC RBC-ENTMCNC: 27.9 PG — SIGNIFICANT CHANGE UP (ref 27–34)
MCHC RBC-ENTMCNC: 32.4 GM/DL — SIGNIFICANT CHANGE UP (ref 32–36)
MCV RBC AUTO: 86.1 FL — SIGNIFICANT CHANGE UP (ref 80–100)
MONOCYTES # BLD AUTO: 0.72 K/UL — SIGNIFICANT CHANGE UP (ref 0–0.9)
MONOCYTES NFR BLD AUTO: 6.7 % — SIGNIFICANT CHANGE UP (ref 2–14)
NEUTROPHILS # BLD AUTO: 8.16 K/UL — HIGH (ref 1.8–7.4)
NEUTROPHILS NFR BLD AUTO: 76.2 % — SIGNIFICANT CHANGE UP (ref 43–77)
NITRITE UR-MCNC: NEGATIVE — SIGNIFICANT CHANGE UP
NRBC # BLD: 0 /100 WBCS — SIGNIFICANT CHANGE UP (ref 0–0)
NRBC # FLD: 0 K/UL — SIGNIFICANT CHANGE UP (ref 0–0)
PH UR: 7 — SIGNIFICANT CHANGE UP (ref 5–8)
PLATELET # BLD AUTO: 383 K/UL — SIGNIFICANT CHANGE UP (ref 150–400)
POTASSIUM SERPL-MCNC: 4.4 MMOL/L — SIGNIFICANT CHANGE UP (ref 3.5–5.3)
POTASSIUM SERPL-SCNC: 4.4 MMOL/L — SIGNIFICANT CHANGE UP (ref 3.5–5.3)
PROT SERPL-MCNC: 7.5 G/DL — SIGNIFICANT CHANGE UP (ref 6–8.3)
PROT UR-MCNC: 30 MG/DL
RBC # BLD: 4.09 M/UL — SIGNIFICANT CHANGE UP (ref 3.8–5.2)
RBC # FLD: 12.7 % — SIGNIFICANT CHANGE UP (ref 10.3–14.5)
RBC CASTS # UR COMP ASSIST: 1 /HPF — SIGNIFICANT CHANGE UP (ref 0–4)
SODIUM SERPL-SCNC: 125 MMOL/L — LOW (ref 135–145)
SP GR SPEC: 1.02 — SIGNIFICANT CHANGE UP (ref 1–1.03)
SQUAMOUS # UR AUTO: 4 /HPF — SIGNIFICANT CHANGE UP (ref 0–5)
UROBILINOGEN FLD QL: 0.2 MG/DL — SIGNIFICANT CHANGE UP (ref 0.2–1)
WBC # BLD: 10.71 K/UL — HIGH (ref 3.8–10.5)
WBC # FLD AUTO: 10.71 K/UL — HIGH (ref 3.8–10.5)
WBC UR QL: 5 /HPF — SIGNIFICANT CHANGE UP (ref 0–5)

## 2024-08-19 PROCEDURE — 99223 1ST HOSP IP/OBS HIGH 75: CPT

## 2024-08-19 PROCEDURE — 99285 EMERGENCY DEPT VISIT HI MDM: CPT

## 2024-08-19 PROCEDURE — 74177 CT ABD & PELVIS W/CONTRAST: CPT | Mod: 26,MC

## 2024-08-19 RX ORDER — DEXTROSE 15 G/33 G
25 GEL IN PACKET (GRAM) ORAL ONCE
Refills: 0 | Status: DISCONTINUED | OUTPATIENT
Start: 2024-08-19 | End: 2024-09-02

## 2024-08-19 RX ORDER — ACETAMINOPHEN 325 MG/1
1000 TABLET ORAL ONCE
Refills: 0 | Status: COMPLETED | OUTPATIENT
Start: 2024-08-19 | End: 2024-08-19

## 2024-08-19 RX ORDER — LIDOCAINE/BENZALKONIUM/ALCOHOL
1 SOLUTION, NON-ORAL TOPICAL ONCE
Refills: 0 | Status: COMPLETED | OUTPATIENT
Start: 2024-08-19 | End: 2024-08-19

## 2024-08-19 RX ORDER — GLUCAGON INJECTION, SOLUTION 1 MG/.2ML
1 INJECTION, SOLUTION SUBCUTANEOUS ONCE
Refills: 0 | Status: DISCONTINUED | OUTPATIENT
Start: 2024-08-19 | End: 2024-09-02

## 2024-08-19 RX ORDER — SODIUM CHLORIDE 9 MG/ML
1000 INJECTION INTRAMUSCULAR; INTRAVENOUS; SUBCUTANEOUS ONCE
Refills: 0 | Status: COMPLETED | OUTPATIENT
Start: 2024-08-19 | End: 2024-08-19

## 2024-08-19 RX ORDER — DEXTROSE 15 G/33 G
15 GEL IN PACKET (GRAM) ORAL ONCE
Refills: 0 | Status: DISCONTINUED | OUTPATIENT
Start: 2024-08-19 | End: 2024-09-02

## 2024-08-19 RX ORDER — DEXTROSE 15 G/33 G
12.5 GEL IN PACKET (GRAM) ORAL ONCE
Refills: 0 | Status: DISCONTINUED | OUTPATIENT
Start: 2024-08-19 | End: 2024-09-02

## 2024-08-19 RX ORDER — AMLODIPINE BESYLATE 10 MG/1
10 TABLET ORAL ONCE
Refills: 0 | Status: COMPLETED | OUTPATIENT
Start: 2024-08-19 | End: 2024-08-19

## 2024-08-19 RX ORDER — ACETAMINOPHEN 325 MG/1
650 TABLET ORAL EVERY 6 HOURS
Refills: 0 | Status: DISCONTINUED | OUTPATIENT
Start: 2024-08-19 | End: 2024-08-24

## 2024-08-19 RX ADMIN — ACETAMINOPHEN 400 MILLIGRAM(S): 325 TABLET ORAL at 13:45

## 2024-08-19 RX ADMIN — SODIUM CHLORIDE 1000 MILLILITER(S): 9 INJECTION INTRAMUSCULAR; INTRAVENOUS; SUBCUTANEOUS at 13:45

## 2024-08-19 RX ADMIN — ACETAMINOPHEN 650 MILLIGRAM(S): 325 TABLET ORAL at 22:50

## 2024-08-19 RX ADMIN — Medication 1 PATCH: at 13:45

## 2024-08-19 RX ADMIN — AMLODIPINE BESYLATE 10 MILLIGRAM(S): 10 TABLET ORAL at 14:14

## 2024-08-19 RX ADMIN — ACETAMINOPHEN 1000 MILLIGRAM(S): 325 TABLET ORAL at 15:22

## 2024-08-19 RX ADMIN — ACETAMINOPHEN 650 MILLIGRAM(S): 325 TABLET ORAL at 23:50

## 2024-08-19 NOTE — ED PROVIDER NOTE - ATTENDING CONTRIBUTION TO CARE
Agree with above, patient with generalized fatigue, left hip pain for the last few weeks since fall as well as some right-sided abdominal pain denies any fevers, dysuria, nausea, vomiting.  Plan for labs, CT abdomen pelvis, CT head rule out fracture, likely admit given hyponatremia

## 2024-08-19 NOTE — ED PROVIDER NOTE - CLINICAL SUMMARY MEDICAL DECISION MAKING FREE TEXT BOX
79-year-old female with PMH TIA 1 and half months ago, DM2, HTN, HLD presents for a fall.  Patient was going to see an outpatient neurologist today for follow-up from her TIA 1-1/2 months ago and fell on the way walking outside the door.  Earlier this morning around 8 AM patient noticed that her systolic blood pressure was 185 and patient took her home medications.  Patient states that she takes 100 mg of some medication but she does not know the name.  Later when patient was going over a step she had left hip pain that caused her to fall down slowly that was witnessed by the daughter at bedside.  Patient also was being helped by an aide at the time.  Patient states that for the past 3 weeks she has had left hip pain and had a fall 3 weeks ago but was not evaluated.  Patient has been using a cane to ambulate due to the pain however she has normally been using a cane to ambulate for the past few months now.  Patient was noted to slowly lower herself to the ground and landed on her knees.  Denies head strike, LOC.  Patient is on a baby aspirin every day.  Denies pain elsewhere.  Denies recent fever, chills, nausea, vomiting, constipation, diarrhea, shortness of breath, chest pain, dysuria.    Gen: well appearing, NAD  HEENT: no conjunctivitis  Cardiac: regular rate rhythm, normal S1S2  Chest: CTA BL, no wheeze or crackles  Abdomen: obese, normal BS, soft, +RLQ and umbilical tenderness to palpation  MSK: pelvis stable, LLE hip extension limited 2/2 pain, 5/5 strenght to dorsiflexion and plantarflexion b/l  Extremity: no gross deformity, good perfusion  Skin: no rash  Neuro: grossly normal    Will obtain labs, CT abdomen pelvis with IV contrast, CT bony pelvis to evaluate for occult fracture, pain control, fluids.  Dispo pending results and clinical course.

## 2024-08-19 NOTE — ED ADULT NURSE NOTE - NSFALLRISKINTERV_ED_ALL_ED

## 2024-08-19 NOTE — ED PROVIDER NOTE - PROGRESS NOTE DETAILS
Patient seen by nephro, hyponatremia noted, requesting admission to Dr. Murphy for further evaluation

## 2024-08-19 NOTE — H&P ADULT - NSHPLABSRESULTS_GEN_ALL_CORE
11.4   10.71 )-----------( 383      ( 19 Aug 2024 13:00 )             35.2     125<L>  |  89<L>  |  17  ----------------------------<  116<H>       4.4   |  22  |  0.90    Ca    9.6      19 Aug 2024 13:00  Mg     1.30         TPro  7.5  /  Alb  4.2  /  TBili  0.3  /  DBili  x   /  AST  11  /  ALT  9   /  AlkPhos  51                              Urinalysis Basic - ( 19 Aug 2024 17:27 )  Color: Yellow / Appearance: Clear / S.016 / pH: 7.0  Gluc: Negative mg/dL / Ketone: Negative mg/dL  / Bili: Negative / Urobili: 0.2 mg/dL   Blood: Negative / Protein: 30 mg/dL / Nitrite: Negative   Leuk Esterase: Trace / RBC: 1 /HPF / WBC 5 /HPF   Sq Epi: 4 /HPF / Bacteria: Occasional /HPF  Hyaline Casts: x/WBC Casts: x    < from: CT Abdomen and Pelvis w/ IV Cont (24 @ 15:40) >    Etiology of the abdominal pain is not elucidated on this exam.    Mild biliary and main pancreatic duct dilatation, slightly increased   compared to prior 2020. Consider further evaluation with MRI/MRCP.   No discrete mass identified on this exam.    Chronic appearing superior endplate compression deformity of the L5   vertebral body, however new compared to prior 2020.    < end of copied text >          Urinalysis with Rflx Culture (collected 19 Aug 2024 17:27)

## 2024-08-19 NOTE — H&P ADULT - PROBLEM SELECTOR PLAN 5
- Continue home metoprolol  - unclear if pt is taking a second agent.  Will add amlodipine for now  - confirm home meds (See below)

## 2024-08-19 NOTE — H&P ADULT - TIME BILLING
Review of prior inpatient and outpatient records, review of results of current studies, taking history from patient, evaluation with exam, reconciliation of medications, entering appropriate orders, and documentation.

## 2024-08-19 NOTE — CONSULT NOTE ADULT - NS ATTEND AMEND GEN_ALL_CORE FT
as above check urine na urine os  pending repeat BMP   IF SODIUM remains low 125 or less--> start salt tab 1 gm TID

## 2024-08-19 NOTE — CONSULT NOTE ADULT - SUBJECTIVE AND OBJECTIVE BOX
[facility]   MD MAURIZIO CLARK MD ANGELA WONG, PA        TEL:  OFFICE: [phone]  From 5pm-7am answering service [phone]    --- INITIAL RENAL CONSULT NOTE ---date of service 24 @ 15:45    HPI:  79-year-old female with PMH TIA 1 and half months ago, DM2, HTN, HLD presents for a fall.  per daughter patient has increasing weakness over past month and half. has plans to go see outpatient neurologist. BP also noted to be elevated in the am. pt report fall over the steps sec to left hip pain witnessed by daughter. nephrology consulted for hyponatremia  pt seen by dr. anand in  for hyponatremia work up suggested polydipsia with elevated glucose.      Allergies:  penicillin (Rash)  sulfa drugs (Rash)  Sulfur (Anaphylaxis)  penicillin (Unknown)  penicillin (Anaphylaxis)  sulfa drugs (Unknown)      PAST MEDICAL & SURGICAL HISTORY:  Abdominal obesity      Benign essential hypertension      CAD (coronary atherosclerotic disease)  s/p stent      DM type 2 (diabetes mellitus, type 2)      Dyslipidemia      H/O: hysterectomy      S/P  Section      S/P hemorrhoidectomy      History of cholecystectomy          Home Medications Reviewed    Hospital Medications:   MEDICATIONS  (STANDING):      SOCIAL HISTORY:  Denies ETOh, Smoking,     FAMILY HISTORY:  No pertinent family history in first degree relatives        REVIEW OF SYSTEMS:  CONSTITUTIONAL: + weakness,no  fevers or chills  EYES/ENT: No visual changes;  No vertigo or throat pain   NECK: No pain or stiffness  RESPIRATORY: No cough, wheezing, hemoptysis; No shortness of breath  CARDIOVASCULAR: No chest pain or palpitations.  GASTROINTESTINAL: No abdominal or epigastric pain. No nausea, vomiting, or hematemesis; No diarrhea or constipation. No melena or hematochezia.  GENITOURINARY: No dysuria, frequency, foamy urine, urinary urgency, incontinence or hematuria  NEUROLOGICAL: No numbness or weakness  SKIN: No itching, burning, rashes, or lesions   VASCULAR: No bilateral lower extremity edema.   All other review of systems is negative unless indicated above.    VITALS:  T(F): 98.5 (24 @ 15:16), Max: 98.5 (24 @ 15:16)  HR: 69 (24 @ 15:16)  BP: 167/70 (24 @ 15:16)  RR: 18 (24 @ 15:16)  SpO2: 100% (24 @ 15:16)  Wt(kg): --      Weight (kg): 72.6 ( @ 10:02)    PHYSICAL EXAM:  General: NAD  HEENT: anicteric sclera, oropharynx clear, MMM  Neck: No JVD  Respiratory: CTAB, no wheezes, rales or rhonchi  Cardiovascular: S1, S2, RRR  Gastrointestinal: BS+, soft, NT/ND  Extremities: No cyanosis or clubbing. No peripheral edema  Neurological: A/O x 3, no focal deficits  Psychiatric: Normal mood, normal affect  : No CVA tenderness. No olivo.   Skin: No rashes  Vascular Access: none    LABS:      125<L>  |  89<L>  |  17  ----------------------------<  116<H>  4.4   |  22  |  0.90    Ca    9.6      19 Aug 2024 13:00  Mg     1.30         TPro  7.5  /  Alb  4.2  /  TBili  0.3  /  DBili      /  AST  11  /  ALT  9   /  AlkPhos  51      Creatinine Trend: 0.90 <--                        11.4   10.71 )-----------( 383      ( 19 Aug 2024 13:00 )             35.2     Urine Studies:  Urinalysis Basic - ( 19 Aug 2024 13:00 )    Color:  / Appearance:  / SG:  / pH:   Gluc: 116 mg/dL / Ketone:   / Bili:  / Urobili:    Blood:  / Protein:  / Nitrite:    Leuk Esterase:  / RBC:  / WBC    Sq Epi:  / Non Sq Epi:  / Bacteria:           RADIOLOGY & ADDITIONAL STUDIES:

## 2024-08-19 NOTE — ED ADULT NURSE NOTE - OBJECTIVE STATEMENT
Pt is in spot 3 in the ED. Pt has a hx of HTN and diabetes. Pt is A&Ox3 and ambulates with a cane at baseline. Pt is c/o left hip pain that radiates down her leg x 3 weeks since she fell onto her hip. Pt has been using a wheelchair since the fall. Pt states she fell because she got dizzy. Pt reports a mechanical fall today because she tripped and missed a step. Pt also complains of a HA and midsternal CP x 1 day. Pt reports taking Tylenol at 7AM today. Pt reports left foot tingling. Respirations are equal, clear and unlabored bilaterally. Pt is in NSR on the cardiac monitor. Abdomen is soft and non distended. Skin is intact. Left inner groin pain on palpation. No bruising or wounds noted to left hip. Denies N/V/D, blood thinner use, SOB, weakness, LOC, head strike. Pt is in spot 3 in the ED. Pt has a hx of HTN and diabetes. Pt is A&Ox3 and ambulates with a cane at baseline. Pt is c/o left hip pain that radiates down her leg x 3 weeks since she fell onto her hip. Pt has been using a wheelchair since the fall. Pt states she fell because she got dizzy. Pt reports a mechanical fall today because she tripped and missed a step. Pt also complains of a HA and midsternal CP x 1 day. Pt reports taking Tylenol at 7AM today. Pt reports left foot tingling. Respirations are equal, clear and unlabored bilaterally. Pt is in NSR on the cardiac monitor. Abdomen is soft and non distended. Skin is intact. Left inner groin pain on palpation. No bruising or wounds noted to left hip. Denies N/V/D, blood thinner use, SOB, weakness, LOC, head strike, dysuria, urinary frequency and urgency. Pt is in spot 3 in the ED. Pt has a hx of HTN and diabetes. Pt is A&Ox3 and ambulates with a cane at baseline. Pt is c/o left hip pain that radiates down her leg x 3 weeks since she fell onto her hip. Pt has been using a wheelchair since the fall. Pt states she fell because she got dizzy. Pt reports a mechanical fall today because she tripped and missed a step. Pt also complains of a HA and midsternal CP x 1 day. Pt reports taking Tylenol at 7AM today. Pt reports left foot tingling. Respirations are equal, clear and unlabored bilaterally. Pt is in NSR on the cardiac monitor. Abdomen is soft and non distended. Skin is intact. Left inner groin pain on palpation. Pt able to lift bilateral legs. No bruising or wounds noted to left hip. Denies N/V/D, blood thinner use, SOB, weakness, LOC, head strike, dysuria, urinary frequency and urgency.

## 2024-08-19 NOTE — H&P ADULT - NSHPPHYSICALEXAM_GEN_ALL_CORE
Vital Signs Last 24 Hrs  T(C): 36.6 (19 Aug 2024 20:42), Max: 36.9 (19 Aug 2024 14:01)  T(F): 97.8 (19 Aug 2024 20:42), Max: 98.5 (19 Aug 2024 15:16)  HR: 81 (19 Aug 2024 20:42) (67 - 81)  BP: 154/68 (19 Aug 2024 20:42) (154/68 - 200/174)  BP(mean): --  RR: 16 (19 Aug 2024 20:42) (13 - 18)  SpO2: 99% (19 Aug 2024 20:42) (99% - 100%)    Parameters below as of 19 Aug 2024 20:42  Patient On (Oxygen Delivery Method): room air        PHYSICAL EXAM:  GENERAL: No Acute Distress  EYES: conjunctiva and sclera clear  ENMT: Moist mucous membranes   NECK: Supple  PULMONARY: Clear to auscultation bilaterally  CARDIAC: Regular rate and rhythm; No murmurs, rubs, or gallops  GASTROINTESTINAL: Abdomen soft, Nontender, Nondistended; Bowel sounds normal  EXTREMITIES:   No clubbing, cyanosis, or pedal edema  PSYCH: Normal Affect, Normal Behavior  NEUROLOGY:   - Mental status A&O x 3  SKIN: No rashes or lesions  MUSCULOSKELETAL: No joint swelling

## 2024-08-19 NOTE — H&P ADULT - PROBLEM SELECTOR PROBLEM 7
Patient is identified as having Combined Systolic and Diastolic heart failure that is Acute on chronic. CHF is currently controlled. Latest ECHO performed and demonstrates- Results for orders placed during the hospital encounter of 01/22/22    The patient clinically appears to be volume overloaded as evidenced by rales, JVD, peripheral edema, and shortness of breath   Echo Saline Bubble? No    Interpretation Summary  · The left ventricle is severely enlarged with mild eccentric hypertrophy and severely decreased systolic function.  · There are segmental left ventricular wall motion abnormalities. The mid anteroseptal and anterior walls are akinetic. The apex is akinetic. The remainder of the left ventricular walls are severely hypokinetic.  · The estimated ejection fraction is 20%.  · Spontaneous echocardiographic contrast in the left ventricle.  · Grade III left ventricular diastolic dysfunction.  · Severe left atrial enlargement.  · Normal right ventricular size with normal right ventricular systolic function.  · Mild right atrial enlargement.  · Mild aortic regurgitation.  · Mild-to-moderate mitral regurgitation.  · Mild tricuspid regurgitation.  · The estimated PA systolic pressure is 28 mmHg.  · Intermediate central venous pressure (8 mmHg).  · Pacemaker/ICD lead.  . Continue Beta Blocker and monitor clinical status closely. Monitor on telemetry. Patient is off CHF pathway.  Monitor strict Is&Os and daily weights.  Place on fluid restriction of 1.5 L. Continue to stress to patient importance of self efficacy and  on diet for CHF. Last BNP reviewed- and noted below No results for input(s): BNP, BNPTRIAGEBLO in the last 168 hours..    Recommend Lasix 40 IV BID until the patient is euvolemic  Strict IS and Os  Restrict fluid volume  Continue home dose carvedilol     Medication management

## 2024-08-19 NOTE — ED ADULT TRIAGE NOTE - CHIEF COMPLAINT QUOTE
Pt presents to ED via wheelchair from home with c/o L sided lower extremity pain x 3 weeks. Pt reports she tripped over a step today, denies head strike, LOC, or anticoagulant use. Pt endorses hx of TIA, DM2, HTN, and HLD.

## 2024-08-19 NOTE — H&P ADULT - HISTORY OF PRESENT ILLNESS
79 /o F with pmh of TIA (1.5 months ago), DM type 2, HTN, HLD p/w fall.  Pt was walking with assistance out of her home today when she fell slowly to the floor landing on her buttocks.  She had no head trauma.  No LOC.  She has had L hip pain over the past few days which has made it more difficult to walk, and states that hip pain contributed to her falling today.  Daughter witnessed the fall and states pt fell to the floor "in slow motion."  Pt has felt very weak chronically and now has difficulty ambulating even with the assistance of 2 people.  She has 3-4 other falls in the past several months.  She has HHA for part of the day, but is having difficulty with ADL's when HHA not there.  Pt also reports headache, today.  She took her BP at home and found that SBP was in180's which improved after taking her home metoprolol.  Pt has not had dyspnea.  She reports mild occasional chest pain.  No palpitations, dizziness, fever, chills, n/v/d.      In the ED, BP was 194/79, and pt was given amlodipine.  She was also given 1 L NS, Tylenol, and lidocaine patch.

## 2024-08-19 NOTE — ED ADULT NURSE REASSESSMENT NOTE - NS ED NURSE REASSESS COMMENT FT1
Report received from lily Lira. Pt A&ox4, ambulatory with x1 assist on room air coming to ED c/o fall. Pt history of DM type 2, HTN, HLD. Pt with no acute changes at this time. VSS. Pt respirations even and unlabored, chest rise and fall equal b/l. Pt denies chest pain, HA, SOB, dizziness, N/V/D, fever/chills. Right 20g patent, no redness or swelling to site. Stretcher in lowest position, pt safety maintained.
Pt resting comfortably in the stretcher. Pt reports partial relief of pain after being medicated. Bed is in the lowest position and safety maintained throughout.

## 2024-08-19 NOTE — H&P ADULT - NSHPREVIEWOFSYSTEMS_GEN_ALL_CORE
Review of Systems:   CONSTITUTIONAL: No fever or chills  EYES: No eye pain, visual disturbances, or discharge  ENMT:  No difficulty hearing, no throat pain  NECK: No pain or stiffness  RESPIRATORY: No cough, No shortness of breath  CARDIOVASCULAR: No chest pain, palpitations, dizziness, or leg swelling  GASTROINTESTINAL: No abdominal pain, nausea, vomiting or diarrhea  GENITOURINARY: No dysuria, or hematuria  NEUROLOGICAL:  headaches, no weakness, or numbness  SKIN: No rashes, or lesions   LYMPH NODES: No enlarged glands  ENDOCRINE: No heat or cold intolerance  MUSCULOSKELETAL: Hip pain, knee pain  PSYCHIATRIC: No depression or anxiety  HEME/LYMPH: No easy bruising, or bleeding  ALLERGY AND IMMUNOLOGIC: No hives or eczema

## 2024-08-20 DIAGNOSIS — E11.9 TYPE 2 DIABETES MELLITUS WITHOUT COMPLICATIONS: ICD-10-CM

## 2024-08-20 DIAGNOSIS — I10 ESSENTIAL (PRIMARY) HYPERTENSION: ICD-10-CM

## 2024-08-20 DIAGNOSIS — M25.552 PAIN IN LEFT HIP: ICD-10-CM

## 2024-08-20 DIAGNOSIS — Z79.899 OTHER LONG TERM (CURRENT) DRUG THERAPY: ICD-10-CM
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
## 2024-08-20 DIAGNOSIS — R53.1 WEAKNESS: ICD-10-CM

## 2024-08-20 DIAGNOSIS — Z86.73 PERSONAL HISTORY OF TRANSIENT ISCHEMIC ATTACK (TIA), AND CEREBRAL INFARCTION WITHOUT RESIDUAL DEFICITS: ICD-10-CM

## 2024-08-20 LAB
A1C WITH ESTIMATED AVERAGE GLUCOSE RESULT: 6.1 % — HIGH (ref 4–5.6)
ANION GAP SERPL CALC-SCNC: 18 MMOL/L — HIGH (ref 7–14)
BASOPHILS # BLD AUTO: 0.08 K/UL — SIGNIFICANT CHANGE UP (ref 0–0.2)
BASOPHILS NFR BLD AUTO: 0.7 % — SIGNIFICANT CHANGE UP (ref 0–2)
BUN SERPL-MCNC: 12 MG/DL — SIGNIFICANT CHANGE UP (ref 7–23)
CALCIUM SERPL-MCNC: 10.1 MG/DL — SIGNIFICANT CHANGE UP (ref 8.4–10.5)
CHLORIDE SERPL-SCNC: 92 MMOL/L — LOW (ref 98–107)
CO2 SERPL-SCNC: 17 MMOL/L — LOW (ref 22–31)
CREAT SERPL-MCNC: 0.6 MG/DL — SIGNIFICANT CHANGE UP (ref 0.5–1.3)
EGFR: 91 ML/MIN/1.73M2 — SIGNIFICANT CHANGE UP
EOSINOPHIL # BLD AUTO: 0.15 K/UL — SIGNIFICANT CHANGE UP (ref 0–0.5)
EOSINOPHIL NFR BLD AUTO: 1.2 % — SIGNIFICANT CHANGE UP (ref 0–6)
ESTIMATED AVERAGE GLUCOSE: 128 — SIGNIFICANT CHANGE UP
GLUCOSE BLDC GLUCOMTR-MCNC: 152 MG/DL — HIGH (ref 70–99)
GLUCOSE BLDC GLUCOMTR-MCNC: 154 MG/DL — HIGH (ref 70–99)
GLUCOSE BLDC GLUCOMTR-MCNC: 186 MG/DL — HIGH (ref 70–99)
GLUCOSE BLDC GLUCOMTR-MCNC: 208 MG/DL — HIGH (ref 70–99)
GLUCOSE SERPL-MCNC: 165 MG/DL — HIGH (ref 70–99)
HCT VFR BLD CALC: 41.4 % — SIGNIFICANT CHANGE UP (ref 34.5–45)
HGB BLD-MCNC: 13.8 G/DL — SIGNIFICANT CHANGE UP (ref 11.5–15.5)
IANC: 8.88 K/UL — HIGH (ref 1.8–7.4)
IMM GRANULOCYTES NFR BLD AUTO: 0.4 % — SIGNIFICANT CHANGE UP (ref 0–0.9)
LYMPHOCYTES # BLD AUTO: 1.95 K/UL — SIGNIFICANT CHANGE UP (ref 1–3.3)
LYMPHOCYTES # BLD AUTO: 16.1 % — SIGNIFICANT CHANGE UP (ref 13–44)
MAGNESIUM SERPL-MCNC: 1.4 MG/DL — LOW (ref 1.6–2.6)
MCHC RBC-ENTMCNC: 28.3 PG — SIGNIFICANT CHANGE UP (ref 27–34)
MCHC RBC-ENTMCNC: 33.3 GM/DL — SIGNIFICANT CHANGE UP (ref 32–36)
MCV RBC AUTO: 84.8 FL — SIGNIFICANT CHANGE UP (ref 80–100)
MONOCYTES # BLD AUTO: 0.97 K/UL — HIGH (ref 0–0.9)
MONOCYTES NFR BLD AUTO: 8 % — SIGNIFICANT CHANGE UP (ref 2–14)
NEUTROPHILS # BLD AUTO: 8.88 K/UL — HIGH (ref 1.8–7.4)
NEUTROPHILS NFR BLD AUTO: 73.6 % — SIGNIFICANT CHANGE UP (ref 43–77)
NRBC # BLD: 0 /100 WBCS — SIGNIFICANT CHANGE UP (ref 0–0)
NRBC # FLD: 0 K/UL — SIGNIFICANT CHANGE UP (ref 0–0)
PHOSPHATE SERPL-MCNC: 3.5 MG/DL — SIGNIFICANT CHANGE UP (ref 2.5–4.5)
PLATELET # BLD AUTO: 340 K/UL — SIGNIFICANT CHANGE UP (ref 150–400)
POTASSIUM SERPL-MCNC: 4.2 MMOL/L — SIGNIFICANT CHANGE UP (ref 3.5–5.3)
POTASSIUM SERPL-SCNC: 4.2 MMOL/L — SIGNIFICANT CHANGE UP (ref 3.5–5.3)
RBC # BLD: 4.88 M/UL — SIGNIFICANT CHANGE UP (ref 3.8–5.2)
RBC # FLD: 12.2 % — SIGNIFICANT CHANGE UP (ref 10.3–14.5)
SODIUM SERPL-SCNC: 127 MMOL/L — LOW (ref 135–145)
WBC # BLD: 12.08 K/UL — HIGH (ref 3.8–10.5)
WBC # FLD AUTO: 12.08 K/UL — HIGH (ref 3.8–10.5)

## 2024-08-20 RX ORDER — METOPROLOL TARTRATE 100 MG/1
100 TABLET ORAL DAILY
Refills: 0 | Status: DISCONTINUED | OUTPATIENT
Start: 2024-08-20 | End: 2024-08-27

## 2024-08-20 RX ORDER — CILOSTAZOL 100 MG
1 TABLET ORAL
Refills: 0 | DISCHARGE

## 2024-08-20 RX ORDER — OXYBUTYNIN CHLORIDE 5 MG/1
1 TABLET ORAL
Refills: 0 | DISCHARGE

## 2024-08-20 RX ORDER — PIOGLITAZONE 30 MG/1
1 TABLET ORAL
Refills: 0 | DISCHARGE

## 2024-08-20 RX ORDER — AMLODIPINE BESYLATE 10 MG/1
5 TABLET ORAL DAILY
Refills: 0 | Status: DISCONTINUED | OUTPATIENT
Start: 2024-08-20 | End: 2024-08-23

## 2024-08-20 RX ORDER — SODIUM BICARBONATE 84 MG/ML
650 INJECTION, SOLUTION INTRAVENOUS THREE TIMES A DAY
Refills: 0 | Status: DISCONTINUED | OUTPATIENT
Start: 2024-08-20 | End: 2024-08-22

## 2024-08-20 RX ORDER — GABAPENTIN 100 MG
100 CAPSULE ORAL DAILY
Refills: 0 | Status: DISCONTINUED | OUTPATIENT
Start: 2024-08-20 | End: 2024-09-02

## 2024-08-20 RX ORDER — SODIUM CHLORIDE 9 MG/ML
1 INJECTION INTRAMUSCULAR; INTRAVENOUS; SUBCUTANEOUS
Refills: 0 | Status: COMPLETED | OUTPATIENT
Start: 2024-08-20 | End: 2024-08-22

## 2024-08-20 RX ORDER — ASPIRIN 81 MG
1 TABLET, DELAYED RELEASE (ENTERIC COATED) ORAL
Refills: 0 | DISCHARGE

## 2024-08-20 RX ADMIN — METOPROLOL TARTRATE 100 MILLIGRAM(S): 100 TABLET ORAL at 07:17

## 2024-08-20 RX ADMIN — Medication 2: at 18:27

## 2024-08-20 RX ADMIN — ACETAMINOPHEN 650 MILLIGRAM(S): 325 TABLET ORAL at 04:38

## 2024-08-20 RX ADMIN — Medication 100 MILLIGRAM(S): at 10:03

## 2024-08-20 RX ADMIN — SODIUM BICARBONATE 650 MILLIGRAM(S): 84 INJECTION, SOLUTION INTRAVENOUS at 22:18

## 2024-08-20 RX ADMIN — AMLODIPINE BESYLATE 5 MILLIGRAM(S): 10 TABLET ORAL at 07:18

## 2024-08-20 RX ADMIN — SODIUM CHLORIDE 1 GRAM(S): 9 INJECTION INTRAMUSCULAR; INTRAVENOUS; SUBCUTANEOUS at 22:18

## 2024-08-20 NOTE — CONSULT NOTE ADULT - SUBJECTIVE AND OBJECTIVE BOX
CHIEF COMPLAINT:Patient is a 79y old  Female who presents with a chief complaint of Fall (19 Aug 2024 22:31)      HISTORY OF PRESENT ILLNESS:    79 /o F with pmh of TIA (1.5 months ago), DM type 2, HTN, HLD p/w fall.  Pt was walking with assistance out of her home today when she fell slowly to the floor landing on her buttocks.  She had no head trauma.  No LOC.  She has had L hip pain over the past few days which has made it more difficult to walk, and states that hip pain contributed to her falling today.  Daughter witnessed the fall and states pt fell to the floor "in slow motion."  Pt has felt very weak chronically and now has difficulty ambulating even with the assistance of 2 people.  She has 3-4 other falls in the past several months.  She has HHA for part of the day, but is having difficulty with ADL's when HHA not there.  Pt also reports headache, today.  She took her BP at home and found that SBP was in180's which improved after taking her home metoprolol.  Pt has not had dyspnea.  She reports mild occasional chest pain.  No palpitations, dizziness, fever, chills, n/v/d.      In the ED, BP was 194/79, and pt was given amlodipine.  She was also given 1 L NS, Tylenol, and lidocaine patch.            PAST MEDICAL & SURGICAL HISTORY:  Abdominal obesity      Benign essential hypertension      CAD (coronary atherosclerotic disease)  s/p stent      DM type 2 (diabetes mellitus, type 2)      Dyslipidemia      H/O: hysterectomy      S/P  Section      S/P hemorrhoidectomy      History of cholecystectomy              MEDICATIONS:  amLODIPine   Tablet 5 milliGRAM(s) Oral daily  metoprolol succinate  milliGRAM(s) Oral daily        acetaminophen     Tablet .. 650 milliGRAM(s) Oral every 6 hours PRN  gabapentin 100 milliGRAM(s) Oral daily      dextrose 50% Injectable 25 Gram(s) IV Push once  dextrose 50% Injectable 12.5 Gram(s) IV Push once  dextrose 50% Injectable 25 Gram(s) IV Push once  dextrose Oral Gel 15 Gram(s) Oral once PRN  glucagon  Injectable 1 milliGRAM(s) IntraMuscular once  insulin lispro (ADMELOG) corrective regimen sliding scale   SubCutaneous three times a day before meals  insulin lispro (ADMELOG) corrective regimen sliding scale   SubCutaneous at bedtime    dextrose 5%. 1000 milliLiter(s) IV Continuous <Continuous>  dextrose 5%. 1000 milliLiter(s) IV Continuous <Continuous>      FAMILY HISTORY:  No pertinent family history in first degree relatives        Non-contributory    SOCIAL HISTORY:    No tobacco, drugs or etoh    Allergies    penicillin (Rash)  sulfa drugs (Rash)  Sulfur (Anaphylaxis)  penicillin (Unknown)  penicillin (Anaphylaxis)  sulfa drugs (Unknown)    Intolerances    	    REVIEW OF SYSTEMS:  as above  The rest of the 14 points ROS reviewed and except above they are unremarkable.        PHYSICAL EXAM:  T(C): 36.6 (24 @ 20:42), Max: 36.9 (24 @ 14:01)  HR: 81 (24 @ 20:42) (67 - 81)  BP: 154/68 (24 @ 20:42) (154/68 - 200/174)  RR: 16 (24 @ 20:42) (13 - 18)  SpO2: 99% (24 @ 20:42) (99% - 100%)  Wt(kg): --  I&O's Summary    JVP: Normal  Neck: supple  Lung: clear   CV: S1 S2 , Murmur: pos yamileth   Abd: soft  Ext: No edema  neuro: Awake / alert  Psych: flat affect  Skin: normal      LABS/DATA:    TELEMETRY: 	    ECG:  	   	  CARDIAC MARKERS:                                      11.4   10.71 )-----------( 383      ( 19 Aug 2024 13:00 )             35.2         125<L>  |  89<L>  |  17  ----------------------------<  116<H>  4.4   |  22  |  0.90    Ca    9.6      19 Aug 2024 13:00  Mg     1.30         TPro  7.5  /  Alb  4.2  /  TBili  0.3  /  DBili  x   /  AST  11  /  ALT  9   /  AlkPhos  51      proBNP:   Lipid Profile:   HgA1c:   TSH:

## 2024-08-20 NOTE — PHARMACOTHERAPY INTERVENTION NOTE - COMMENTS
Medication list updated in Outpatient Medication Record (OMR). OMR updated as per [facility]    Home Medications:  Aspirin EC 81 mg oral delayed release tablet: 1 tab(s) orally once a day   cilostazol 50 mg oral tablet: 1 tab(s) orally 2 times a day   gabapentin 100 mg oral capsule: 1 cap(s) orally once a day (at bedtime)   losartan 25 mg oral tablet: 1 tab(s) orally once a day   ?meclizine 25 mg oral tablet: 1 tab(s) orally 2 times a day as needed for  dizziness ...(last fill 6/5/2024 x 30 tablets)   metFORMIN 1000 mg oral tablet: 1 tab(s) orally 2 times a day   metoprolol succinate 100 mg oral tablet, extended release: 1 tab(s) orally once a day   multivitamin: 1 tab(s) orally once a day   oxyBUTYnin 10 mg/24 hr oral tablet, extended release: 1 tab(s) orally once a day   pioglitazone 15 mg oral tablet: 1 tab(s) orally once a day   simvastatin 40 mg oral tablet: 1 tab(s) orally once a day (at bedtime)   Tradjenta 5 mg oral tablet: 1 tab(s) orally once a day

## 2024-08-20 NOTE — PROGRESS NOTE ADULT - ASSESSMENT
79 /o F with pmh of TIA (1.5 months ago), DM type 2, HTN, HLD p/w fall.        Problem/Plan - 1:  ·  Problem: Left hip pain.   ·  Plan: Possibly OA.  Improved after tylenol  - tylenol PRN if worsens again.    Problem/Plan - 2:  ·  Problem: Fall.   ·  Plan: - PT eval  - may require increased services at home.    Problem/Plan - 3:  ·  Problem: Generalized weakness.   ·  Plan: 2/2 deconditioning   - PT eval.    Problem/Plan - 4:  ·  Problem: Type 2 diabetes mellitus.   ·  Plan: - hold metformin  - Lispro sliding scale.    Problem/Plan - 5:  ·  Problem: Essential hypertension.   ·  Plan: - Continue home metoprolol  - unclear if pt is taking a second agent.  Will add amlodipine for now  - confirm home meds (See below).    Problem/Plan - 6:  ·  Problem: History of transient ischemic attack (TIA).   ·  Plan: - cw ASA and statin.  neuro called

## 2024-08-20 NOTE — HISTORY OF PRESENT ILLNESS
DATE:  8/20/2024   CONSULTING PROVIDER:  Kari Frederick PA-C  REFERRING PHYSICIAN:  Jt Nice MD  CHIEF COMPLAINT: Morbid Obesity    **Self Pay**    CC: Morbid Obesity    Subjective:     Ian Duran is a 43 year old obese male whom I was consulted by Jt Nice MD to discuss options for weight reduction surgery. He has exhausted attempts at medical weight loss and wishes to pursue surgery for durable control of excess weight and the medical problems that results from their obesity.     Note: patient had prior initial consultation for bariatric surgery on 10/5/24.  He did not have insurance coverage at that time for bariatric surgery.  He now Self Pay.      Motivating Factor for Pursuing Weight Loss: \"Be more active\"  Patient has a 5 year old son.      Informational Seminar: Yes     Heaviest Lifetime Weight:  338 LBS    Lowest Weight: 250 LBS      Dietary Attempts: Online course.       Diet Difficulties: [employer] for lunch, Kwik trip and getting candy, eat before bedtime, too many snacks     Weight Loss Medication: Had previously been on Wegovy.  Now off as no insurance coverage.  Patient did regain weight; about 24 lbs since consult on 10/5/24.     Exercise: No      Mobility Status: Fully Independent      Smoking History: None     Alcohol use: Rare Occasions     Illicit Drug use: None     Previous Mental Health Hospitalizations: None        Obesity-related Medical Conditions:      Hypertension: Improved.  Not on medicaton. Had previously been on Lisinopril.      Severe MAYRA: Sleep study on 10/12/12 revealing Severe MAYRA.  Most recent cpap download from 2018 revealing patient wore cpap 30/30 days.  Currently, wearing cpap nightly.      Hypertriglyceridemia: Noted on lipid panel.  No meds.     Heartburn: Rare occasion.  No meds.        Prior Abdominal Surgeries:  None     Anesthesia Issues: None    Past Medical History:   Diagnosis Date    Essential (primary) hypertension     Obstructive sleep apnea  (adult) (pediatric)     RAD (reactive airway disease)       Past Surgical History:   Procedure Laterality Date    Tonsillectomy and adenoidectomy      Varicose vein surgery N/A 01/01/2007    testicle    Vasectomy        ALLERGIES:   Allergen Reactions    Seasonal       Social History     Tobacco Use    Smoking status: Never    Smokeless tobacco: Never   Substance Use Topics    Alcohol use: Yes     Alcohol/week: 0.0 standard drinks of alcohol     Comment: Very Little ETOH      Family History   Problem Relation Age of Onset    Osteoarthritis Mother     Depression Mother     Hypertension Mother     Depression Father     Heart disease Father     Patient is unaware of any medical problems Brother     ADHD/ADD Daughter     Patient is unaware of any medical problems Daughter     Patient is unaware of any medical problems Son     Cancer Maternal Grandmother     Congestive Heart Failure Maternal Grandfather     Congestive Heart Failure Paternal Grandfather       Prior to Admission medications    Medication Sig Start Date End Date Taking? Authorizing Provider   sildenafil (VIAGRA) 50 MG tablet Take 1 tablet by mouth daily as needed for Erectile Dysfunction. 2/26/24   Jt Nice MD   MULTIPLE VITAMIN PO Take by mouth daily.    Provider, Outside       Review of Systems   General/Constitutional:  Negative for fever, chills.  HEENT:  No dysphagia or odynophagia.   Cardiovascular:  Negative for palpitations or chest pain.    Respiratory:  Negative for chronic or productive cough or hemoptysis.    Gastrointestinal:  No abdominal pain, nausea, vomiting, or changes in bowel habits.    Genitourinary: Negative for dysuria or kidney stones  Musculoskeletal: Negative for other arthralgias, myalgias, or back pain than described.   Neurological: Negative for seizures.   Psychiatric: Negative for untreated disorders.   Lymph/Heme: No history clotting or bleeding disorders.   Endocrine: Negative for thyroid or other endocrine  disorders   Skin: Negative for acute skin lesions.       Objective:     Visit Vitals  /80   Pulse 93   Ht 6' 1.5\" (1.867 m)   Wt (!) 153.6 kg (338 lb 9.6 oz)   SpO2 96%   BMI 44.07 kg/m²    Body mass index is 44.07 kg/m². (!) 153.6 kg (338 lb 9.6 oz)    Constitutional: He is oriented and developed, nourished, and not distressed.   Psychiatric: Affect normal.     Data Review:  Lab Results   Component Value Date    WBC 6.9 02/22/2023    WBC 7.9 12/05/2012    MCV 88.3 02/22/2023    MCV 86.5 12/05/2012     Lab Results   Component Value Date    SODIUM 137 02/16/2024    SODIUM 139 02/04/2019    POTASSIUM 4.3 02/16/2024    POTASSIUM 4.3 02/04/2019    GLUCOSE 115 (H) 02/16/2024    GLUCOSE 102 (H) 02/04/2019    CHLORIDE 110 02/16/2024    CHLORIDE 104 02/04/2019    CO2 24 02/16/2024    CO2 23 02/04/2019    BUN 25 (H) 02/16/2024    BUN 22 (H) 02/04/2019    CREATININE 0.88 02/16/2024    CREATININE 0.93 02/04/2019     Lab Results   Component Value Date    AST 19 02/16/2024    AST 27 02/04/2019    BILIRUBIN 0.5 02/16/2024    BILIRUBIN 0.6 02/04/2019     Lab Results   Component Value Date    HGBA1C 5.6 02/16/2024       IMAGING:   None    Assessment:     Morbid Obesity     Plan:     The patient was informed of their options that included continued, nonoperative medical management, as well as, other available surgical options such as adjustable gastric banding, sleeve and RNYGB.     Discussed importance of continued efforts at weight loss through dietary modification and healthy lifestyle choices    Discussed available bariatric operations, risks/benefits, smoking policy, expected oleksandr-op course, and outcomes at length with the patient. Key points include:    Risks and benefits: Patient informed of options including non-operative management as well as the available surgical options. We discussed keys aspect of the operations including anatomic changes, mechanism of weight loss, diet / lifestyle changes required for success,  expected oleksandr-operative course, common post-operative concerns, and potential complications. These complications include but are not limited to: pain, bleeding requiring transfusion, infections, hernia development (internal hernias for bypass and incisional hernias for all operations), leaks from staple lines and/or anastomosis, strictures, difficulty tolerating oral intake, GERD (particularly following the sleeve gastrectomy), GI ulcers, injury to bowel or other structures, conversion to an open procedure, long-term vitamin and nutritional deficiencies, and risks of anesthesia and major surgery in general which include post-op deconditioning, cardiovascular and/or pulmonary complications including heart attacks, blood clots / DVTs, pulmonary emboli, and even death.     Smoking policy: As we have a zero-smoking policy (for all tobacco products) prior to elective bariatric surgery, any evidence of tobacco smoking will result in cancelling or delaying the operation.     Expected post-op course and weight loss outcomes: Emphasized that weight loss and outcomes are not guaranteed, as these are not only different for each individual, but both post-op recovery and weight loss are greatly dependent on the efforts of the patient as bariatric surgery is only a tool to increase the effectiveness of diet / lifestyle efforts aimed at improving health, and does not automatically generate these outcomes.  Our goal is to lose weight and ameliorate medical problems and not to obtain a specific body mass index.      Discussed the need for dietary evaluation, psychological evaluation, support group attendance and attendance at the bariatric informational seminar. Initial Dietary, Psychology, and Physical Therapy for Exercise Evaluation referrals will be placed once the patient has completed a negative urine drug and nicotine screen.          Patient interested in pursuing the Sleeve Gastrectomy.  We will plan for Sleeve  Gastrectomy.      The patient will need the following:      To Do List:   Complete Urine Drug and Nicotine Screens  Weight Loss Goal While in Bariatric Surgery Program: 5% of Excess Body Weight  MAYRA: obtain cpap download at program follow up    Follow up with Kari Frederick PA-C in 3 months after entering the bariatric surgery program.          Kari Frederick PA-C    ____________________________________________________________________________           [FreeTextEntry1] : pt is on pletal 50 bid\par pt states elisha le and foot nocturnal  cramps have sig improved \par pt is compliant w comp stockings\par pt states no le swelling or discomfort  c/o at this time  [de-identified] : pt is on pletal 50 bid\par pt states elisha le and foot nocturnal  cramps have sig improved  and now able to sleep\par pt is compliant w comp stockings\par pt states minimal le swelling or discomfort since last ov \par pt states no le wounds

## 2024-08-20 NOTE — PATIENT PROFILE ADULT - FALL HARM RISK - HARM RISK INTERVENTIONS

## 2024-08-20 NOTE — PROGRESS NOTE ADULT - ASSESSMENT
79-year-old female with PMH TIA 1 and half months ago, DM2, HTN, HLD presents for a fall. nephrology consulted for hyponatremia    Hyponatremia  follows up with Dr. Manzanares in office  seen in 2022 work up at that time was polydipsia  daughter also confirmed patient still drinks alot of water  check urine osmo, na  check tsh, cortisol level  free water restriction <1L/day  given 1liter NS inED  na slightly better. will start na bicarb bid x2 days  avoid overcorrection. na should not correct >6-8meq in 24 hrs    htn  acceptable  resume home meds  monitor    acidosis  non ag  bicarb 650 tid x2 days    weakness  work up per team

## 2024-08-20 NOTE — CONSULT NOTE ADULT - ASSESSMENT
S/p Fall   check orthostatic vitals  PT eval     murmur  obtain echo     hyponatremia  ? polydipsia  plan as per renal     history of TIA  consider low dose asa  statin      Advanced care planning was discussed with patient and family.  Risks, benefits and alternatives of the cardiac treatments and medical therapy including procedures were discussed in detail and all questions were answered. Importance of compliance with medical therapy and lifestyle modification to improve cardiovascular health were addressed. Appropriate forms and patient educational materials were reviewed. 30 minutes face to face spent.

## 2024-08-21 ENCOUNTER — RESULT REVIEW (OUTPATIENT)
Age: 79
End: 2024-08-21

## 2024-08-21 LAB
ANION GAP SERPL CALC-SCNC: 14 MMOL/L — SIGNIFICANT CHANGE UP (ref 7–14)
BASOPHILS # BLD AUTO: 0.06 K/UL — SIGNIFICANT CHANGE UP (ref 0–0.2)
BASOPHILS NFR BLD AUTO: 0.5 % — SIGNIFICANT CHANGE UP (ref 0–2)
BUN SERPL-MCNC: 17 MG/DL — SIGNIFICANT CHANGE UP (ref 7–23)
CALCIUM SERPL-MCNC: 9.7 MG/DL — SIGNIFICANT CHANGE UP (ref 8.4–10.5)
CHLORIDE SERPL-SCNC: 91 MMOL/L — LOW (ref 98–107)
CO2 SERPL-SCNC: 22 MMOL/L — SIGNIFICANT CHANGE UP (ref 22–31)
CORTIS AM PEAK SERPL-MCNC: 22 UG/DL — HIGH (ref 6–18.4)
CREAT SERPL-MCNC: 0.8 MG/DL — SIGNIFICANT CHANGE UP (ref 0.5–1.3)
EGFR: 75 ML/MIN/1.73M2 — SIGNIFICANT CHANGE UP
EOSINOPHIL # BLD AUTO: 0.17 K/UL — SIGNIFICANT CHANGE UP (ref 0–0.5)
EOSINOPHIL NFR BLD AUTO: 1.5 % — SIGNIFICANT CHANGE UP (ref 0–6)
GLUCOSE BLDC GLUCOMTR-MCNC: 162 MG/DL — HIGH (ref 70–99)
GLUCOSE BLDC GLUCOMTR-MCNC: 180 MG/DL — HIGH (ref 70–99)
GLUCOSE BLDC GLUCOMTR-MCNC: 187 MG/DL — HIGH (ref 70–99)
GLUCOSE BLDC GLUCOMTR-MCNC: 225 MG/DL — HIGH (ref 70–99)
GLUCOSE SERPL-MCNC: 184 MG/DL — HIGH (ref 70–99)
HCT VFR BLD CALC: 35.8 % — SIGNIFICANT CHANGE UP (ref 34.5–45)
HGB BLD-MCNC: 12 G/DL — SIGNIFICANT CHANGE UP (ref 11.5–15.5)
IANC: 7.87 K/UL — HIGH (ref 1.8–7.4)
IMM GRANULOCYTES NFR BLD AUTO: 0.5 % — SIGNIFICANT CHANGE UP (ref 0–0.9)
LYMPHOCYTES # BLD AUTO: 2.39 K/UL — SIGNIFICANT CHANGE UP (ref 1–3.3)
LYMPHOCYTES # BLD AUTO: 20.5 % — SIGNIFICANT CHANGE UP (ref 13–44)
MAGNESIUM SERPL-MCNC: 1.3 MG/DL — LOW (ref 1.6–2.6)
MCHC RBC-ENTMCNC: 27.9 PG — SIGNIFICANT CHANGE UP (ref 27–34)
MCHC RBC-ENTMCNC: 33.5 GM/DL — SIGNIFICANT CHANGE UP (ref 32–36)
MCV RBC AUTO: 83.3 FL — SIGNIFICANT CHANGE UP (ref 80–100)
MONOCYTES # BLD AUTO: 1.1 K/UL — HIGH (ref 0–0.9)
MONOCYTES NFR BLD AUTO: 9.4 % — SIGNIFICANT CHANGE UP (ref 2–14)
NEUTROPHILS # BLD AUTO: 7.87 K/UL — HIGH (ref 1.8–7.4)
NEUTROPHILS NFR BLD AUTO: 67.6 % — SIGNIFICANT CHANGE UP (ref 43–77)
NRBC # BLD: 0 /100 WBCS — SIGNIFICANT CHANGE UP (ref 0–0)
NRBC # FLD: 0 K/UL — SIGNIFICANT CHANGE UP (ref 0–0)
OSMOLALITY UR: 496 MOSM/KG — SIGNIFICANT CHANGE UP (ref 50–1200)
PHOSPHATE SERPL-MCNC: 3.4 MG/DL — SIGNIFICANT CHANGE UP (ref 2.5–4.5)
PLATELET # BLD AUTO: 389 K/UL — SIGNIFICANT CHANGE UP (ref 150–400)
POTASSIUM SERPL-MCNC: 4.3 MMOL/L — SIGNIFICANT CHANGE UP (ref 3.5–5.3)
POTASSIUM SERPL-SCNC: 4.3 MMOL/L — SIGNIFICANT CHANGE UP (ref 3.5–5.3)
RBC # BLD: 4.3 M/UL — SIGNIFICANT CHANGE UP (ref 3.8–5.2)
RBC # FLD: 12.6 % — SIGNIFICANT CHANGE UP (ref 10.3–14.5)
SODIUM SERPL-SCNC: 127 MMOL/L — LOW (ref 135–145)
SODIUM UR-SCNC: 36 MMOL/L — SIGNIFICANT CHANGE UP
TSH SERPL-MCNC: 1.12 UIU/ML — SIGNIFICANT CHANGE UP (ref 0.27–4.2)
WBC # BLD: 11.65 K/UL — HIGH (ref 3.8–10.5)
WBC # FLD AUTO: 11.65 K/UL — HIGH (ref 3.8–10.5)

## 2024-08-21 PROCEDURE — 93356 MYOCRD STRAIN IMG SPCKL TRCK: CPT

## 2024-08-21 PROCEDURE — 93306 TTE W/DOPPLER COMPLETE: CPT | Mod: 26

## 2024-08-21 RX ORDER — CHLORHEXIDINE GLUCONATE 40 MG/ML
1 SOLUTION TOPICAL DAILY
Refills: 0 | Status: DISCONTINUED | OUTPATIENT
Start: 2024-08-21 | End: 2024-09-02

## 2024-08-21 RX ADMIN — Medication 25 GRAM(S): at 18:18

## 2024-08-21 RX ADMIN — AMLODIPINE BESYLATE 5 MILLIGRAM(S): 10 TABLET ORAL at 06:42

## 2024-08-21 RX ADMIN — Medication 1: at 13:34

## 2024-08-21 RX ADMIN — METOPROLOL TARTRATE 100 MILLIGRAM(S): 100 TABLET ORAL at 06:42

## 2024-08-21 RX ADMIN — SODIUM BICARBONATE 650 MILLIGRAM(S): 84 INJECTION, SOLUTION INTRAVENOUS at 21:19

## 2024-08-21 RX ADMIN — CHLORHEXIDINE GLUCONATE 1 APPLICATION(S): 40 SOLUTION TOPICAL at 14:41

## 2024-08-21 RX ADMIN — SODIUM BICARBONATE 650 MILLIGRAM(S): 84 INJECTION, SOLUTION INTRAVENOUS at 14:37

## 2024-08-21 RX ADMIN — ACETAMINOPHEN 650 MILLIGRAM(S): 325 TABLET ORAL at 18:17

## 2024-08-21 RX ADMIN — Medication 1: at 09:32

## 2024-08-21 RX ADMIN — Medication 1: at 18:10

## 2024-08-21 RX ADMIN — SODIUM BICARBONATE 650 MILLIGRAM(S): 84 INJECTION, SOLUTION INTRAVENOUS at 06:42

## 2024-08-21 RX ADMIN — Medication 100 MILLIGRAM(S): at 14:37

## 2024-08-21 RX ADMIN — SODIUM CHLORIDE 1 GRAM(S): 9 INJECTION INTRAMUSCULAR; INTRAVENOUS; SUBCUTANEOUS at 18:18

## 2024-08-21 RX ADMIN — SODIUM CHLORIDE 1 GRAM(S): 9 INJECTION INTRAMUSCULAR; INTRAVENOUS; SUBCUTANEOUS at 18:51

## 2024-08-21 NOTE — CONSULT NOTE ADULT - ASSESSMENT
79F with recent TIA (6 weeks ago),  SAH with IVH in 2015, DM, CAD, and HLD presents s/p fall. Pt was walking with assistance out of her home today when she fell slowly to the floor landing on her buttocks. No head trauma. C/o left sided weakness  L hemiparesis on exam       79F with recent TIA (6 weeks ago),  SAH with IVH in 2015, DM, CAD, and HLD presents s/p fall. Pt was walking with assistance out of her home today when she fell slowly to the floor landing on her buttocks. No head trauma. C/o left sided weakness  L hemiparesis on exam, pain limited at L hip  A1C 6.1  TTE EF > 75%, no valvular abnomalities  orthostatics neg     Fall - mechanical ?deconditioning   Recent TIA - now with L hemiparesis , unclear if pain related vs stroke    - aspirin 81 and statin for secondary stroke preventino  - TTE as above  - MRI brain w/o as inpt vs outpt  - check LDL, titrate statin for LDL < 70  - NSAIDs for L hip pain  - PT/OT  - DVT ppx     Evonne Tenorio DO  Vascular Neurology  Office [phone]

## 2024-08-21 NOTE — PROGRESS NOTE ADULT - ASSESSMENT
S/p Fall   neg orthostatic vitals  PT eval     murmur  obtain echo     hyponatremia  ? polydipsia  plan as per renal     history of TIA  consider low dose asa  statin      Advanced care planning was discussed with patient and family.  Risks, benefits and alternatives of the cardiac treatments and medical therapy including procedures were discussed in detail and all questions were answered. Importance of compliance with medical therapy and lifestyle modification to improve cardiovascular health were addressed. Appropriate forms and patient educational materials were reviewed. 30 minutes face to face spent.

## 2024-08-21 NOTE — PHYSICAL THERAPY INITIAL EVALUATION ADULT - PATIENT PROFILE REVIEW, REHAB EVAL
Activity - Ambulate with Assistance. Pt cleared for PT evaluation prior to session by [facility] Head./yes

## 2024-08-21 NOTE — PHYSICAL THERAPY INITIAL EVALUATION ADULT - ADDITIONAL COMMENTS
Pt states she lives in a house alone with 7 steps to enter, son lives on the second floor. Pt reports being independent in ambulation with a cane, states she requires assistance for ADLs. Pt has a home health aide 7 days/week for 5 hours/day, owns a shower chair.    Following evaluation, pt was left semireclined in bed in no distress, all lines in tact, call bell in reach. Opt out

## 2024-08-21 NOTE — PROGRESS NOTE ADULT - ASSESSMENT
79-year-old female with PMH TIA 1 and half months ago, DM2, HTN, HLD presents for a fall. nephrology consulted for hyponatremia    Hyponatremia  follows up with Dr. Manzanares in office  seen in 2022 work up at that time was polydipsia  daughter also confirmed patient still drinks alot of water  check urine osmo, na  normal tsh, cortisol level  free water restriction <1L/day  given 1liter NS inED  na stable. will start na bicarb bid x2 days and na tab  avoid overcorrection. na should not correct >6-8meq in 24 hrs    htn  acceptable  resume home meds  monitor    acidosis  non ag  bicarb 650 tid x2 days    weakness  work up per team

## 2024-08-21 NOTE — CONSULT NOTE ADULT - SUBJECTIVE AND OBJECTIVE BOX
Neurology Consult    Reason for Consult: Patient is a 79y old  Female who presents with a chief complaint of Fall (21 Aug 2024 13:26)      HPI:  79 /o F with pmh of TIA (1.5 months ago), DM type 2, HTN, HLD p/w fall.  Pt was walking with assistance out of her home today when she fell slowly to the floor landing on her buttocks.  She had no head trauma.  No LOC.  She has had L hip pain over the past few days which has made it more difficult to walk, and states that hip pain contributed to her falling today.  Daughter witnessed the fall and states pt fell to the floor "in slow motion."  Pt has felt very weak chronically and now has difficulty ambulating even with the assistance of 2 people.  She has 3-4 other falls in the past several months.  She has HHA for part of the day, but is having difficulty with ADL's when HHA not there.  Pt also reports headache, today.  She took her BP at home and found that SBP was in180's which improved after taking her home metoprolol.  Pt has not had dyspnea.  She reports mild occasional chest pain.  No palpitations, dizziness, fever, chills, n/v/d.      In the ED, BP was 194/79, and pt was given amlodipine.  She was also given 1 L NS, Tylenol, and lidocaine patch.   (19 Aug 2024 22:31)       PAST MEDICAL & SURGICAL HISTORY:  Abdominal obesity      Benign essential hypertension      CAD (coronary atherosclerotic disease)  s/p stent      DM type 2 (diabetes mellitus, type 2)      Dyslipidemia      H/O: hysterectomy      S/P  Section      S/P hemorrhoidectomy      History of cholecystectomy          Allergies: Allergies    penicillin (Rash)  sulfa drugs (Rash)  Sulfur (Anaphylaxis)  penicillin (Unknown)  penicillin (Anaphylaxis)  sulfa drugs (Unknown)    Intolerances        Social History: Denies toxic habits including tobacco, ETOH or illicit drugs.    Family History: FAMILY HISTORY:  No pertinent family history in first degree relatives    . No family history of strokes    Medications: MEDICATIONS  (STANDING):  amLODIPine   Tablet 5 milliGRAM(s) Oral daily  chlorhexidine 2% Cloths 1 Application(s) Topical daily  dextrose 5%. 1000 milliLiter(s) (50 mL/Hr) IV Continuous <Continuous>  dextrose 5%. 1000 milliLiter(s) (100 mL/Hr) IV Continuous <Continuous>  dextrose 50% Injectable 25 Gram(s) IV Push once  dextrose 50% Injectable 25 Gram(s) IV Push once  dextrose 50% Injectable 12.5 Gram(s) IV Push once  gabapentin 100 milliGRAM(s) Oral daily  glucagon  Injectable 1 milliGRAM(s) IntraMuscular once  insulin lispro (ADMELOG) corrective regimen sliding scale   SubCutaneous at bedtime  insulin lispro (ADMELOG) corrective regimen sliding scale   SubCutaneous three times a day before meals  metoprolol succinate  milliGRAM(s) Oral daily  sodium bicarbonate 650 milliGRAM(s) Oral three times a day  sodium chloride 1 Gram(s) Oral two times a day    MEDICATIONS  (PRN):  acetaminophen     Tablet .. 650 milliGRAM(s) Oral every 6 hours PRN Temp greater or equal to 38C (100.4F), Mild Pain (1 - 3), Moderate Pain (4 - 6), Severe Pain (7 - 10)  dextrose Oral Gel 15 Gram(s) Oral once PRN Blood Glucose LESS THAN 70 milliGRAM(s)/deciliter      Review of Systems:  CONSTITUTIONAL:  No weight loss, fever, chills, weakness or fatigue.  HEENT:  Eyes:  No visual loss, blurred vision, double vision or yellow sclera. Ears, Nose, Throat:  No hearing loss, sneezing, congestion, runny nose or sore throat.  SKIN:  No rash or itching.  CARDIOVASCULAR:  No chest pain, chest pressure or chest discomfort. No palpitations or edema.  RESPIRATORY:  No shortness of breath, cough or sputum.  GASTROINTESTINAL:  No anorexia, nausea, vomiting or diarrhea. No abdominal pain or blood.  GENITOURINARY:  No burning on urination or incontinence   NEUROLOGICAL:  No headache, dizziness, syncope, paralysis, ataxia, numbness or tingling in the extremities. No change in bowel or bladder control. no limb weakness. no vision changes.   MUSCULOSKELETAL:  No muscle, back pain, joint pain or stiffness.  HEMATOLOGIC:  No anemia, bleeding or bruising.  LYMPHATICS:  No enlarged nodes. No history of splenectomy.  PSYCHIATRIC:  No history of depression or anxiety.  ENDOCRINOLOGIC:  No reports of sweating, cold or heat intolerance. No polyuria or polydipsia.      Vitals:  Vital Signs Last 24 Hrs  T(C): 36.3 (21 Aug 2024 10:42), Max: 37.1 (20 Aug 2024 20:25)  T(F): 97.4 (21 Aug 2024 10:42), Max: 98.7 (20 Aug 2024 20:25)  HR: 70 (21 Aug 2024 10:42) (70 - 88)  BP: 161/64 (21 Aug 2024 10:42) (136/70 - 184/78)  BP(mean): --  RR: 17 (21 Aug 2024 10:42) (17 - 19)  SpO2: 100% (21 Aug 2024 10:42) (99% - 100%)    Parameters below as of 21 Aug 2024 10:42  Patient On (Oxygen Delivery Method): room air        General Exam:   General Appearance: Appropriately dressed and in no acute distress       Head: Normocephalic, atraumatic and no dysmorphic features  Ear, Nose, and Throat: Moist mucous membranes  CVS: S1S2+  Resp: No SOB, no wheeze or rhonchi  GI: soft NT/ND  Extremities: No edema or cyanosis  Skin: No bruises or rashes     Neurological Exam:  Mental Status: Awake, alert and oriented x 3.  Able to follow simple and complex verbal commands. Able to name and repeat. fluent speech. No obvious aphasia or dysarthria noted.   Cranial Nerves: PERRL, EOMI, VFFC, sensation V1-V3 intact,  L NLF,  equal elevation of palate, scm/trap 5/5, tongue is midline on protrusion.  hearing is grossly intact.   Motor: LUE and LLE drift   Sensation: dec on the L    Reflexes: 1+ throughout at biceps, brachioradialis, triceps, patellars and ankles bilaterally and equal. No clonus. R toe and L toe were both downgoing.  Coordination: No dysmetria on FNF  Gait: deferred    Data/Labs/Imaging which I personally reviewed.     Labs:     CBC Full  -  ( 21 Aug 2024 06:01 )  WBC Count : 11.65 K/uL  RBC Count : 4.30 M/uL  Hemoglobin : 12.0 g/dL  Hematocrit : 35.8 %  Platelet Count - Automated : 389 K/uL  Mean Cell Volume : 83.3 fL  Mean Cell Hemoglobin : 27.9 pg  Mean Cell Hemoglobin Concentration : 33.5 gm/dL  Auto Neutrophil # : 7.87 K/uL  Auto Lymphocyte # : 2.39 K/uL  Auto Monocyte # : 1.10 K/uL  Auto Eosinophil # : 0.17 K/uL  Auto Basophil # : 0.06 K/uL  Auto Neutrophil % : 67.6 %  Auto Lymphocyte % : 20.5 %  Auto Monocyte % : 9.4 %  Auto Eosinophil % : 1.5 %  Auto Basophil % : 0.5 %    08-    127<L>  |  91<L>  |  17  ----------------------------<  184<H>  4.3   |  22  |  0.80    Ca    9.7      21 Aug 2024 06:01  Phos  3.4     08-  Mg     1.30     08-          Urinalysis Basic - ( 21 Aug 2024 06:01 )    Color: x / Appearance: x / SG: x / pH: x  Gluc: 184 mg/dL / Ketone: x  / Bili: x / Urobili: x   Blood: x / Protein: x / Nitrite: x   Leuk Esterase: x / RBC: x / WBC x   Sq Epi: x / Non Sq Epi: x / Bacteria: x        < from: CT Brain Stroke Protocol (24 @ 14:51) >    ACC: [id number] EXAM:  CT BRAIN STROKE PROTOCOL   ORDERED BY: SARA SNIDER     PROCEDURE DATE:  2024          INTERPRETATION:  CLINICAL INFORMATION: Code stroke, 10 min episode of   aphasia that's returned to baseline, last known normal at 1030am.  NIHHS   0    TECHNIQUE: Noncontrast axial CT images were acquired through the head.   Two-dimensional sagittal and coronal reformats were generated.    COMPARISON STUDY: MR brain 2015, CT brain 2024    FINDINGS:    No acute intracranial hemorrhage, mass effect, vasogenic edema, or   evidence of acute territorial infarct.    Ventricle size is proportionate for age.  Mild prominent gyri and foci   particularly of the left frontotemporal region minimally changed from   2024. There is mild patchy white matter hypoattenuation which is   nonspecific in etiology but likely related to chronic microvascular   changes.    The visualized paranasal sinuses are clear. The mastoid air cells and   middle ear cavities are clear.    The globes are unremarkable.    The soft tissues of the scalp are unremarkable. The calvarium is intact.    IMPRESSION:    No CT evidence of acute intracranial hemorrhage, brain edema, or mass   effect. Consider MRI as clinically warranted.    Findings discussed with Dr. Rodriguez from neurology at 2:43 pm on 2024   by YESSY Veloz MD.    < end of copied text >  < from: MRI Head w/wo Cont (01.27.15 @ 19:11) >    IMPRESSION: Evidence of intraventricular hemorrhage layering in the   occipital horns of the lateral ventricles without hydrocephalus. Some   residual subarachnoid hemorrhage in the prepontine and suprasellar   regions as well as some trace what appears to be subarachnoid hemorrhage   in the high left temporal parietal region on the susceptibility and   gradient echo sequences. No definite visualization of a fistula.    < end of copied text >     Neurology Consult    Reason for Consult: Patient is a 79y old  Female who presents with a chief complaint of Fall (21 Aug 2024 13:26)      HPI:  79 /o F with pmh of TIA (1.5 months ago), DM type 2, HTN, HLD p/w fall.  Pt was walking with assistance out of her home today when she fell slowly to the floor landing on her buttocks.  She had no head trauma.  No LOC.  She has had L hip pain over the past few days which has made it more difficult to walk, and states that hip pain contributed to her falling today.  Daughter witnessed the fall and states pt fell to the floor "in slow motion."  Pt has felt very weak chronically and now has difficulty ambulating even with the assistance of 2 people.  She has 3-4 other falls in the past several months.  She has HHA for part of the day, but is having difficulty with ADL's when HHA not there.  Pt also reports headache, today.  She took her BP at home and found that SBP was in180's which improved after taking her home metoprolol.  Pt has not had dyspnea.  She reports mild occasional chest pain.    C/o L sided weakness for several weeks, inability to ambulate due to LLE weakness     PAST MEDICAL & SURGICAL HISTORY:  Abdominal obesity      Benign essential hypertension      CAD (coronary atherosclerotic disease)  s/p stent      DM type 2 (diabetes mellitus, type 2)      Dyslipidemia      H/O: hysterectomy      S/P  Section      S/P hemorrhoidectomy      History of cholecystectomy          Allergies: Allergies    penicillin (Rash)  sulfa drugs (Rash)  Sulfur (Anaphylaxis)  penicillin (Unknown)  penicillin (Anaphylaxis)  sulfa drugs (Unknown)    Intolerances        Social History: Denies toxic habits including tobacco, ETOH or illicit drugs.    Family History: FAMILY HISTORY:  No pertinent family history in first degree relatives    . No family history of strokes    Medications: MEDICATIONS  (STANDING):  amLODIPine   Tablet 5 milliGRAM(s) Oral daily  chlorhexidine 2% Cloths 1 Application(s) Topical daily  dextrose 5%. 1000 milliLiter(s) (50 mL/Hr) IV Continuous <Continuous>  dextrose 5%. 1000 milliLiter(s) (100 mL/Hr) IV Continuous <Continuous>  dextrose 50% Injectable 25 Gram(s) IV Push once  dextrose 50% Injectable 25 Gram(s) IV Push once  dextrose 50% Injectable 12.5 Gram(s) IV Push once  gabapentin 100 milliGRAM(s) Oral daily  glucagon  Injectable 1 milliGRAM(s) IntraMuscular once  insulin lispro (ADMELOG) corrective regimen sliding scale   SubCutaneous at bedtime  insulin lispro (ADMELOG) corrective regimen sliding scale   SubCutaneous three times a day before meals  metoprolol succinate  milliGRAM(s) Oral daily  sodium bicarbonate 650 milliGRAM(s) Oral three times a day  sodium chloride 1 Gram(s) Oral two times a day    MEDICATIONS  (PRN):  acetaminophen     Tablet .. 650 milliGRAM(s) Oral every 6 hours PRN Temp greater or equal to 38C (100.4F), Mild Pain (1 - 3), Moderate Pain (4 - 6), Severe Pain (7 - 10)  dextrose Oral Gel 15 Gram(s) Oral once PRN Blood Glucose LESS THAN 70 milliGRAM(s)/deciliter      Review of Systems:  CONSTITUTIONAL:  No weight loss, fever, chills, weakness or fatigue.  HEENT:  Eyes:  No visual loss, blurred vision, double vision or yellow sclera. Ears, Nose, Throat:  No hearing loss, sneezing, congestion, runny nose or sore throat.  SKIN:  No rash or itching.  CARDIOVASCULAR:  No chest pain, chest pressure or chest discomfort. No palpitations or edema.  RESPIRATORY:  No shortness of breath, cough or sputum.  GASTROINTESTINAL:  No anorexia, nausea, vomiting or diarrhea. No abdominal pain or blood.  GENITOURINARY:  No burning on urination or incontinence   NEUROLOGICAL:  No headache, dizziness, syncope, paralysis, ataxia, numbness or tingling in the extremities. No change in bowel or bladder control. no limb weakness. no vision changes.   MUSCULOSKELETAL:  No muscle, back pain, joint pain or stiffness.  HEMATOLOGIC:  No anemia, bleeding or bruising.  LYMPHATICS:  No enlarged nodes. No history of splenectomy.  PSYCHIATRIC:  No history of depression or anxiety.  ENDOCRINOLOGIC:  No reports of sweating, cold or heat intolerance. No polyuria or polydipsia.      Vitals:  Vital Signs Last 24 Hrs  T(C): 36.3 (21 Aug 2024 10:42), Max: 37.1 (20 Aug 2024 20:25)  T(F): 97.4 (21 Aug 2024 10:42), Max: 98.7 (20 Aug 2024 20:25)  HR: 70 (21 Aug 2024 10:42) (70 - 88)  BP: 161/64 (21 Aug 2024 10:42) (136/70 - 184/78)  BP(mean): --  RR: 17 (21 Aug 2024 10:42) (17 - 19)  SpO2: 100% (21 Aug 2024 10:42) (99% - 100%)    Parameters below as of 21 Aug 2024 10:42  Patient On (Oxygen Delivery Method): room air        General Exam:   General Appearance: Appropriately dressed and in no acute distress       Head: Normocephalic, atraumatic and no dysmorphic features  Ear, Nose, and Throat: Moist mucous membranes  CVS: S1S2+  Resp: No SOB, no wheeze or rhonchi  GI: soft NT/ND  Extremities: No edema or cyanosis  Skin: No bruises or rashes     Neurological Exam:  Mental Status: Awake, alert and oriented x 3.  Able to follow simple and complex verbal commands. Able to name and repeat. fluent speech. No obvious aphasia or dysarthria noted.   Cranial Nerves: PERRL, EOMI, VFFC, sensation V1-V3 intact,  L NLF,  equal elevation of palate, scm/trap 5/5, tongue is midline on protrusion.  hearing is grossly intact.   Motor: LUE and LLE drift   Sensation: dec on the L    Reflexes: 1+ throughout at biceps, brachioradialis, triceps, patellars and ankles bilaterally and equal. No clonus. R toe and L toe were both downgoing.  Coordination: No dysmetria on FNF  Gait: deferred    Data/Labs/Imaging which I personally reviewed.     Labs:     CBC Full  -  ( 21 Aug 2024 06:01 )  WBC Count : 11.65 K/uL  RBC Count : 4.30 M/uL  Hemoglobin : 12.0 g/dL  Hematocrit : 35.8 %  Platelet Count - Automated : 389 K/uL  Mean Cell Volume : 83.3 fL  Mean Cell Hemoglobin : 27.9 pg  Mean Cell Hemoglobin Concentration : 33.5 gm/dL  Auto Neutrophil # : 7.87 K/uL  Auto Lymphocyte # : 2.39 K/uL  Auto Monocyte # : 1.10 K/uL  Auto Eosinophil # : 0.17 K/uL  Auto Basophil # : 0.06 K/uL  Auto Neutrophil % : 67.6 %  Auto Lymphocyte % : 20.5 %  Auto Monocyte % : 9.4 %  Auto Eosinophil % : 1.5 %  Auto Basophil % : 0.5 %    08-21    127<L>  |  91<L>  |  17  ----------------------------<  184<H>  4.3   |  22  |  0.80    Ca    9.7      21 Aug 2024 06:01  Phos  3.4     08-21  Mg     1.30     08-21          Urinalysis Basic - ( 21 Aug 2024 06:01 )    Color: x / Appearance: x / SG: x / pH: x  Gluc: 184 mg/dL / Ketone: x  / Bili: x / Urobili: x   Blood: x / Protein: x / Nitrite: x   Leuk Esterase: x / RBC: x / WBC x   Sq Epi: x / Non Sq Epi: x / Bacteria: x        < from: CT Brain Stroke Protocol (24 @ 14:51) >    ACC: [id number] EXAM:  CT BRAIN STROKE PROTOCOL   ORDERED BY: SARA SNIDER     PROCEDURE DATE:  2024          INTERPRETATION:  CLINICAL INFORMATION: Code stroke, 10 min episode of   aphasia that's returned to baseline, last known normal at 1030am.  NIH   0    TECHNIQUE: Noncontrast axial CT images were acquired through the head.   Two-dimensional sagittal and coronal reformats were generated.    COMPARISON STUDY: MR brain 2015, CT brain 2024    FINDINGS:    No acute intracranial hemorrhage, mass effect, vasogenic edema, or   evidence of acute territorial infarct.    Ventricle size is proportionate for age.  Mild prominent gyri and foci   particularly of the left frontotemporal region minimally changed from   2024. There is mild patchy white matter hypoattenuation which is   nonspecific in etiology but likely related to chronic microvascular   changes.    The visualized paranasal sinuses are clear. The mastoid air cells and   middle ear cavities are clear.    The globes are unremarkable.    The soft tissues of the scalp are unremarkable. The calvarium is intact.    IMPRESSION:    No CT evidence of acute intracranial hemorrhage, brain edema, or mass   effect. Consider MRI as clinically warranted.    Findings discussed with Dr. Rodriguez from neurology at 2:43 pm on 2024   by YESSY Veloz MD.    < end of copied text >  < from: MRI Head w/wo Cont (01.27.15 @ 19:11) >    IMPRESSION: Evidence of intraventricular hemorrhage layering in the   occipital horns of the lateral ventricles without hydrocephalus. Some   residual subarachnoid hemorrhage in the prepontine and suprasellar   regions as well as some trace what appears to be subarachnoid hemorrhage   in the high left temporal parietal region on the susceptibility and   gradient echo sequences. No definite visualization of a fistula.    < end of copied text >      CONCLUSIONS:      1. Left ventricular systolic function is hyperdynamic with an ejection fraction visually estimated at >75 %.   2. Normal right ventricular cavity size, with normal wall thickness, and normal right ventricular systolic function. Tricuspid annular plane systolic excursion (TAPSE) is 2.1 cm (normal >=1.7 cm).   3. Normal left and right atrial size.   4. No significant valvular disease.   5. No pericardial effusion seen.   6. Hyperdynamic left ventricular systolic function with intra-cavitary gradient of 39 mmHg.   7. The inferior vena cava is normal in size (normal <2.1cm) with normal inspiratory collapse (normal >50%) consistent with normal right atrial pressure (~3, range 0-5mmHg).

## 2024-08-21 NOTE — PROGRESS NOTE ADULT - ASSESSMENT
79 /o F with pmh of TIA (1.5 months ago), DM type 2, HTN, HLD p/w fall.        Problem/Plan - 1:  ·  Problem: Left hip pain.   ·  Plan: Possibly OA.  Improved after tylenol  - tylenol PRN if worsens again.    Problem/Plan - 2:  ·  Problem: Fall.   ·  Plan: - PT eval  - may require increased services at home.    Problem/Plan - 3:  ·  Problem: Generalized weakness.   ·  Plan: 2/2 deconditioning   - PT eval.    Problem/Plan - 4:  ·  Problem: Type 2 diabetes mellitus.   ·  Plan: - hold metformin  - Lispro sliding scale.    Problem/Plan - 5:  ·  Problem: Essential hypertension.   ·  Plan: - Continue home metoprolol  - unclear if pt is taking a second agent.  Will add amlodipine for now  - confirm home meds (See below).    Problem/Plan - 6:  ·  Problem: History of transient ischemic attack (TIA).   ·  Plan: - cw ASA and statin.  neuro fu

## 2024-08-22 LAB
ADD ON TEST-SPECIMEN IN LAB: SIGNIFICANT CHANGE UP
ANION GAP SERPL CALC-SCNC: 15 MMOL/L — HIGH (ref 7–14)
BUN SERPL-MCNC: 20 MG/DL — SIGNIFICANT CHANGE UP (ref 7–23)
CALCIUM SERPL-MCNC: 9.6 MG/DL — SIGNIFICANT CHANGE UP (ref 8.4–10.5)
CHLORIDE SERPL-SCNC: 94 MMOL/L — LOW (ref 98–107)
CHOLEST SERPL-MCNC: 139 MG/DL — SIGNIFICANT CHANGE UP
CO2 SERPL-SCNC: 22 MMOL/L — SIGNIFICANT CHANGE UP (ref 22–31)
CREAT SERPL-MCNC: 0.88 MG/DL — SIGNIFICANT CHANGE UP (ref 0.5–1.3)
EGFR: 67 ML/MIN/1.73M2 — SIGNIFICANT CHANGE UP
GLUCOSE BLDC GLUCOMTR-MCNC: 103 MG/DL — HIGH (ref 70–99)
GLUCOSE BLDC GLUCOMTR-MCNC: 176 MG/DL — HIGH (ref 70–99)
GLUCOSE BLDC GLUCOMTR-MCNC: 191 MG/DL — HIGH (ref 70–99)
GLUCOSE BLDC GLUCOMTR-MCNC: 210 MG/DL — HIGH (ref 70–99)
GLUCOSE SERPL-MCNC: 177 MG/DL — HIGH (ref 70–99)
HCT VFR BLD CALC: 34.7 % — SIGNIFICANT CHANGE UP (ref 34.5–45)
HDLC SERPL-MCNC: 46 MG/DL — LOW
HGB BLD-MCNC: 11.7 G/DL — SIGNIFICANT CHANGE UP (ref 11.5–15.5)
LIPID PNL WITH DIRECT LDL SERPL: 79 MG/DL — SIGNIFICANT CHANGE UP
MAGNESIUM SERPL-MCNC: 1.5 MG/DL — LOW (ref 1.6–2.6)
MCHC RBC-ENTMCNC: 28.6 PG — SIGNIFICANT CHANGE UP (ref 27–34)
MCHC RBC-ENTMCNC: 33.7 GM/DL — SIGNIFICANT CHANGE UP (ref 32–36)
MCV RBC AUTO: 84.8 FL — SIGNIFICANT CHANGE UP (ref 80–100)
NON HDL CHOLESTEROL: 93 MG/DL — SIGNIFICANT CHANGE UP
NRBC # BLD: 0 /100 WBCS — SIGNIFICANT CHANGE UP (ref 0–0)
NRBC # FLD: 0 K/UL — SIGNIFICANT CHANGE UP (ref 0–0)
PHOSPHATE SERPL-MCNC: 3.9 MG/DL — SIGNIFICANT CHANGE UP (ref 2.5–4.5)
PLATELET # BLD AUTO: 373 K/UL — SIGNIFICANT CHANGE UP (ref 150–400)
POTASSIUM SERPL-MCNC: 4.2 MMOL/L — SIGNIFICANT CHANGE UP (ref 3.5–5.3)
POTASSIUM SERPL-SCNC: 4.2 MMOL/L — SIGNIFICANT CHANGE UP (ref 3.5–5.3)
RBC # BLD: 4.09 M/UL — SIGNIFICANT CHANGE UP (ref 3.8–5.2)
RBC # FLD: 12.1 % — SIGNIFICANT CHANGE UP (ref 10.3–14.5)
SODIUM SERPL-SCNC: 131 MMOL/L — LOW (ref 135–145)
TRIGL SERPL-MCNC: 72 MG/DL — SIGNIFICANT CHANGE UP
WBC # BLD: 10.82 K/UL — HIGH (ref 3.8–10.5)
WBC # FLD AUTO: 10.82 K/UL — HIGH (ref 3.8–10.5)

## 2024-08-22 RX ORDER — SODIUM BICARBONATE 84 MG/ML
650 INJECTION, SOLUTION INTRAVENOUS ONCE
Refills: 0 | Status: COMPLETED | OUTPATIENT
Start: 2024-08-22 | End: 2024-08-22

## 2024-08-22 RX ORDER — LIDOCAINE/BENZALKONIUM/ALCOHOL
1 SOLUTION, NON-ORAL TOPICAL ONCE
Refills: 0 | Status: COMPLETED | OUTPATIENT
Start: 2024-08-22 | End: 2024-08-22

## 2024-08-22 RX ORDER — ASPIRIN 81 MG
81 TABLET, DELAYED RELEASE (ENTERIC COATED) ORAL DAILY
Refills: 0 | Status: DISCONTINUED | OUTPATIENT
Start: 2024-08-22 | End: 2024-09-02

## 2024-08-22 RX ADMIN — AMLODIPINE BESYLATE 5 MILLIGRAM(S): 10 TABLET ORAL at 06:14

## 2024-08-22 RX ADMIN — Medication 100 MILLIGRAM(S): at 11:31

## 2024-08-22 RX ADMIN — SODIUM CHLORIDE 1 GRAM(S): 9 INJECTION INTRAMUSCULAR; INTRAVENOUS; SUBCUTANEOUS at 06:13

## 2024-08-22 RX ADMIN — METOPROLOL TARTRATE 100 MILLIGRAM(S): 100 TABLET ORAL at 06:14

## 2024-08-22 RX ADMIN — CHLORHEXIDINE GLUCONATE 1 APPLICATION(S): 40 SOLUTION TOPICAL at 11:34

## 2024-08-22 RX ADMIN — Medication 40 MILLIGRAM(S): at 21:44

## 2024-08-22 RX ADMIN — ACETAMINOPHEN 650 MILLIGRAM(S): 325 TABLET ORAL at 08:00

## 2024-08-22 RX ADMIN — SODIUM BICARBONATE 650 MILLIGRAM(S): 84 INJECTION, SOLUTION INTRAVENOUS at 14:55

## 2024-08-22 RX ADMIN — Medication 81 MILLIGRAM(S): at 11:33

## 2024-08-22 RX ADMIN — Medication 2: at 09:01

## 2024-08-22 RX ADMIN — ACETAMINOPHEN 650 MILLIGRAM(S): 325 TABLET ORAL at 18:14

## 2024-08-22 RX ADMIN — Medication 1: at 12:36

## 2024-08-22 RX ADMIN — Medication 1 PATCH: at 23:19

## 2024-08-22 RX ADMIN — Medication 100 GRAM(S): at 10:47

## 2024-08-22 RX ADMIN — SODIUM BICARBONATE 650 MILLIGRAM(S): 84 INJECTION, SOLUTION INTRAVENOUS at 06:14

## 2024-08-22 RX ADMIN — ACETAMINOPHEN 650 MILLIGRAM(S): 325 TABLET ORAL at 07:07

## 2024-08-22 NOTE — PROGRESS NOTE ADULT - ASSESSMENT
79F with recent TIA (6 weeks ago),  SAH with IVH in 2015, DM, CAD, and HLD presents s/p fall. Pt was walking with assistance out of her home today when she fell slowly to the floor landing on her buttocks. No head trauma. C/o left sided weakness  L hemiparesis on exam, pain limited at L hip  A1C 6.1  TTE EF > 75%, no valvular abnomalities  orthostatics neg     Fall - mechanical ?deconditioning   Recent TIA - now with L hemiparesis , unclear if pain related vs stroke    - aspirin 81 and statin for secondary stroke preventino  - TTE as above  - MRI brain w/o as inpt vs outpt  - check LDL, titrate statin for LDL < 70  - NSAIDs for L hip pain  - PT/OT  - DVT ppx     Evonne Tenorio DO  Vascular Neurology  Office [phone]       79F with recent TIA (6 weeks ago),  SAH with IVH in 2015, DM, CAD, and HLD presents s/p fall. Pt was walking with assistance out of her home today when she fell slowly to the floor landing on her buttocks. No head trauma. C/o left sided weakness  L hemiparesis on exam, pain limited at L hip  A1C 6.1, LDL 79  TTE EF > 75%, no valvular abnomalities  orthostatics neg     Fall - mechanical ?deconditioning   Recent TIA - now with L hemiparesis , unclear if pain related vs stroke    - aspirin 81 and statin for secondary stroke preventino  - TTE as above  - MRI brain w/o as inpt vs outpt  - NSAIDs for L hip pain  - PT/OT  - DVT ppx   dc planning    Evonne Tenorio DO  Vascular Neurology  Office [phone]

## 2024-08-22 NOTE — PROGRESS NOTE ADULT - ASSESSMENT
79 /o F with pmh of TIA (1.5 months ago), DM type 2, HTN, HLD p/w fall.        Problem/Plan - 1:  ·  Problem: Left hip pain.   ·  Plan: Possibly OA.  Improved after tylenol  - tylenol PRN if worsens again.    Problem/Plan - 2:·  Problem: Fall.   ·  Plan: - PT eval  - may require increased services at home.    Problem/Plan - 3:  ·  Problem: hyponatremia  ·  Plan: monitor BMP  Renal fu     Problem/Plan - 4:  ·  Problem: Type 2 diabetes mellitus.   ·  Plan: - hold metformin  - Lispro sliding scale.    Problem/Plan - 5:  ·  Problem: Essential hypertension.   ·  Plan: - Continue home metoprolol  - unclear if pt is taking a second agent.  Will add amlodipine for now  - confirm home meds (See below).    Problem/Plan - 6:  ·  Problem: History of transient ischemic attack (TIA).   ·  Plan: - cw ASA and statin.  neuro fu

## 2024-08-22 NOTE — PROGRESS NOTE ADULT - ASSESSMENT
79-year-old female with PMH TIA 1 and half months ago, DM2, HTN, HLD presents for a fall. nephrology consulted for hyponatremia    Hyponatremia  follows up with Dr. Manzanares in office  seen in 2022 work up at that time was polydipsia  daughter also confirmed patient still drinks alot of water  urine work up likely SIADH  normal tsh, cortisol level  free water restriction <1L/day  given 1liter NS inED  Na better   avoid overcorrection. na should not correct >6-8meq in 24 hrs    htn  acceptable  resume home meds  monitor    acidosis  non ag  bicarb 650 tid x2 days    weakness  work up per team

## 2024-08-22 NOTE — PROGRESS NOTE ADULT - ASSESSMENT
S/p Fall   neg orthostatic vitals  PT eval     murmur  echo shows mid cavitary gradient and aortic calcification     hyponatremia  ? polydipsia  plan as per renal     history of TIA  consider low dose asa  statin

## 2024-08-23 LAB
GLUCOSE BLDC GLUCOMTR-MCNC: 131 MG/DL — HIGH (ref 70–99)
GLUCOSE BLDC GLUCOMTR-MCNC: 177 MG/DL — HIGH (ref 70–99)
GLUCOSE BLDC GLUCOMTR-MCNC: 216 MG/DL — HIGH (ref 70–99)
GLUCOSE BLDC GLUCOMTR-MCNC: 222 MG/DL — HIGH (ref 70–99)
MRSA PCR RESULT.: DETECTED
S AUREUS DNA NOSE QL NAA+PROBE: DETECTED

## 2024-08-23 PROCEDURE — 70551 MRI BRAIN STEM W/O DYE: CPT | Mod: 26

## 2024-08-23 RX ORDER — AMLODIPINE BESYLATE 10 MG/1
10 TABLET ORAL DAILY
Refills: 0 | Status: DISCONTINUED | OUTPATIENT
Start: 2024-08-23 | End: 2024-08-24

## 2024-08-23 RX ORDER — MUPIROCIN 2 %
1 OINTMENT (GRAM) TOPICAL
Refills: 0 | Status: COMPLETED | OUTPATIENT
Start: 2024-08-23 | End: 2024-08-28

## 2024-08-23 RX ADMIN — Medication 2: at 12:57

## 2024-08-23 RX ADMIN — Medication 40 MILLIGRAM(S): at 21:43

## 2024-08-23 RX ADMIN — ACETAMINOPHEN 650 MILLIGRAM(S): 325 TABLET ORAL at 12:07

## 2024-08-23 RX ADMIN — METOPROLOL TARTRATE 100 MILLIGRAM(S): 100 TABLET ORAL at 06:23

## 2024-08-23 RX ADMIN — ACETAMINOPHEN 650 MILLIGRAM(S): 325 TABLET ORAL at 01:30

## 2024-08-23 RX ADMIN — AMLODIPINE BESYLATE 5 MILLIGRAM(S): 10 TABLET ORAL at 06:23

## 2024-08-23 RX ADMIN — Medication 100 MILLIGRAM(S): at 11:37

## 2024-08-23 RX ADMIN — Medication 1 APPLICATION(S): at 19:30

## 2024-08-23 RX ADMIN — ACETAMINOPHEN 650 MILLIGRAM(S): 325 TABLET ORAL at 00:28

## 2024-08-23 RX ADMIN — Medication 81 MILLIGRAM(S): at 11:38

## 2024-08-23 RX ADMIN — Medication 1 PATCH: at 07:20

## 2024-08-23 RX ADMIN — CHLORHEXIDINE GLUCONATE 1 APPLICATION(S): 40 SOLUTION TOPICAL at 11:38

## 2024-08-23 RX ADMIN — ACETAMINOPHEN 650 MILLIGRAM(S): 325 TABLET ORAL at 11:37

## 2024-08-23 RX ADMIN — Medication 2: at 08:57

## 2024-08-23 RX ADMIN — Medication 1 PATCH: at 10:53

## 2024-08-23 NOTE — PROGRESS NOTE ADULT - ASSESSMENT
79F with recent TIA (6 weeks ago),  SAH with IVH in 2015, DM, CAD, and HLD presents s/p fall. Pt was walking with assistance out of her home today when she fell slowly to the floor landing on her buttocks. No head trauma. C/o left sided weakness  L hemiparesis on exam, pain limited at L hip  A1C 6.1, LDL 79  TTE EF > 75%, no valvular abnormalities  orthostatics neg     Fall - mechanical ?deconditioning   Recent TIA - now with L hemiparesis , unclear if pain related vs stroke    - aspirin 81 and statin for secondary stroke prevention  - TTE as above  - Cardiology following   - MRI brain w/o as inpt vs outpt  - NSAIDs for L hip pain  - PT/OT  - DVT ppx   dc planning    Evonne Tenorio DO  Vascular Neurology  Office [phone]

## 2024-08-23 NOTE — PROGRESS NOTE ADULT - ASSESSMENT
79 /o F with pmh of TIA (1.5 months ago), DM type 2, HTN, HLD p/w fall.        Problem/Plan - 1:  ·  Problem: Left hip pain.   ·  Plan: Possibly OA.  Improved after tylenol  - tylenol PRN if worsens again.    Problem/Plan - 2:·  Problem: Fall.   ·  Plan: - PT eval  - may require increased services at home.    Problem/Plan - 3:  ·  Problem: hyponatremia  ·  Plan: monitor BMP  Renal fu     Problem/Plan - 4:  ·  Problem: Type 2 diabetes mellitus.   ·  Plan: - hold metformin  - Lispro sliding scale.    Problem/Plan - 5:  ·  Problem: Essential hypertension.   ·  Plan: - Continue home metoprolol  - unclear if pt is taking a second agent.  Will add amlodipine for now  - confirm home meds (See below).    Problem/Plan - 6:  ·  Problem: History of transient ischemic attack (TIA).   ·  Plan: - cw ASA and statin.  neuro fu       pt medically cleared for dc

## 2024-08-23 NOTE — PROGRESS NOTE ADULT - ASSESSMENT
79-year-old female with PMH TIA 1 and half months ago, DM2, HTN, HLD presents for a fall. nephrology consulted for hyponatremia    Hyponatremia  follows up with Dr. Manzanares in office  seen in 2022 work up at that time was polydipsia  daughter also confirmed patient still drinks alot of water  urine work up likely SIADH  normal tsh, cortisol level  free water restriction <1L/day  given 1liter NS inED  Na better pending new lab  avoid overcorrection. na should not correct >6-8meq in 24 hrs    htn  suboptimal  increase norvasc 10  monitor    acidosis  non ag  bicarb 650 tid x2 days  better    weakness  work up per team

## 2024-08-24 LAB
ANION GAP SERPL CALC-SCNC: 17 MMOL/L — HIGH (ref 7–14)
BASOPHILS # BLD AUTO: 0.07 K/UL — SIGNIFICANT CHANGE UP (ref 0–0.2)
BASOPHILS NFR BLD AUTO: 0.6 % — SIGNIFICANT CHANGE UP (ref 0–2)
BUN SERPL-MCNC: 18 MG/DL — SIGNIFICANT CHANGE UP (ref 7–23)
CALCIUM SERPL-MCNC: 9.5 MG/DL — SIGNIFICANT CHANGE UP (ref 8.4–10.5)
CHLORIDE SERPL-SCNC: 93 MMOL/L — LOW (ref 98–107)
CO2 SERPL-SCNC: 18 MMOL/L — LOW (ref 22–31)
CREAT SERPL-MCNC: 0.67 MG/DL — SIGNIFICANT CHANGE UP (ref 0.5–1.3)
EGFR: 89 ML/MIN/1.73M2 — SIGNIFICANT CHANGE UP
EOSINOPHIL # BLD AUTO: 0.36 K/UL — SIGNIFICANT CHANGE UP (ref 0–0.5)
EOSINOPHIL NFR BLD AUTO: 3.2 % — SIGNIFICANT CHANGE UP (ref 0–6)
GLUCOSE BLDC GLUCOMTR-MCNC: 182 MG/DL — HIGH (ref 70–99)
GLUCOSE BLDC GLUCOMTR-MCNC: 207 MG/DL — HIGH (ref 70–99)
GLUCOSE BLDC GLUCOMTR-MCNC: 213 MG/DL — HIGH (ref 70–99)
GLUCOSE BLDC GLUCOMTR-MCNC: 309 MG/DL — HIGH (ref 70–99)
GLUCOSE SERPL-MCNC: 180 MG/DL — HIGH (ref 70–99)
HCT VFR BLD CALC: 40.7 % — SIGNIFICANT CHANGE UP (ref 34.5–45)
HGB BLD-MCNC: 13.3 G/DL — SIGNIFICANT CHANGE UP (ref 11.5–15.5)
IANC: 7.27 K/UL — SIGNIFICANT CHANGE UP (ref 1.8–7.4)
IMM GRANULOCYTES NFR BLD AUTO: 0.4 % — SIGNIFICANT CHANGE UP (ref 0–0.9)
LYMPHOCYTES # BLD AUTO: 2.57 K/UL — SIGNIFICANT CHANGE UP (ref 1–3.3)
LYMPHOCYTES # BLD AUTO: 22.8 % — SIGNIFICANT CHANGE UP (ref 13–44)
MAGNESIUM SERPL-MCNC: 1.6 MG/DL — SIGNIFICANT CHANGE UP (ref 1.6–2.6)
MCHC RBC-ENTMCNC: 28.3 PG — SIGNIFICANT CHANGE UP (ref 27–34)
MCHC RBC-ENTMCNC: 32.7 GM/DL — SIGNIFICANT CHANGE UP (ref 32–36)
MCV RBC AUTO: 86.6 FL — SIGNIFICANT CHANGE UP (ref 80–100)
MONOCYTES # BLD AUTO: 0.97 K/UL — HIGH (ref 0–0.9)
MONOCYTES NFR BLD AUTO: 8.6 % — SIGNIFICANT CHANGE UP (ref 2–14)
NEUTROPHILS # BLD AUTO: 7.27 K/UL — SIGNIFICANT CHANGE UP (ref 1.8–7.4)
NEUTROPHILS NFR BLD AUTO: 64.4 % — SIGNIFICANT CHANGE UP (ref 43–77)
NRBC # BLD: 0 /100 WBCS — SIGNIFICANT CHANGE UP (ref 0–0)
NRBC # FLD: 0 K/UL — SIGNIFICANT CHANGE UP (ref 0–0)
PHOSPHATE SERPL-MCNC: 3.6 MG/DL — SIGNIFICANT CHANGE UP (ref 2.5–4.5)
PLATELET # BLD AUTO: 320 K/UL — SIGNIFICANT CHANGE UP (ref 150–400)
POTASSIUM SERPL-MCNC: 4.2 MMOL/L — SIGNIFICANT CHANGE UP (ref 3.5–5.3)
POTASSIUM SERPL-SCNC: 4.2 MMOL/L — SIGNIFICANT CHANGE UP (ref 3.5–5.3)
RBC # BLD: 4.7 M/UL — SIGNIFICANT CHANGE UP (ref 3.8–5.2)
RBC # FLD: 12.4 % — SIGNIFICANT CHANGE UP (ref 10.3–14.5)
SODIUM SERPL-SCNC: 128 MMOL/L — LOW (ref 135–145)
WBC # BLD: 11.28 K/UL — HIGH (ref 3.8–10.5)
WBC # FLD AUTO: 11.28 K/UL — HIGH (ref 3.8–10.5)

## 2024-08-24 RX ORDER — PETROLATUM 865 MG/G
1 OINTMENT TOPICAL EVERY 8 HOURS
Refills: 0 | Status: DISCONTINUED | OUTPATIENT
Start: 2024-08-24 | End: 2024-09-02

## 2024-08-24 RX ORDER — FAMOTIDINE 10 MG/ML
20 INJECTION INTRAVENOUS ONCE
Refills: 0 | Status: COMPLETED | OUTPATIENT
Start: 2024-08-24 | End: 2024-08-24

## 2024-08-24 RX ORDER — LIDOCAINE/BENZALKONIUM/ALCOHOL
1 SOLUTION, NON-ORAL TOPICAL EVERY 24 HOURS
Refills: 0 | Status: DISCONTINUED | OUTPATIENT
Start: 2024-08-24 | End: 2024-09-02

## 2024-08-24 RX ORDER — OXYCODONE HYDROCHLORIDE 5 MG/1
2.5 TABLET ORAL EVERY 8 HOURS
Refills: 0 | Status: DISCONTINUED | OUTPATIENT
Start: 2024-08-24 | End: 2024-08-29

## 2024-08-24 RX ORDER — ACETAMINOPHEN 325 MG/1
650 TABLET ORAL EVERY 6 HOURS
Refills: 0 | Status: DISCONTINUED | OUTPATIENT
Start: 2024-08-24 | End: 2024-09-02

## 2024-08-24 RX ORDER — NIFEDIPINE 60 MG/1
90 TABLET, FILM COATED, EXTENDED RELEASE ORAL DAILY
Refills: 0 | Status: DISCONTINUED | OUTPATIENT
Start: 2024-08-24 | End: 2024-09-02

## 2024-08-24 RX ORDER — MAGNESIUM, ALUMINUM HYDROXIDE 200-225/5
30 SUSPENSION, ORAL (FINAL DOSE FORM) ORAL EVERY 4 HOURS
Refills: 0 | Status: DISCONTINUED | OUTPATIENT
Start: 2024-08-24 | End: 2024-09-02

## 2024-08-24 RX ADMIN — Medication 2: at 09:26

## 2024-08-24 RX ADMIN — Medication 100 MILLIGRAM(S): at 13:13

## 2024-08-24 RX ADMIN — Medication 1 PATCH: at 22:16

## 2024-08-24 RX ADMIN — CHLORHEXIDINE GLUCONATE 1 APPLICATION(S): 40 SOLUTION TOPICAL at 13:13

## 2024-08-24 RX ADMIN — Medication 30 MILLILITER(S): at 19:28

## 2024-08-24 RX ADMIN — Medication 81 MILLIGRAM(S): at 13:13

## 2024-08-24 RX ADMIN — AMLODIPINE BESYLATE 10 MILLIGRAM(S): 10 TABLET ORAL at 05:14

## 2024-08-24 RX ADMIN — METOPROLOL TARTRATE 100 MILLIGRAM(S): 100 TABLET ORAL at 05:14

## 2024-08-24 RX ADMIN — Medication 40 MILLIGRAM(S): at 22:18

## 2024-08-24 RX ADMIN — NIFEDIPINE 90 MILLIGRAM(S): 60 TABLET, FILM COATED, EXTENDED RELEASE ORAL at 07:57

## 2024-08-24 RX ADMIN — Medication 1 APPLICATION(S): at 17:46

## 2024-08-24 RX ADMIN — Medication 1 APPLICATION(S): at 05:14

## 2024-08-24 RX ADMIN — Medication 4: at 13:13

## 2024-08-24 RX ADMIN — PETROLATUM 1 APPLICATION(S): 865 OINTMENT TOPICAL at 13:50

## 2024-08-24 RX ADMIN — FAMOTIDINE 20 MILLIGRAM(S): 10 INJECTION INTRAVENOUS at 19:28

## 2024-08-24 RX ADMIN — Medication 1: at 17:46

## 2024-08-24 NOTE — PROGRESS NOTE ADULT - ASSESSMENT
79-year-old female with PMH TIA 1 and half months ago, DM2, HTN, HLD presents for a fall. nephrology consulted for hyponatremia    Hyponatremia  follows up with Dr. Manzanares in office  seen in 2022 work up at that time was polydipsia  daughter also confirmed patient still drinks alot of water  urine work up likely SIADH  normal tsh, cortisol level  free water restriction <1L/day, reiterated to patient today  given 1liter NS inED  Na downtrending, will consider salt tabs if not improving (However not preferrable given Hypotension)  avoid overcorrection. na should not correct >6-8meq in 24 hrs    htn  suboptimal  Started on Nifedipine 90 XL. uptitrate as necessary  Consider ACE/ARB as second line agent if still not controlled  monitor    acidosis  non ag  bicarb 650 tid x2 days  better    weakness  work up per team 79-year-old female with PMH TIA 1 and half months ago, DM2, HTN, HLD presents for a fall. nephrology consulted for hyponatremia    Hyponatremia  follows up with Dr. Manzanares in office  seen in 2022 work up at that time was polydipsia  daughter also confirmed patient still drinks alot of water  urine work up likely SIADH  normal tsh, cortisol level  free water restriction <1L/day, reiterated to patient today  given 1liter NS inED  Na downtrending, will consider salt tabs if not improving (However not preferable given Hypertension)  avoid overcorrection. na should not correct >6-8meq in 24 hrs    htn  suboptimal  Started on Nifedipine 90 XL. uptitrate as necessary  Consider ACE/ARB as second line agent if still not controlled  monitor    acidosis  non ag  bicarb 650 tid x2 days  better    weakness  work up per team

## 2024-08-24 NOTE — PROGRESS NOTE ADULT - ASSESSMENT
S/p Fall   neg orthostatic vitals  PT eval     murmur  echo shows mid cavitary gradient and aortic calcification   cont BB    HTN  change amlodipine to nifedipine     hyponatremia  ? polydipsia  plan as per renal     history of TIA  consider low dose asa  statin

## 2024-08-25 LAB
ANION GAP SERPL CALC-SCNC: 13 MMOL/L — SIGNIFICANT CHANGE UP (ref 7–14)
BASOPHILS # BLD AUTO: 0.07 K/UL — SIGNIFICANT CHANGE UP (ref 0–0.2)
BASOPHILS NFR BLD AUTO: 0.5 % — SIGNIFICANT CHANGE UP (ref 0–2)
BUN SERPL-MCNC: 29 MG/DL — HIGH (ref 7–23)
CALCIUM SERPL-MCNC: 9.5 MG/DL — SIGNIFICANT CHANGE UP (ref 8.4–10.5)
CHLORIDE SERPL-SCNC: 91 MMOL/L — LOW (ref 98–107)
CO2 SERPL-SCNC: 24 MMOL/L — SIGNIFICANT CHANGE UP (ref 22–31)
CREAT SERPL-MCNC: 1.01 MG/DL — SIGNIFICANT CHANGE UP (ref 0.5–1.3)
EGFR: 57 ML/MIN/1.73M2 — LOW
EOSINOPHIL # BLD AUTO: 0.38 K/UL — SIGNIFICANT CHANGE UP (ref 0–0.5)
EOSINOPHIL NFR BLD AUTO: 2.9 % — SIGNIFICANT CHANGE UP (ref 0–6)
GLUCOSE BLDC GLUCOMTR-MCNC: 253 MG/DL — HIGH (ref 70–99)
GLUCOSE BLDC GLUCOMTR-MCNC: 275 MG/DL — HIGH (ref 70–99)
GLUCOSE BLDC GLUCOMTR-MCNC: 296 MG/DL — HIGH (ref 70–99)
GLUCOSE BLDC GLUCOMTR-MCNC: 312 MG/DL — HIGH (ref 70–99)
GLUCOSE SERPL-MCNC: 235 MG/DL — HIGH (ref 70–99)
HCT VFR BLD CALC: 38.4 % — SIGNIFICANT CHANGE UP (ref 34.5–45)
HGB BLD-MCNC: 12.7 G/DL — SIGNIFICANT CHANGE UP (ref 11.5–15.5)
IANC: 8.36 K/UL — HIGH (ref 1.8–7.4)
IMM GRANULOCYTES NFR BLD AUTO: 0.5 % — SIGNIFICANT CHANGE UP (ref 0–0.9)
LYMPHOCYTES # BLD AUTO: 2.98 K/UL — SIGNIFICANT CHANGE UP (ref 1–3.3)
LYMPHOCYTES # BLD AUTO: 22.8 % — SIGNIFICANT CHANGE UP (ref 13–44)
MAGNESIUM SERPL-MCNC: 1.6 MG/DL — SIGNIFICANT CHANGE UP (ref 1.6–2.6)
MCHC RBC-ENTMCNC: 27.9 PG — SIGNIFICANT CHANGE UP (ref 27–34)
MCHC RBC-ENTMCNC: 33.1 GM/DL — SIGNIFICANT CHANGE UP (ref 32–36)
MCV RBC AUTO: 84.4 FL — SIGNIFICANT CHANGE UP (ref 80–100)
MONOCYTES # BLD AUTO: 1.2 K/UL — HIGH (ref 0–0.9)
MONOCYTES NFR BLD AUTO: 9.2 % — SIGNIFICANT CHANGE UP (ref 2–14)
NEUTROPHILS # BLD AUTO: 8.36 K/UL — HIGH (ref 1.8–7.4)
NEUTROPHILS NFR BLD AUTO: 64.1 % — SIGNIFICANT CHANGE UP (ref 43–77)
NRBC # BLD: 0 /100 WBCS — SIGNIFICANT CHANGE UP (ref 0–0)
NRBC # FLD: 0 K/UL — SIGNIFICANT CHANGE UP (ref 0–0)
PHOSPHATE SERPL-MCNC: 3.6 MG/DL — SIGNIFICANT CHANGE UP (ref 2.5–4.5)
PLATELET # BLD AUTO: 408 K/UL — HIGH (ref 150–400)
POTASSIUM SERPL-MCNC: 4.1 MMOL/L — SIGNIFICANT CHANGE UP (ref 3.5–5.3)
POTASSIUM SERPL-SCNC: 4.1 MMOL/L — SIGNIFICANT CHANGE UP (ref 3.5–5.3)
RBC # BLD: 4.55 M/UL — SIGNIFICANT CHANGE UP (ref 3.8–5.2)
RBC # FLD: 12.3 % — SIGNIFICANT CHANGE UP (ref 10.3–14.5)
SODIUM SERPL-SCNC: 128 MMOL/L — LOW (ref 135–145)
WBC # BLD: 13.05 K/UL — HIGH (ref 3.8–10.5)
WBC # FLD AUTO: 13.05 K/UL — HIGH (ref 3.8–10.5)

## 2024-08-25 RX ORDER — LISINOPRIL 10 MG/1
10 TABLET ORAL DAILY
Refills: 0 | Status: DISCONTINUED | OUTPATIENT
Start: 2024-08-25 | End: 2024-08-26

## 2024-08-25 RX ORDER — POLYETHYLENE GLYCOL 3350 17 G/17G
17 POWDER, FOR SOLUTION ORAL DAILY
Refills: 0 | Status: DISCONTINUED | OUTPATIENT
Start: 2024-08-25 | End: 2024-09-01

## 2024-08-25 RX ADMIN — Medication 30 MILLILITER(S): at 18:13

## 2024-08-25 RX ADMIN — Medication 3: at 18:13

## 2024-08-25 RX ADMIN — Medication 40 MILLIGRAM(S): at 21:26

## 2024-08-25 RX ADMIN — Medication 1 PATCH: at 21:26

## 2024-08-25 RX ADMIN — Medication 1 PATCH: at 07:57

## 2024-08-25 RX ADMIN — NIFEDIPINE 90 MILLIGRAM(S): 60 TABLET, FILM COATED, EXTENDED RELEASE ORAL at 05:16

## 2024-08-25 RX ADMIN — Medication 2: at 23:03

## 2024-08-25 RX ADMIN — METOPROLOL TARTRATE 100 MILLIGRAM(S): 100 TABLET ORAL at 05:16

## 2024-08-25 RX ADMIN — LISINOPRIL 10 MILLIGRAM(S): 10 TABLET ORAL at 18:16

## 2024-08-25 RX ADMIN — Medication 3: at 13:50

## 2024-08-25 RX ADMIN — Medication 1 APPLICATION(S): at 18:15

## 2024-08-25 RX ADMIN — Medication 3: at 09:28

## 2024-08-25 RX ADMIN — Medication 1 PATCH: at 10:00

## 2024-08-25 RX ADMIN — CHLORHEXIDINE GLUCONATE 1 APPLICATION(S): 40 SOLUTION TOPICAL at 12:48

## 2024-08-25 RX ADMIN — Medication 81 MILLIGRAM(S): at 12:44

## 2024-08-25 RX ADMIN — Medication 100 MILLIGRAM(S): at 12:44

## 2024-08-25 RX ADMIN — Medication 1 APPLICATION(S): at 05:17

## 2024-08-25 RX ADMIN — POLYETHYLENE GLYCOL 3350 17 GRAM(S): 17 POWDER, FOR SOLUTION ORAL at 13:54

## 2024-08-25 NOTE — PROGRESS NOTE ADULT - ASSESSMENT
79-year-old female with PMH TIA 1 and half months ago, DM2, HTN, HLD presents for a fall. nephrology consulted for hyponatremia    Hyponatremia  Hyponatremia improving corrected for Hyperglycemia is 131-132  follows up with Dr. Manzanares in office  seen in 2022 work up at that time was polydipsia  daughter also confirmed patient still drinks alot of water  urine work up likely SIADH  normal tsh, cortisol level  free water restriction <1L/day, reiterated to patient today as well  given 1liter NS in ED   avoid overcorrection. na should not correct >6-8meq in 24 hrs      htn  suboptimal  Started on Nifedipine 90 XL. uptitrate as necessary  Consider ACE/ARB as second line agent if still not controlled  monitor    acidosis  non ag  bicarb 650 tid x2 days  better    weakness  work up per team

## 2024-08-26 LAB
ANION GAP SERPL CALC-SCNC: 17 MMOL/L — HIGH (ref 7–14)
ANION GAP SERPL CALC-SCNC: 17 MMOL/L — HIGH (ref 7–14)
BASOPHILS # BLD AUTO: 0 K/UL — SIGNIFICANT CHANGE UP (ref 0–0.2)
BASOPHILS NFR BLD AUTO: 0 % — SIGNIFICANT CHANGE UP (ref 0–2)
BUN SERPL-MCNC: 38 MG/DL — HIGH (ref 7–23)
BUN SERPL-MCNC: 39 MG/DL — HIGH (ref 7–23)
CALCIUM SERPL-MCNC: 10.5 MG/DL — SIGNIFICANT CHANGE UP (ref 8.4–10.5)
CALCIUM SERPL-MCNC: 9.7 MG/DL — SIGNIFICANT CHANGE UP (ref 8.4–10.5)
CHLORIDE SERPL-SCNC: 87 MMOL/L — LOW (ref 98–107)
CHLORIDE SERPL-SCNC: 89 MMOL/L — LOW (ref 98–107)
CK SERPL-CCNC: 35 U/L — SIGNIFICANT CHANGE UP (ref 25–170)
CO2 SERPL-SCNC: 19 MMOL/L — LOW (ref 22–31)
CO2 SERPL-SCNC: 22 MMOL/L — SIGNIFICANT CHANGE UP (ref 22–31)
CREAT SERPL-MCNC: 1.01 MG/DL — SIGNIFICANT CHANGE UP (ref 0.5–1.3)
CREAT SERPL-MCNC: 1.02 MG/DL — SIGNIFICANT CHANGE UP (ref 0.5–1.3)
DACRYOCYTES BLD QL SMEAR: SLIGHT — SIGNIFICANT CHANGE UP
EGFR: 56 ML/MIN/1.73M2 — LOW
EGFR: 57 ML/MIN/1.73M2 — LOW
EOSINOPHIL # BLD AUTO: 0 K/UL — SIGNIFICANT CHANGE UP (ref 0–0.5)
EOSINOPHIL NFR BLD AUTO: 0 % — SIGNIFICANT CHANGE UP (ref 0–6)
GLUCOSE BLDC GLUCOMTR-MCNC: 192 MG/DL — HIGH (ref 70–99)
GLUCOSE BLDC GLUCOMTR-MCNC: 306 MG/DL — HIGH (ref 70–99)
GLUCOSE BLDC GLUCOMTR-MCNC: 306 MG/DL — HIGH (ref 70–99)
GLUCOSE BLDC GLUCOMTR-MCNC: 335 MG/DL — HIGH (ref 70–99)
GLUCOSE BLDC GLUCOMTR-MCNC: 359 MG/DL — HIGH (ref 70–99)
GLUCOSE SERPL-MCNC: 231 MG/DL — HIGH (ref 70–99)
GLUCOSE SERPL-MCNC: 320 MG/DL — HIGH (ref 70–99)
HCT VFR BLD CALC: 39 % — SIGNIFICANT CHANGE UP (ref 34.5–45)
HCT VFR BLD CALC: 43.3 % — SIGNIFICANT CHANGE UP (ref 34.5–45)
HGB BLD-MCNC: 13.1 G/DL — SIGNIFICANT CHANGE UP (ref 11.5–15.5)
HGB BLD-MCNC: 14.3 G/DL — SIGNIFICANT CHANGE UP (ref 11.5–15.5)
IANC: 20.66 K/UL — HIGH (ref 1.8–7.4)
LYMPHOCYTES # BLD AUTO: 0.7 K/UL — LOW (ref 1–3.3)
LYMPHOCYTES # BLD AUTO: 3 % — LOW (ref 13–44)
MAGNESIUM SERPL-MCNC: 2 MG/DL — SIGNIFICANT CHANGE UP (ref 1.6–2.6)
MAGNESIUM SERPL-MCNC: 2.3 MG/DL — SIGNIFICANT CHANGE UP (ref 1.6–2.6)
MANUAL SMEAR VERIFICATION: SIGNIFICANT CHANGE UP
MCHC RBC-ENTMCNC: 27.6 PG — SIGNIFICANT CHANGE UP (ref 27–34)
MCHC RBC-ENTMCNC: 28.7 PG — SIGNIFICANT CHANGE UP (ref 27–34)
MCHC RBC-ENTMCNC: 33 GM/DL — SIGNIFICANT CHANGE UP (ref 32–36)
MCHC RBC-ENTMCNC: 33.6 GM/DL — SIGNIFICANT CHANGE UP (ref 32–36)
MCV RBC AUTO: 83.4 FL — SIGNIFICANT CHANGE UP (ref 80–100)
MCV RBC AUTO: 85.3 FL — SIGNIFICANT CHANGE UP (ref 80–100)
MONOCYTES # BLD AUTO: 0.7 K/UL — SIGNIFICANT CHANGE UP (ref 0–0.9)
MONOCYTES NFR BLD AUTO: 3 % — SIGNIFICANT CHANGE UP (ref 2–14)
NEUTROPHILS # BLD AUTO: 21.97 K/UL — HIGH (ref 1.8–7.4)
NEUTROPHILS NFR BLD AUTO: 94 % — HIGH (ref 43–77)
NRBC # BLD: 0 /100 WBCS — SIGNIFICANT CHANGE UP (ref 0–0)
NRBC # BLD: 0 /100 WBCS — SIGNIFICANT CHANGE UP (ref 0–0)
NRBC # FLD: 0 K/UL — SIGNIFICANT CHANGE UP (ref 0–0)
PHOSPHATE SERPL-MCNC: 3.1 MG/DL — SIGNIFICANT CHANGE UP (ref 2.5–4.5)
PHOSPHATE SERPL-MCNC: 3.3 MG/DL — SIGNIFICANT CHANGE UP (ref 2.5–4.5)
PLAT MORPH BLD: NORMAL — SIGNIFICANT CHANGE UP
PLATELET # BLD AUTO: 379 K/UL — SIGNIFICANT CHANGE UP (ref 150–400)
PLATELET # BLD AUTO: 391 K/UL — SIGNIFICANT CHANGE UP (ref 150–400)
POTASSIUM SERPL-MCNC: 4.9 MMOL/L — SIGNIFICANT CHANGE UP (ref 3.5–5.3)
POTASSIUM SERPL-MCNC: 4.9 MMOL/L — SIGNIFICANT CHANGE UP (ref 3.5–5.3)
POTASSIUM SERPL-SCNC: 4.9 MMOL/L — SIGNIFICANT CHANGE UP (ref 3.5–5.3)
POTASSIUM SERPL-SCNC: 4.9 MMOL/L — SIGNIFICANT CHANGE UP (ref 3.5–5.3)
RBC # BLD: 4.57 M/UL — SIGNIFICANT CHANGE UP (ref 3.8–5.2)
RBC # BLD: 5.19 M/UL — SIGNIFICANT CHANGE UP (ref 3.8–5.2)
RBC # FLD: 12.1 % — SIGNIFICANT CHANGE UP (ref 10.3–14.5)
RBC # FLD: 12.3 % — SIGNIFICANT CHANGE UP (ref 10.3–14.5)
RBC BLD AUTO: NORMAL — SIGNIFICANT CHANGE UP
SODIUM SERPL-SCNC: 123 MMOL/L — LOW (ref 135–145)
SODIUM SERPL-SCNC: 128 MMOL/L — LOW (ref 135–145)
WBC # BLD: 21.65 K/UL — HIGH (ref 3.8–10.5)
WBC # BLD: 23.37 K/UL — HIGH (ref 3.8–10.5)
WBC # FLD AUTO: 21.65 K/UL — HIGH (ref 3.8–10.5)
WBC # FLD AUTO: 23.37 K/UL — HIGH (ref 3.8–10.5)

## 2024-08-26 PROCEDURE — 70450 CT HEAD/BRAIN W/O DYE: CPT | Mod: 26

## 2024-08-26 PROCEDURE — 73130 X-RAY EXAM OF HAND: CPT | Mod: 26,LT

## 2024-08-26 PROCEDURE — 73080 X-RAY EXAM OF ELBOW: CPT | Mod: 26,LT

## 2024-08-26 PROCEDURE — 73522 X-RAY EXAM HIPS BI 3-4 VIEWS: CPT | Mod: 26

## 2024-08-26 PROCEDURE — 73030 X-RAY EXAM OF SHOULDER: CPT | Mod: 26,LT

## 2024-08-26 RX ORDER — INSULIN GLARGINE 100 [IU]/ML
8 INJECTION, SOLUTION SUBCUTANEOUS AT BEDTIME
Refills: 0 | Status: DISCONTINUED | OUTPATIENT
Start: 2024-08-26 | End: 2024-08-27

## 2024-08-26 RX ORDER — SODIUM CHLORIDE 3 G/100ML
500 INJECTION, SOLUTION INTRAVENOUS
Refills: 0 | Status: DISCONTINUED | OUTPATIENT
Start: 2024-08-26 | End: 2024-08-26

## 2024-08-26 RX ORDER — LISINOPRIL 10 MG/1
20 TABLET ORAL DAILY
Refills: 0 | Status: DISCONTINUED | OUTPATIENT
Start: 2024-08-26 | End: 2024-09-02

## 2024-08-26 RX ORDER — SODIUM CHLORIDE 9 MG/ML
1 INJECTION INTRAMUSCULAR; INTRAVENOUS; SUBCUTANEOUS THREE TIMES A DAY
Refills: 0 | Status: DISCONTINUED | OUTPATIENT
Start: 2024-08-26 | End: 2024-08-28

## 2024-08-26 RX ADMIN — Medication 1 APPLICATION(S): at 07:34

## 2024-08-26 RX ADMIN — Medication 2: at 22:28

## 2024-08-26 RX ADMIN — Medication 3 UNIT(S): at 18:29

## 2024-08-26 RX ADMIN — Medication 1 PATCH: at 22:27

## 2024-08-26 RX ADMIN — Medication 1: at 18:29

## 2024-08-26 RX ADMIN — Medication 100 MILLIGRAM(S): at 13:24

## 2024-08-26 RX ADMIN — INSULIN GLARGINE 8 UNIT(S): 100 INJECTION, SOLUTION SUBCUTANEOUS at 22:25

## 2024-08-26 RX ADMIN — LISINOPRIL 10 MILLIGRAM(S): 10 TABLET ORAL at 07:34

## 2024-08-26 RX ADMIN — Medication 1 PATCH: at 08:56

## 2024-08-26 RX ADMIN — Medication 5: at 13:25

## 2024-08-26 RX ADMIN — Medication 1 APPLICATION(S): at 18:34

## 2024-08-26 RX ADMIN — Medication 40 MILLIGRAM(S): at 22:26

## 2024-08-26 RX ADMIN — Medication 3 UNIT(S): at 13:25

## 2024-08-26 RX ADMIN — Medication 81 MILLIGRAM(S): at 13:24

## 2024-08-26 RX ADMIN — CHLORHEXIDINE GLUCONATE 1 APPLICATION(S): 40 SOLUTION TOPICAL at 13:25

## 2024-08-26 RX ADMIN — METOPROLOL TARTRATE 100 MILLIGRAM(S): 100 TABLET ORAL at 07:34

## 2024-08-26 RX ADMIN — LISINOPRIL 20 MILLIGRAM(S): 10 TABLET ORAL at 22:26

## 2024-08-26 RX ADMIN — NIFEDIPINE 90 MILLIGRAM(S): 60 TABLET, FILM COATED, EXTENDED RELEASE ORAL at 07:34

## 2024-08-26 RX ADMIN — Medication 4: at 09:31

## 2024-08-26 RX ADMIN — SODIUM CHLORIDE 1 GRAM(S): 9 INJECTION INTRAMUSCULAR; INTRAVENOUS; SUBCUTANEOUS at 23:00

## 2024-08-26 NOTE — DIETITIAN INITIAL EVALUATION ADULT - OTHER INFO
Patient is receiving a PO diet order. Reports poor PO intake with poor appetite due to pain. Noted mostly 50-75% meal completion in house per RN flowsheet. No specific food and fluid preferences discussed. Denies difficulty chewing or swallowing of current diet at this time. No nausea, vomit, diarrhea, constipation. Pt is currently on bowel regimen, no last BM reported per RN flowsheets. No edema, no pressure injury per RN flowsheets. Labs notable for hyponatremia 123 (s/p 1 L NS, NaHCO3 TID, and NaCl tab BID), elevated BUN 38. DM2 on insulin regimen, consistent carb diet, and POCT 182-312mg/dl, with good glycemic control from Hgb A1c@ 6.1% (8/20). Dosing weight is 72.6kg (8/19), repeated wt @ 78.5kg (8/22) per RN flowsheet, height as 160cm, BMI calculated at 30.6- obesity status, appeared to be at baseline from appearance. Patient noted a 11.2kg/24.6lbs/12.5% weight loss in 3 month per [facility] weight record, possibly related to decreased PO intake s/p TIA ~ 1.5 month ago per chart review. Nutrition education deferred at this time 2/2 patient has been in pain and discomfort when RD visited. RD to remain available for further nutrition intervention as indicated.

## 2024-08-26 NOTE — DIETITIAN INITIAL EVALUATION ADULT - NS FNS REASON FOR WEIGHT CHANG
Of note, Patient had TIA ~1.5 month ago.    [facility] weight:   6/9/24 - 83.9kg/82.3kg  5/29/24 - 89.8kg / -11.3kg/24.8 lbs/12.5% x 3 month  8/1/23 - 89.8kg/decreased po intake

## 2024-08-26 NOTE — DIETITIAN INITIAL EVALUATION ADULT - SIGNS/SYMPTOMS
as evidenced by a clinical significant 11.3kg/24.8 lbs/12.6 % weight loss in 3 month as evidenced by persistently elevated POCT 182-312mg/dl (8/24-8/26) in house.

## 2024-08-26 NOTE — DIETITIAN INITIAL EVALUATION ADULT - ADD RECOMMEND
1. Continue present PO diet order, remains appropriate at this time.   2. Recommend provision of Glucerna Therapeutic Nutrition 8 oz. 1x/day (220 kcal, 10gm protein daily).  3. Nursing to document PO in RN flow sheets and monitor weekly weights.   4. Continue to monitor skin integrity, bowel regimen, and nutrition pertinent labs.

## 2024-08-26 NOTE — DIETITIAN INITIAL EVALUATION ADULT - PERTINENT MEDS FT
MEDICATIONS  (STANDING):  aspirin  chewable 81 milliGRAM(s) Oral daily  atorvastatin 40 milliGRAM(s) Oral at bedtime  chlorhexidine 2% Cloths 1 Application(s) Topical daily  dextrose 5%. 1000 milliLiter(s) (50 mL/Hr) IV Continuous <Continuous>  dextrose 5%. 1000 milliLiter(s) (100 mL/Hr) IV Continuous <Continuous>  dextrose 50% Injectable 25 Gram(s) IV Push once  dextrose 50% Injectable 25 Gram(s) IV Push once  dextrose 50% Injectable 12.5 Gram(s) IV Push once  gabapentin 100 milliGRAM(s) Oral daily  glucagon  Injectable 1 milliGRAM(s) IntraMuscular once  insulin glargine Injectable (LANTUS) 8 Unit(s) SubCutaneous at bedtime  insulin lispro (ADMELOG) corrective regimen sliding scale   SubCutaneous three times a day before meals  insulin lispro (ADMELOG) corrective regimen sliding scale   SubCutaneous at bedtime  insulin lispro Injectable (ADMELOG) 3 Unit(s) SubCutaneous three times a day before meals  lidocaine   4% Patch 1 Patch Transdermal every 24 hours  lisinopril 20 milliGRAM(s) Oral daily  metoprolol succinate  milliGRAM(s) Oral daily  mupirocin 2% Ointment 1 Application(s) Topical two times a day  NIFEdipine XL 90 milliGRAM(s) Oral daily  polyethylene glycol 3350 17 Gram(s) Oral daily  sodium chloride 1.5%. 500 milliLiter(s) (50 mL/Hr) IV Continuous <Continuous>    MEDICATIONS  (PRN):  acetaminophen     Tablet .. 650 milliGRAM(s) Oral every 6 hours PRN Temp greater or equal to 38C (100.4F), Mild Pain (1 - 3), Moderate Pain (4 - 6)  aluminum hydroxide/magnesium hydroxide/simethicone Suspension 30 milliLiter(s) Oral every 4 hours PRN Dyspepsia  dextrose Oral Gel 15 Gram(s) Oral once PRN Blood Glucose LESS THAN 70 milliGRAM(s)/deciliter  oxyCODONE    IR 2.5 milliGRAM(s) Oral every 8 hours PRN Severe Pain (7 - 10)  petrolatum white Ointment 1 Application(s) Topical every 8 hours PRN chapped lips

## 2024-08-26 NOTE — PROGRESS NOTE ADULT - ASSESSMENT
79 /o F with pmh of TIA (1.5 months ago), DM type 2, HTN, HLD p/w fall.        # Left hip pain 2/2 OA  improved with tylenol   c/w tylenol     # S/P fall/ found on floor   -s/p CT head : neg for any ac changes   -fall precaution    # hyponatremia , hx of polydipsia   sod 123 : worsen   corrected sod 128 ( 2/2 hyperglycemia)  started on 1.5% NaCl  renal following   f/u recommendation     # Type 2 diabetes mellitus.   ·  Plan: - hold metformin  FSBS are high   - Lispro sliding scale.  add lantus 6 units q HS     # Essential hypertension.   on lisinopril 10 mg , increase to 20 mg   c/w procardia XL and Toprol XL     # History of transient ischemic attack (TIA).   ·  Plan: - cw ASA and statin.  neuro fu     dispo: add lantus 6 units at night , monitor sod level in am     newton charles MD   covering Dr. Mason Murphy

## 2024-08-26 NOTE — PROGRESS NOTE ADULT - ASSESSMENT
S/p Fall   neg orthostatic vitals  PT eval     murmur  echo shows mid cavitary gradient and aortic calcification   cont BB    HTN  cont current meds   stable     hyponatremia  plan as per renal     history of TIA  asa  statin

## 2024-08-26 NOTE — DIETITIAN INITIAL EVALUATION ADULT - EDUCATION DIETARY MODIFICATIONS
Education deferred at this time due to decreased cognition./not applicable/unable to verbalize/demonstrate

## 2024-08-26 NOTE — PROGRESS NOTE ADULT - ASSESSMENT
79F with recent TIA (6 weeks ago),  SAH with IVH in 2015, DM, CAD, and HLD presents s/p fall. Pt was walking with assistance out of her home today when she fell slowly to the floor landing on her buttocks. No head trauma. C/o left sided weakness  L hemiparesis on exam, pain limited at L hip  A1C 6.1, LDL 79  TTE EF > 75%, no valvular abnormalities  orthostatics neg   s/p fall 8./26 AM, repeat CTh neg    Fall - mechanical ?deconditioning   Recent TIA - now with L hemiparesis , unclear if pain related vs stroke    - aspirin 81 and statin for secondary stroke prevention  - TTE as above  - Cardiology following   - MRI brain w/o as inpt vs outpt  - NSAIDs for L hip pain  - Nephro following for hyponatremia - ?SIAD  - infectious w/u for worsening leukocytosis  - PT/OT  - DVT ppx       Evonne Tenorio, DO  Vascular Neurology  Office [phone]

## 2024-08-26 NOTE — DIETITIAN INITIAL EVALUATION ADULT - PROBLEM SELECTOR PLAN 5
Detail Level: Detailed Detail Level: Zone - Continue home metoprolol  - unclear if pt is taking a second agent.  Will add amlodipine for now  - confirm home meds (See below)

## 2024-08-26 NOTE — DIETITIAN INITIAL EVALUATION ADULT - PERTINENT LABORATORY DATA
08-26    123<L>  |  87<L>  |  38<H>  ----------------------------<  320<H>  4.9   |  19<L>  |  1.01    Ca    9.7      26 Aug 2024 05:41  Phos  3.1     08-26  Mg     2.00     08-26    POCT Blood Glucose.: 359 mg/dL (08-26-24 @ 12:42)  A1C with Estimated Average Glucose Result: 6.1 % (08-20-24 @ 07:00)

## 2024-08-26 NOTE — DIETITIAN INITIAL EVALUATION ADULT - REASON FOR ADMISSION
Pain of left hip  Per chart review, Patient is a 79 /o F with pmh of TIA (1.5 months ago), DM type 2, HTN, HLD p/w fall.

## 2024-08-26 NOTE — CHART NOTE - NSCHARTNOTEFT_GEN_A_CORE
code fall called for pt. pt found on floor  by nursing staff s/p unwitnessed fall. as per nursing pt at baseline mental status. pt has been A&Ox2 during admission per nursing staff. pt denies pain and moves all extremities without difficulty. pt states she landed on her buttocks and hit her left elbow. pt is a poor historian.    plan:  -FS  -CT head noncontrast  -XR left shoulder, left elbow, left hand, b/l hip & pelvis  -bed alarm  -fall precautions  -moved to [place] [address] room    will continue to monitor overnight.     Tarny Hernandez, [facility]  Medicine [id number] code fall called for pt. pt found on floor by nursing staff s/p unwitnessed fall. pt states she was getting out of bed and fell. pt states she landed on her buttocks and hit her left elbow. pt is a poor historian. as per nursing pt at baseline mental status. pt has been A&Ox2 at night during admission per nursing staff, despite documentation of A&Ox4. pt denies hitting head or LOC. no complaints at this time. pt denies: SOB, CP, palpitations, dizziness, LOC, hip / back pain. moving all extremities without difficulty.       PHYSICAL EXAM:  GENERAL: NAD  HEAD:  Atraumatic, Normocephalic  EENT: PEERL, MMM, supple  CHEST/LUNG: normally respiratory effort  HEART: Regular rate and rhythm  ABDOMEN: Soft, Nontender  EXTREMITIES:  2+ Peripheral Pulses, No clubbing, cyanosis, or edema  SKIN: No rashes or lesions    plan:  -FS   -CT head noncontrast to r/o acute bleed  -XR left shoulder, left elbow, left hand, b/l hip & pelvis  -bed alarm  -fall precautions  -moved to [place] [address] room    will continue to monitor overnight. Dr. Murphy made aware    Taryn Hernandez, [facility]  Medicine [id number] code fall called for pt. pt found on floor by nursing staff s/p unwitnessed fall. pt states she was getting out of bed and fell. pt states she landed on her buttocks and hit her left elbow. pt is a poor historian. as per nursing pt at baseline mental status. pt has been A&Ox2 at night during admission per nursing staff, despite documentation of A&Ox4. pt denies hitting head or LOC. no complaints at this time. pt denies: SOB, CP, palpitations, dizziness, LOC, hip / back pain. moving all extremities without difficulty.       Vital Signs Last 24 Hrs  T(C): 36.9 (25 Aug 2024 22:01), Max: 36.9 (25 Aug 2024 22:01)  T(F): 98.5 (25 Aug 2024 22:01), Max: 98.5 (25 Aug 2024 22:01)  HR: 69 (25 Aug 2024 22:01) (64 - 70)  BP: 116/43 (25 Aug 2024 22:01) (116/43 - 155/72)  RR: 18 (25 Aug 2024 22:01) (16 - 18)  SpO2: 95% (25 Aug 2024 22:01) (95% - 100%)    Parameters below as of 25 Aug 2024 22:01  Patient On (Oxygen Delivery Method): room air      PHYSICAL EXAM:  GENERAL: NAD  HEAD:  Atraumatic, Normocephalic  EENT: PEERL, MMM, supple  CHEST/LUNG: normally respiratory effort  HEART: Regular rate and rhythm  ABDOMEN: Soft, Nontender  EXTREMITIES:  2+ Peripheral Pulses, No clubbing, cyanosis, or edema  SKIN: No rashes or lesions    plan:  -FS   -CT head noncontrast to r/o acute bleed  -XR left shoulder, left elbow, left hand, b/l hip & pelvis  -bed alarm  -fall precautions  -moved to [place] [address] room    will continue to monitor overnight. Dr. Murphy made aware    Taryn Hernandez, [facility]  Medicine [id number] code fall called for pt. pt found on floor by nursing staff s/p unwitnessed fall. pt states she was getting out of bed and fell. pt states she landed on her buttocks and hit her left elbow. pt is a poor historian. as per nursing pt at baseline mental status. pt has been A&Ox2 at night during admission per nursing staff, despite documentation of A&Ox4. pt denies hitting head or LOC. no complaints at this time. pt denies: SOB, CP, palpitations, dizziness, LOC, hip / back pain. moving all extremities without difficulty.     T(C): 37.2 (26 Aug 2024 00:32), Max: 37.2 (26 Aug 2024 00:32)  T(F): 99 (26 Aug 2024 00:32), Max: 99 (26 Aug 2024 00:32)  HR: 65 (26 Aug 2024 00:32) (64 - 70)  BP: 130/62 (26 Aug 2024 00:32) (116/43 - 155/72)  RR: 16 (26 Aug 2024 00:32) (16 - 18)  SpO2: 100% (26 Aug 2024 00:32) (95% - 100%)    Parameters below as of 26 Aug 2024 00:32  Patient On (Oxygen Delivery Method): room air      PHYSICAL EXAM:  GENERAL: NAD  HEAD:  Atraumatic, Normocephalic  EENT: PEERL, MMM, supple  CHEST/LUNG: normally respiratory effort  HEART: Regular rate and rhythm  ABDOMEN: Soft, Nontender  EXTREMITIES:  2+ Peripheral Pulses, No clubbing, cyanosis, or edema  SKIN: No rashes or lesions    plan:  -FS   -CT head noncontrast to r/o acute bleed  -XR left shoulder, left elbow, left hand, b/l hip & pelvis  -bed alarm  -fall precautions  -moved to [place] [id number] room    will continue to monitor overnight. Dr. Murphy made aware    Taryn Hernandez, [facility]  Medicine [id number]

## 2024-08-26 NOTE — DIETITIAN INITIAL EVALUATION ADULT - NAME AND PHONE
Addended by: Faustina Shaffer on: 10/27/2022 04:36 PM     Modules accepted: Orders
Janna Lopes MS RD CDN Pager #[phone], preferred MS Teams.

## 2024-08-26 NOTE — DIETITIAN INITIAL EVALUATION ADULT - ORAL INTAKE PTA/DIET HISTORY
Patient is a limited historian, A&Ox2 at baseline.  Confirmed NKFA, no food intolerance.  Unable to recall UBW at time of visit.  Endorses taking Glucerna Therapeutic Nutrition 8 oz. 1x/day (220 kcal, 10gm protein daily) PTA.

## 2024-08-26 NOTE — PROGRESS NOTE ADULT - ASSESSMENT
79-year-old female with PMH TIA 1 and half months ago, DM2, HTN, HLD presents for a fall. nephrology consulted for hyponatremia    Hyponatremia  Hyponatremia worsen today glucose uncontrolled. corrected na 128  follows up with Dr. Manzanares in office  seen in 2022 work up at that time was polydipsia  daughter also confirmed patient still drinks alot of water  urine work up likely SIADH  normal tsh, cortisol level  free water restriction <1L/day, per patient is compliant    will give 1.5% @ 50cc/hr x5 hrs  repeat bmp @ 4pm    htn  controlled  continue current meds  monitor    acidosis  non ag  bicarb 650 tid x2 days  better    weakness  work up per team    leukocytosis  worsen today  work up per team 79-year-old female with PMH TIA 1 and half months ago, DM2, HTN, HLD presents for a fall. nephrology consulted for hyponatremia    Hyponatremia  Hyponatremia worsen today glucose uncontrolled. corrected na 128  follows up with Dr. Manzanares in office  seen in 2022 work up at that time was polydipsia  daughter also confirmed patient still drinks alot of water  urine work up likely SIADH  normal tsh, cortisol level  free water restriction <1L/day, per patient is compliant    will give 1.5% @ 50cc/hr x5 hrs  repeat bmp @ 4pm and call NEphrology with results    htn  controlled  continue current meds  monitor    acidosis  non ag  bicarb 650 tid x2 days  better    weakness  work up per team    leukocytosis  worsen today  work up per team

## 2024-08-27 LAB
ANION GAP SERPL CALC-SCNC: 13 MMOL/L — SIGNIFICANT CHANGE UP (ref 7–14)
APPEARANCE UR: ABNORMAL
BILIRUB UR-MCNC: NEGATIVE — SIGNIFICANT CHANGE UP
BUN SERPL-MCNC: 39 MG/DL — HIGH (ref 7–23)
CALCIUM SERPL-MCNC: 8.7 MG/DL — SIGNIFICANT CHANGE UP (ref 8.4–10.5)
CHLORIDE SERPL-SCNC: 93 MMOL/L — LOW (ref 98–107)
CO2 SERPL-SCNC: 22 MMOL/L — SIGNIFICANT CHANGE UP (ref 22–31)
COLOR SPEC: YELLOW — SIGNIFICANT CHANGE UP
CREAT SERPL-MCNC: 0.95 MG/DL — SIGNIFICANT CHANGE UP (ref 0.5–1.3)
DIFF PNL FLD: ABNORMAL
EGFR: 61 ML/MIN/1.73M2 — SIGNIFICANT CHANGE UP
GLUCOSE BLDC GLUCOMTR-MCNC: 243 MG/DL — HIGH (ref 70–99)
GLUCOSE BLDC GLUCOMTR-MCNC: 254 MG/DL — HIGH (ref 70–99)
GLUCOSE BLDC GLUCOMTR-MCNC: 276 MG/DL — HIGH (ref 70–99)
GLUCOSE BLDC GLUCOMTR-MCNC: 296 MG/DL — HIGH (ref 70–99)
GLUCOSE SERPL-MCNC: 250 MG/DL — HIGH (ref 70–99)
GLUCOSE UR QL: 100 MG/DL
HCT VFR BLD CALC: 34.2 % — LOW (ref 34.5–45)
HGB BLD-MCNC: 11.5 G/DL — SIGNIFICANT CHANGE UP (ref 11.5–15.5)
KETONES UR-MCNC: NEGATIVE MG/DL — SIGNIFICANT CHANGE UP
LEUKOCYTE ESTERASE UR-ACNC: ABNORMAL
MAGNESIUM SERPL-MCNC: 2 MG/DL — SIGNIFICANT CHANGE UP (ref 1.6–2.6)
MCHC RBC-ENTMCNC: 28.5 PG — SIGNIFICANT CHANGE UP (ref 27–34)
MCHC RBC-ENTMCNC: 33.6 GM/DL — SIGNIFICANT CHANGE UP (ref 32–36)
MCV RBC AUTO: 84.7 FL — SIGNIFICANT CHANGE UP (ref 80–100)
NITRITE UR-MCNC: POSITIVE
NRBC # BLD: 0 /100 WBCS — SIGNIFICANT CHANGE UP (ref 0–0)
NRBC # FLD: 0 K/UL — SIGNIFICANT CHANGE UP (ref 0–0)
PH UR: 6 — SIGNIFICANT CHANGE UP (ref 5–8)
PHOSPHATE SERPL-MCNC: 2.6 MG/DL — SIGNIFICANT CHANGE UP (ref 2.5–4.5)
PLATELET # BLD AUTO: 347 K/UL — SIGNIFICANT CHANGE UP (ref 150–400)
POTASSIUM SERPL-MCNC: 4.6 MMOL/L — SIGNIFICANT CHANGE UP (ref 3.5–5.3)
POTASSIUM SERPL-SCNC: 4.6 MMOL/L — SIGNIFICANT CHANGE UP (ref 3.5–5.3)
PROT UR-MCNC: 30 MG/DL
RBC # BLD: 4.04 M/UL — SIGNIFICANT CHANGE UP (ref 3.8–5.2)
RBC # FLD: 12 % — SIGNIFICANT CHANGE UP (ref 10.3–14.5)
SODIUM SERPL-SCNC: 128 MMOL/L — LOW (ref 135–145)
SP GR SPEC: 1.02 — SIGNIFICANT CHANGE UP (ref 1–1.03)
UROBILINOGEN FLD QL: 0.2 MG/DL — SIGNIFICANT CHANGE UP (ref 0.2–1)
WBC # BLD: 17.96 K/UL — HIGH (ref 3.8–10.5)
WBC # FLD AUTO: 17.96 K/UL — HIGH (ref 3.8–10.5)

## 2024-08-27 RX ORDER — INSULIN GLARGINE 100 [IU]/ML
12 INJECTION, SOLUTION SUBCUTANEOUS AT BEDTIME
Refills: 0 | Status: DISCONTINUED | OUTPATIENT
Start: 2024-08-27 | End: 2024-09-02

## 2024-08-27 RX ORDER — ACETAMINOPHEN 325 MG/1
1000 TABLET ORAL ONCE
Refills: 0 | Status: COMPLETED | OUTPATIENT
Start: 2024-08-27 | End: 2024-08-27

## 2024-08-27 RX ADMIN — METOPROLOL TARTRATE 100 MILLIGRAM(S): 100 TABLET ORAL at 03:20

## 2024-08-27 RX ADMIN — Medication 3 UNIT(S): at 13:10

## 2024-08-27 RX ADMIN — ACETAMINOPHEN 650 MILLIGRAM(S): 325 TABLET ORAL at 23:03

## 2024-08-27 RX ADMIN — Medication 100 MILLIGRAM(S): at 11:23

## 2024-08-27 RX ADMIN — INSULIN GLARGINE 12 UNIT(S): 100 INJECTION, SOLUTION SUBCUTANEOUS at 22:30

## 2024-08-27 RX ADMIN — Medication 40 MILLIGRAM(S): at 22:30

## 2024-08-27 RX ADMIN — Medication 81 MILLIGRAM(S): at 11:24

## 2024-08-27 RX ADMIN — Medication 1 PATCH: at 22:34

## 2024-08-27 RX ADMIN — OXYCODONE HYDROCHLORIDE 2.5 MILLIGRAM(S): 5 TABLET ORAL at 01:22

## 2024-08-27 RX ADMIN — NIFEDIPINE 90 MILLIGRAM(S): 60 TABLET, FILM COATED, EXTENDED RELEASE ORAL at 03:44

## 2024-08-27 RX ADMIN — SODIUM CHLORIDE 1 GRAM(S): 9 INJECTION INTRAMUSCULAR; INTRAVENOUS; SUBCUTANEOUS at 13:37

## 2024-08-27 RX ADMIN — Medication 200 MILLIGRAM(S): at 17:46

## 2024-08-27 RX ADMIN — Medication 1: at 22:33

## 2024-08-27 RX ADMIN — ACETAMINOPHEN 400 MILLIGRAM(S): 325 TABLET ORAL at 03:20

## 2024-08-27 RX ADMIN — CHLORHEXIDINE GLUCONATE 1 APPLICATION(S): 40 SOLUTION TOPICAL at 11:24

## 2024-08-27 RX ADMIN — OXYCODONE HYDROCHLORIDE 2.5 MILLIGRAM(S): 5 TABLET ORAL at 01:52

## 2024-08-27 RX ADMIN — Medication 3: at 17:40

## 2024-08-27 RX ADMIN — SODIUM CHLORIDE 1 GRAM(S): 9 INJECTION INTRAMUSCULAR; INTRAVENOUS; SUBCUTANEOUS at 22:30

## 2024-08-27 RX ADMIN — ACETAMINOPHEN 1000 MILLIGRAM(S): 325 TABLET ORAL at 03:50

## 2024-08-27 RX ADMIN — Medication 2: at 09:26

## 2024-08-27 RX ADMIN — Medication 3 UNIT(S): at 17:39

## 2024-08-27 RX ADMIN — Medication 1 PATCH: at 13:36

## 2024-08-27 RX ADMIN — LISINOPRIL 20 MILLIGRAM(S): 10 TABLET ORAL at 06:27

## 2024-08-27 RX ADMIN — ACETAMINOPHEN 650 MILLIGRAM(S): 325 TABLET ORAL at 22:33

## 2024-08-27 RX ADMIN — SODIUM CHLORIDE 1 GRAM(S): 9 INJECTION INTRAMUSCULAR; INTRAVENOUS; SUBCUTANEOUS at 06:26

## 2024-08-27 RX ADMIN — Medication 1 APPLICATION(S): at 06:27

## 2024-08-27 RX ADMIN — Medication 1 PATCH: at 07:00

## 2024-08-27 RX ADMIN — Medication 3: at 13:10

## 2024-08-27 RX ADMIN — Medication 1 APPLICATION(S): at 17:41

## 2024-08-27 RX ADMIN — Medication 3 UNIT(S): at 09:29

## 2024-08-27 NOTE — PROGRESS NOTE ADULT - ASSESSMENT
79F with recent TIA (6 weeks ago),  SAH with IVH in 2015, DM, CAD, and HLD presents s/p fall. Pt was walking with assistance out of her home today when she fell slowly to the floor landing on her buttocks. No head trauma. C/o left sided weakness  L hemiparesis on exam, pain limited at L hip  A1C 6.1, LDL 79  TTE EF > 75%, no valvular abnormalities  orthostatics neg   s/p fall 8./26 AM, repeat CTh neg    Fall - mechanical ?deconditioning   Recent TIA - now with L hemiparesis , unclear if pain related vs stroke    - aspirin 81 and statin for secondary stroke prevention  - TTE as above  - Cardiology following   - MRI brain w/o as inpt vs outpt  - NSAIDs for L hip pain  - Nephro following for hyponatremia - ?SIADH  - infectious w/u for worsening leukocytosis, urine cx pending  - PT/OT  - DVT ppx       Evonne Tenorio, DO  Vascular Neurology  Office [phone]

## 2024-08-27 NOTE — PROVIDER CONTACT NOTE (OTHER) - BACKGROUND
79F p/w left hip pain s/p fall
Adm for left hip pain. H/O DM, hyperlipidemia, CAD, HTN.
Adm left hip pain. H/O DM2, HLD, CAD, HTN, recent fall.

## 2024-08-27 NOTE — PROVIDER CONTACT NOTE (OTHER) - ASSESSMENT
moderate pain in left leg, worsen with activity when pt went to bathroom.
BGFS 152. Pt refusing insulin, stating that her BG is controlled at home with her PO DM meds. Pt edu given regarding in-patient glycemic control with insulin rather than PO formula. She insists that she does not want insulin at this time but is willing to consider it if the BG becomes "very high".
Pt has a BP of 190/58, HR 58. pt denies any pain, headaches, or changes in vision.
Pt has an oral temp of 97.5, , /76, RR18, O2 99%. Pt denies pain, headache or changes in vision. Manual BP rechecked 178/76
Pt has an oral temp of 98.2F, , /79, RR18, O2 99%. Pt denies pain, headache or changes in vision. Recheck BP after previous provider notification.
Pt has an oral temp of 97.5, HR 64, /76. Pt denies pain, headache or changes in vision.
Pt reports feeling of fullness on the left side of her face that started around 9am today and mild discomfort radiating toward back of head ipsilaterally. On examination, left-sided facial swelling noted, slightly tender on palpation, no discoloration noted. Denies numbness and tingling at the site.

## 2024-08-27 NOTE — PROVIDER CONTACT NOTE (OTHER) - RECOMMENDATIONS
provider made aware.
Please speak with patient and reinforce indication for insulin in DM management during hospitalization.
Please evaluate pt.
provider made aware.

## 2024-08-27 NOTE — PROVIDER CONTACT NOTE (OTHER) - SITUATION
Pt reports new onset of feeling of fullness on left face.
Pt's reassessed BP after scheduled AM PO meds is 190/58, HR 68.
leg pain unrelieved by tylenol PO.
Pt refusing insulin as ordered.
Pt has a BP of 192/76
/76
/79

## 2024-08-27 NOTE — PROVIDER CONTACT NOTE (OTHER) - NAME OF MD/NP/PA/DO NOTIFIED:
Brianda Tejada (ACP)
MARKIE Howard [id number]
[employer] FRANKS)
Blake ADEN
NAHUM Farrell
Blake ADEN
NAHUM Farrell

## 2024-08-27 NOTE — PROVIDER CONTACT NOTE (OTHER) - REASON
Pt refusing insulin as ordered
Pt has a BP of 192/76
Pt reports feeling of fullness on left face
Pt has a BP of 190/58 s/p scheduled AM PO BP meds
/79
/76
unrelieved leg pain

## 2024-08-27 NOTE — PROGRESS NOTE ADULT - ASSESSMENT
79-year-old female with PMH TIA 1 and half months ago, DM2, HTN, HLD presents for a fall. nephrology consulted for hyponatremia    Hyponatremia  Hyponatremia worsen today glucose uncontrolled. corrected na 128  follows up with Dr. Manzanares in office  seen in 2022 work up at that time was polydipsia  daughter also confirmed patient still drinks alot of water  urine work up likely SIADH  normal tsh, cortisol level  free water restriction <1L/day, per patient is compliant    optimize dm control  corrected na stable 130-131    htn  controlled  continue current meds  monitor    acidosis  non ag  bicarb 650 tid x2 days  better    weakness  work up per team    leukocytosis  better  check UA  check bladder scan as pt c/o unable to urinate normally  work up per team 79-year-old female with PMH TIA 1 and half months ago, DM2, HTN, HLD presents for a fall. nephrology consulted for hyponatremia    Hyponatremia  Hyponatremia worsen today glucose uncontrolled. corrected na 128  follows up with Dr. Manzanares in office  seen in 2022 work up at that time was polydipsia  daughter also confirmed patient still drinks alot of water  urine work up likely SIADH  normal tsh, cortisol level  free water restriction <1L/day, per patient is compliant    optimize dm control  continue na tab as ordered   corrected na stable 130-131    htn  controlled  continue current meds  monitor    acidosis  non ag  bicarb 650 tid x2 days  better    weakness  work up per team    leukocytosis  better  check UA  check bladder scan as pt c/o unable to urinate normally  work up per team

## 2024-08-27 NOTE — PROGRESS NOTE ADULT - ASSESSMENT
79 /o F with pmh of TIA (1.5 months ago), DM type 2, HTN, HLD p/w fall.      UTI :   leukocytosis   c/o dysuria   started on levaquin IV   will wait for c/s , and adjust abx   pt. allergic to PCN and sulfa     # Left hip pain 2/2 OA  improved with tylenol   c/w tylenol     # S/P fall/ found on floor   -s/p CT head : neg for any ac changes   -fall precaution    # hyponatremia , hx of polydipsia   sod 128 : improving   started on salt tab   renal following     # Type 2 diabetes mellitus.   ·  Plan: - hold metformin  FSBS are high   - Lispro sliding scale.  add lantus 6 units q HS     # Essential hypertension.   on lisinopril 10 mg , increase to 20 mg : now controlled   c/w procardia XL and Toprol XL     # History of transient ischemic attack (TIA).   ·  Plan: - cw ASA and statin.  neuro fu     dispo: started on levaquin IV , f/u urine c/s and blood c/s     newton charles MD   covering Dr. Mason Murphy

## 2024-08-27 NOTE — PROVIDER CONTACT NOTE (OTHER) - ACTION/TREATMENT ORDERED:
Provider put orders for metoprolol and nifedipine at this time, meds given to the patient as per provider's order.
Per ACP Kleberhda, will see patient at bedside. Neurology team also requested for consult.
Provider made aware, recheck BP in 30mins and keep monitoring as per provider.
ACP Austyn Howard [id number]. provider to order pain medication
Acknowledged by Blake ADEN, will speak with patient.
Provider made aware. Give scheduled PO BP meds and reassess in an hour.
Provider made aware. Nifedipine given as ordered.

## 2024-08-27 NOTE — PROVIDER CONTACT NOTE (OTHER) - DATE AND TIME:
20-Aug-2024 13:16
24-Aug-2024 07:10
20-Aug-2024 00:00
20-Aug-2024 13:16
27-Aug-2024 02:50
27-Aug-2024 01:30
24-Aug-2024 08:44

## 2024-08-27 NOTE — PROGRESS NOTE ADULT - ASSESSMENT
S/p Fall   neg orthostatic vitals  PT eval     murmur  echo shows mid cavitary gradient and aortic calcification   cont BB    HTN  change toprol to labetalol as ordered     hyponatremia  plan as per renal     history of TIA  asa  statin

## 2024-08-28 LAB
ANION GAP SERPL CALC-SCNC: 12 MMOL/L — SIGNIFICANT CHANGE UP (ref 7–14)
ANION GAP SERPL CALC-SCNC: 15 MMOL/L — HIGH (ref 7–14)
BASOPHILS # BLD AUTO: 0.05 K/UL — SIGNIFICANT CHANGE UP (ref 0–0.2)
BASOPHILS NFR BLD AUTO: 0.3 % — SIGNIFICANT CHANGE UP (ref 0–2)
BUN SERPL-MCNC: 38 MG/DL — HIGH (ref 7–23)
BUN SERPL-MCNC: 38 MG/DL — HIGH (ref 7–23)
CALCIUM SERPL-MCNC: 8.6 MG/DL — SIGNIFICANT CHANGE UP (ref 8.4–10.5)
CALCIUM SERPL-MCNC: 8.9 MG/DL — SIGNIFICANT CHANGE UP (ref 8.4–10.5)
CHLORIDE SERPL-SCNC: 90 MMOL/L — LOW (ref 98–107)
CHLORIDE SERPL-SCNC: 93 MMOL/L — LOW (ref 98–107)
CO2 SERPL-SCNC: 19 MMOL/L — LOW (ref 22–31)
CO2 SERPL-SCNC: 20 MMOL/L — LOW (ref 22–31)
CREAT SERPL-MCNC: 1.03 MG/DL — SIGNIFICANT CHANGE UP (ref 0.5–1.3)
CREAT SERPL-MCNC: 1.18 MG/DL — SIGNIFICANT CHANGE UP (ref 0.5–1.3)
EGFR: 47 ML/MIN/1.73M2 — LOW
EGFR: 55 ML/MIN/1.73M2 — LOW
EOSINOPHIL # BLD AUTO: 0.23 K/UL — SIGNIFICANT CHANGE UP (ref 0–0.5)
EOSINOPHIL NFR BLD AUTO: 1.5 % — SIGNIFICANT CHANGE UP (ref 0–6)
GLUCOSE BLDC GLUCOMTR-MCNC: 213 MG/DL — HIGH (ref 70–99)
GLUCOSE BLDC GLUCOMTR-MCNC: 271 MG/DL — HIGH (ref 70–99)
GLUCOSE BLDC GLUCOMTR-MCNC: 291 MG/DL — HIGH (ref 70–99)
GLUCOSE BLDC GLUCOMTR-MCNC: 310 MG/DL — HIGH (ref 70–99)
GLUCOSE BLDC GLUCOMTR-MCNC: 331 MG/DL — HIGH (ref 70–99)
GLUCOSE SERPL-MCNC: 229 MG/DL — HIGH (ref 70–99)
GLUCOSE SERPL-MCNC: 251 MG/DL — HIGH (ref 70–99)
HCT VFR BLD CALC: 32 % — LOW (ref 34.5–45)
HGB BLD-MCNC: 10.8 G/DL — LOW (ref 11.5–15.5)
IANC: 10.62 K/UL — HIGH (ref 1.8–7.4)
IMM GRANULOCYTES NFR BLD AUTO: 0.5 % — SIGNIFICANT CHANGE UP (ref 0–0.9)
LYMPHOCYTES # BLD AUTO: 20.3 % — SIGNIFICANT CHANGE UP (ref 13–44)
LYMPHOCYTES # BLD AUTO: 3.21 K/UL — SIGNIFICANT CHANGE UP (ref 1–3.3)
MAGNESIUM SERPL-MCNC: 1.7 MG/DL — SIGNIFICANT CHANGE UP (ref 1.6–2.6)
MAGNESIUM SERPL-MCNC: 1.8 MG/DL — SIGNIFICANT CHANGE UP (ref 1.6–2.6)
MCHC RBC-ENTMCNC: 28.5 PG — SIGNIFICANT CHANGE UP (ref 27–34)
MCHC RBC-ENTMCNC: 33.8 GM/DL — SIGNIFICANT CHANGE UP (ref 32–36)
MCV RBC AUTO: 84.4 FL — SIGNIFICANT CHANGE UP (ref 80–100)
MONOCYTES # BLD AUTO: 1.65 K/UL — HIGH (ref 0–0.9)
MONOCYTES NFR BLD AUTO: 10.4 % — SIGNIFICANT CHANGE UP (ref 2–14)
NEUTROPHILS # BLD AUTO: 10.62 K/UL — HIGH (ref 1.8–7.4)
NEUTROPHILS NFR BLD AUTO: 67 % — SIGNIFICANT CHANGE UP (ref 43–77)
NRBC # BLD: 0 /100 WBCS — SIGNIFICANT CHANGE UP (ref 0–0)
NRBC # FLD: 0 K/UL — SIGNIFICANT CHANGE UP (ref 0–0)
PHOSPHATE SERPL-MCNC: 3.1 MG/DL — SIGNIFICANT CHANGE UP (ref 2.5–4.5)
PHOSPHATE SERPL-MCNC: 3.5 MG/DL — SIGNIFICANT CHANGE UP (ref 2.5–4.5)
PLATELET # BLD AUTO: 340 K/UL — SIGNIFICANT CHANGE UP (ref 150–400)
POTASSIUM SERPL-MCNC: 4.6 MMOL/L — SIGNIFICANT CHANGE UP (ref 3.5–5.3)
POTASSIUM SERPL-MCNC: 5.1 MMOL/L — SIGNIFICANT CHANGE UP (ref 3.5–5.3)
POTASSIUM SERPL-SCNC: 4.6 MMOL/L — SIGNIFICANT CHANGE UP (ref 3.5–5.3)
POTASSIUM SERPL-SCNC: 5.1 MMOL/L — SIGNIFICANT CHANGE UP (ref 3.5–5.3)
RBC # BLD: 3.79 M/UL — LOW (ref 3.8–5.2)
RBC # FLD: 12.1 % — SIGNIFICANT CHANGE UP (ref 10.3–14.5)
SODIUM SERPL-SCNC: 124 MMOL/L — LOW (ref 135–145)
SODIUM SERPL-SCNC: 125 MMOL/L — LOW (ref 135–145)
WBC # BLD: 15.84 K/UL — HIGH (ref 3.8–10.5)
WBC # FLD AUTO: 15.84 K/UL — HIGH (ref 3.8–10.5)

## 2024-08-28 PROCEDURE — 71045 X-RAY EXAM CHEST 1 VIEW: CPT | Mod: 26

## 2024-08-28 RX ORDER — SODIUM BICARBONATE 84 MG/ML
650 INJECTION, SOLUTION INTRAVENOUS
Refills: 0 | Status: DISCONTINUED | OUTPATIENT
Start: 2024-08-28 | End: 2024-09-02

## 2024-08-28 RX ORDER — TOLVAPTAN 30 MG/1
15 TABLET ORAL ONCE
Refills: 0 | Status: COMPLETED | OUTPATIENT
Start: 2024-08-28 | End: 2024-08-28

## 2024-08-28 RX ADMIN — SODIUM CHLORIDE 1 GRAM(S): 9 INJECTION INTRAMUSCULAR; INTRAVENOUS; SUBCUTANEOUS at 05:45

## 2024-08-28 RX ADMIN — Medication 3 UNIT(S): at 12:51

## 2024-08-28 RX ADMIN — SODIUM BICARBONATE 650 MILLIGRAM(S): 84 INJECTION, SOLUTION INTRAVENOUS at 18:19

## 2024-08-28 RX ADMIN — Medication 3 UNIT(S): at 09:06

## 2024-08-28 RX ADMIN — CHLORHEXIDINE GLUCONATE 1 APPLICATION(S): 40 SOLUTION TOPICAL at 11:37

## 2024-08-28 RX ADMIN — INSULIN GLARGINE 12 UNIT(S): 100 INJECTION, SOLUTION SUBCUTANEOUS at 21:54

## 2024-08-28 RX ADMIN — Medication 4: at 12:51

## 2024-08-28 RX ADMIN — Medication 100 MILLIGRAM(S): at 11:35

## 2024-08-28 RX ADMIN — Medication 1 PATCH: at 10:34

## 2024-08-28 RX ADMIN — NIFEDIPINE 90 MILLIGRAM(S): 60 TABLET, FILM COATED, EXTENDED RELEASE ORAL at 05:45

## 2024-08-28 RX ADMIN — Medication 1: at 21:51

## 2024-08-28 RX ADMIN — Medication 200 MILLIGRAM(S): at 05:45

## 2024-08-28 RX ADMIN — Medication 1 APPLICATION(S): at 05:45

## 2024-08-28 RX ADMIN — OXYCODONE HYDROCHLORIDE 2.5 MILLIGRAM(S): 5 TABLET ORAL at 11:24

## 2024-08-28 RX ADMIN — Medication 40 MILLIGRAM(S): at 21:52

## 2024-08-28 RX ADMIN — Medication 2: at 18:18

## 2024-08-28 RX ADMIN — Medication 1 PATCH: at 21:52

## 2024-08-28 RX ADMIN — Medication 1 PATCH: at 07:55

## 2024-08-28 RX ADMIN — OXYCODONE HYDROCHLORIDE 2.5 MILLIGRAM(S): 5 TABLET ORAL at 10:54

## 2024-08-28 RX ADMIN — Medication 81 MILLIGRAM(S): at 11:35

## 2024-08-28 RX ADMIN — Medication 3: at 09:05

## 2024-08-28 RX ADMIN — LISINOPRIL 20 MILLIGRAM(S): 10 TABLET ORAL at 05:45

## 2024-08-28 RX ADMIN — TOLVAPTAN 15 MILLIGRAM(S): 30 TABLET ORAL at 11:35

## 2024-08-28 RX ADMIN — Medication 3 UNIT(S): at 18:19

## 2024-08-28 RX ADMIN — Medication 200 MILLIGRAM(S): at 18:22

## 2024-08-28 NOTE — PROGRESS NOTE ADULT - ASSESSMENT
79F with recent TIA (6 weeks ago),  SAH with IVH in 2015, DM, CAD, and HLD presents s/p fall. Pt was walking with assistance out of her home today when she fell slowly to the floor landing on her buttocks. No head trauma. C/o left sided weakness  L hemiparesis on exam, pain limited at L hip  A1C 6.1, LDL 79  TTE EF > 75%, no valvular abnormalities  orthostatics neg   s/p fall 8/26 AM, repeat CTh neg    Fall - mechanical ?deconditioning   Recent TIA - now with L hemiparesis , unclear if pain related vs stroke  UTI    - aspirin 81 and statin for secondary stroke prevention  - TTE as above  - Cardiology following   - MRI brain w/o as inpt vs outpt  - NSAIDs for L hip pain  - Nephro following for hyponatremia - ?SIADH, can be contributing to falls  - on levaquin for UTI  - PT/OT  - DVT ppx       Evonne Tenorio, DO  Vascular Neurology  Office [phone]

## 2024-08-28 NOTE — PROGRESS NOTE ADULT - ASSESSMENT
S/p Fall   neg orthostatic vitals  PT eval     murmur  echo shows mid cavitary gradient and aortic calcification   cont BB    HTN  much better now   cont current meds     hyponatremia  plan as per renal     history of TIA  asa  statin

## 2024-08-28 NOTE — PROGRESS NOTE ADULT - ASSESSMENT
79-year-old female with PMH TIA 1 and half months ago, DM2, HTN, HLD presents for a fall. nephrology consulted for hyponatremia    Hyponatremia  Hyponatremia worsen today glucose uncontrolled. corrected na 128  follows up with Dr. Manzanares in office  seen in 2022 work up at that time was polydipsia  daughter also confirmed patient still drinks alot of water  urine work up likely SIADH  normal tsh, cortisol level  free water restriction <1L/day, per patient is compliant    na dropped again. despite fluid restriction and na tab  will dc na tab and give one dose of tolvaptan  dc fluid restriction once pt receive tolvaptan, pt drink to thirst  optimize dm control  monitor    htn  controlled  continue current meds  monitor    acidosis  non ag  restart biarb  better    weakness  work up per team    leukocytosis  better  likely UTI  started on iv levo  work up per team 79-year-old female with PMH TIA 1 and half months ago, DM2, HTN, HLD presents for a fall. nephrology consulted for hyponatremia    Hyponatremia  Hyponatremia worsen today glucose uncontrolled. corrected na 128  follows up with Dr. Manzanares in office  seen in 2022 work up at that time was polydipsia  daughter also confirmed patient still drinks alot of water  urine work up likely SIADH  normal tsh, cortisol level  free water restriction <1L/day, per patient is compliant    na dropped again. despite fluid restriction and na tab  will dc na tab and give one dose of tolvaptan  repeat BMP at 4 pm and update nephro  dc fluid restriction once pt receive tolvaptan, pt drink to thirst  optimize dm control  monitor    htn  controlled  continue current meds  monitor    acidosis  non ag  restart biarb  better    weakness  work up per team    leukocytosis  better  likely UTI  started on iv levo  work up per team

## 2024-08-28 NOTE — CHART NOTE - NSCHARTNOTEFT_GEN_A_CORE
Evening BMP shows sodium level of 125. Sodium level was 124 earlier in the day and patient received tolvaptan. Repeat results d/w nephro on call, recommended to continue to monitor and repeat levels in am.

## 2024-08-28 NOTE — PROGRESS NOTE ADULT - ASSESSMENT
79 /o F with pmh of TIA (1.5 months ago), DM type 2, HTN, HLD p/w fall.      UTI :   leukocytosis   c/o dysuria   started on levaquin IV   will wait for c/s , and adjust abx   pt. allergic to PCN and sulfa     # Left hip pain 2/2 OA  improved with tylenol   c/w tylenol     # S/P fall/ found on floor   -s/p CT head : neg for any ac changes   -fall precaution    # hyponatremia , hx of polydipsia   decreasing , s/p Tolvaptan   started on salt tab   renal following     # Type 2 diabetes mellitus.   ·  Plan: - hold metformin  FSBS are high   - Lispro sliding scale.  add lantus 6 units q HS     # Essential hypertension.   on lisinopril 10 mg , increase to 20 mg : now controlled   c/w procardia XL and Toprol XL     # History of transient ischemic attack (TIA).   ·  Plan: - cw ASA and statin.  neuro fu     dispo: uA was not collected , wbc improving on levaquin , sod still low     newton charles MD   covering Dr. Mason Murhpy

## 2024-08-29 LAB
ANION GAP SERPL CALC-SCNC: 10 MMOL/L — SIGNIFICANT CHANGE UP (ref 7–14)
ANION GAP SERPL CALC-SCNC: 11 MMOL/L — SIGNIFICANT CHANGE UP (ref 7–14)
BASOPHILS # BLD AUTO: 0.05 K/UL — SIGNIFICANT CHANGE UP (ref 0–0.2)
BASOPHILS NFR BLD AUTO: 0.4 % — SIGNIFICANT CHANGE UP (ref 0–2)
BUN SERPL-MCNC: 32 MG/DL — HIGH (ref 7–23)
BUN SERPL-MCNC: 36 MG/DL — HIGH (ref 7–23)
CALCIUM SERPL-MCNC: 9 MG/DL — SIGNIFICANT CHANGE UP (ref 8.4–10.5)
CALCIUM SERPL-MCNC: 9 MG/DL — SIGNIFICANT CHANGE UP (ref 8.4–10.5)
CHLORIDE SERPL-SCNC: 95 MMOL/L — LOW (ref 98–107)
CHLORIDE SERPL-SCNC: 95 MMOL/L — LOW (ref 98–107)
CO2 SERPL-SCNC: 22 MMOL/L — SIGNIFICANT CHANGE UP (ref 22–31)
CO2 SERPL-SCNC: 22 MMOL/L — SIGNIFICANT CHANGE UP (ref 22–31)
CREAT SERPL-MCNC: 1.15 MG/DL — SIGNIFICANT CHANGE UP (ref 0.5–1.3)
CREAT SERPL-MCNC: 1.2 MG/DL — SIGNIFICANT CHANGE UP (ref 0.5–1.3)
EGFR: 46 ML/MIN/1.73M2 — LOW
EGFR: 48 ML/MIN/1.73M2 — LOW
EOSINOPHIL # BLD AUTO: 0.28 K/UL — SIGNIFICANT CHANGE UP (ref 0–0.5)
EOSINOPHIL NFR BLD AUTO: 2.2 % — SIGNIFICANT CHANGE UP (ref 0–6)
GLUCOSE BLDC GLUCOMTR-MCNC: 223 MG/DL — HIGH (ref 70–99)
GLUCOSE BLDC GLUCOMTR-MCNC: 234 MG/DL — HIGH (ref 70–99)
GLUCOSE BLDC GLUCOMTR-MCNC: 238 MG/DL — HIGH (ref 70–99)
GLUCOSE BLDC GLUCOMTR-MCNC: 372 MG/DL — HIGH (ref 70–99)
GLUCOSE SERPL-MCNC: 200 MG/DL — HIGH (ref 70–99)
GLUCOSE SERPL-MCNC: 273 MG/DL — HIGH (ref 70–99)
HCT VFR BLD CALC: 31.3 % — LOW (ref 34.5–45)
HGB BLD-MCNC: 10.4 G/DL — LOW (ref 11.5–15.5)
IANC: 8.12 K/UL — HIGH (ref 1.8–7.4)
IMM GRANULOCYTES NFR BLD AUTO: 0.6 % — SIGNIFICANT CHANGE UP (ref 0–0.9)
LYMPHOCYTES # BLD AUTO: 2.82 K/UL — SIGNIFICANT CHANGE UP (ref 1–3.3)
LYMPHOCYTES # BLD AUTO: 22.3 % — SIGNIFICANT CHANGE UP (ref 13–44)
MAGNESIUM SERPL-MCNC: 1.8 MG/DL — SIGNIFICANT CHANGE UP (ref 1.6–2.6)
MAGNESIUM SERPL-MCNC: 1.8 MG/DL — SIGNIFICANT CHANGE UP (ref 1.6–2.6)
MCHC RBC-ENTMCNC: 28.1 PG — SIGNIFICANT CHANGE UP (ref 27–34)
MCHC RBC-ENTMCNC: 33.2 GM/DL — SIGNIFICANT CHANGE UP (ref 32–36)
MCV RBC AUTO: 84.6 FL — SIGNIFICANT CHANGE UP (ref 80–100)
MONOCYTES # BLD AUTO: 1.33 K/UL — HIGH (ref 0–0.9)
MONOCYTES NFR BLD AUTO: 10.5 % — SIGNIFICANT CHANGE UP (ref 2–14)
NEUTROPHILS # BLD AUTO: 8.12 K/UL — HIGH (ref 1.8–7.4)
NEUTROPHILS NFR BLD AUTO: 64 % — SIGNIFICANT CHANGE UP (ref 43–77)
NRBC # BLD: 0 /100 WBCS — SIGNIFICANT CHANGE UP (ref 0–0)
NRBC # FLD: 0 K/UL — SIGNIFICANT CHANGE UP (ref 0–0)
PHOSPHATE SERPL-MCNC: 3.5 MG/DL — SIGNIFICANT CHANGE UP (ref 2.5–4.5)
PHOSPHATE SERPL-MCNC: 4 MG/DL — SIGNIFICANT CHANGE UP (ref 2.5–4.5)
PLATELET # BLD AUTO: 332 K/UL — SIGNIFICANT CHANGE UP (ref 150–400)
POTASSIUM SERPL-MCNC: 4.8 MMOL/L — SIGNIFICANT CHANGE UP (ref 3.5–5.3)
POTASSIUM SERPL-MCNC: 5.2 MMOL/L — SIGNIFICANT CHANGE UP (ref 3.5–5.3)
POTASSIUM SERPL-SCNC: 4.8 MMOL/L — SIGNIFICANT CHANGE UP (ref 3.5–5.3)
POTASSIUM SERPL-SCNC: 5.2 MMOL/L — SIGNIFICANT CHANGE UP (ref 3.5–5.3)
RBC # BLD: 3.7 M/UL — LOW (ref 3.8–5.2)
RBC # FLD: 12.4 % — SIGNIFICANT CHANGE UP (ref 10.3–14.5)
SODIUM SERPL-SCNC: 127 MMOL/L — LOW (ref 135–145)
SODIUM SERPL-SCNC: 128 MMOL/L — LOW (ref 135–145)
WBC # BLD: 12.67 K/UL — HIGH (ref 3.8–10.5)
WBC # FLD AUTO: 12.67 K/UL — HIGH (ref 3.8–10.5)

## 2024-08-29 RX ORDER — SODIUM CHLORIDE 3 G/100ML
500 INJECTION, SOLUTION INTRAVENOUS
Refills: 0 | Status: DISCONTINUED | OUTPATIENT
Start: 2024-08-29 | End: 2024-08-31

## 2024-08-29 RX ORDER — SODIUM CHLORIDE 3 G/100ML
1000 INJECTION, SOLUTION INTRAVENOUS
Refills: 0 | Status: DISCONTINUED | OUTPATIENT
Start: 2024-08-29 | End: 2024-08-31

## 2024-08-29 RX ORDER — OXYCODONE HYDROCHLORIDE 5 MG/1
2.5 TABLET ORAL EVERY 8 HOURS
Refills: 0 | Status: DISCONTINUED | OUTPATIENT
Start: 2024-08-29 | End: 2024-09-02

## 2024-08-29 RX ADMIN — Medication 200 MILLIGRAM(S): at 06:19

## 2024-08-29 RX ADMIN — Medication 100 MILLIGRAM(S): at 13:04

## 2024-08-29 RX ADMIN — Medication 81 MILLIGRAM(S): at 13:04

## 2024-08-29 RX ADMIN — SODIUM CHLORIDE 50 MILLILITER(S): 3 INJECTION, SOLUTION INTRAVENOUS at 10:35

## 2024-08-29 RX ADMIN — LISINOPRIL 20 MILLIGRAM(S): 10 TABLET ORAL at 06:19

## 2024-08-29 RX ADMIN — Medication 2: at 17:40

## 2024-08-29 RX ADMIN — SODIUM CHLORIDE 50 MILLILITER(S): 3 INJECTION, SOLUTION INTRAVENOUS at 19:05

## 2024-08-29 RX ADMIN — CHLORHEXIDINE GLUCONATE 1 APPLICATION(S): 40 SOLUTION TOPICAL at 13:04

## 2024-08-29 RX ADMIN — Medication 5: at 12:43

## 2024-08-29 RX ADMIN — Medication 1 PATCH: at 21:55

## 2024-08-29 RX ADMIN — Medication 2: at 09:14

## 2024-08-29 RX ADMIN — Medication 200 MILLIGRAM(S): at 17:07

## 2024-08-29 RX ADMIN — SODIUM BICARBONATE 650 MILLIGRAM(S): 84 INJECTION, SOLUTION INTRAVENOUS at 17:08

## 2024-08-29 RX ADMIN — Medication 40 MILLIGRAM(S): at 21:07

## 2024-08-29 RX ADMIN — SODIUM BICARBONATE 650 MILLIGRAM(S): 84 INJECTION, SOLUTION INTRAVENOUS at 06:19

## 2024-08-29 RX ADMIN — NIFEDIPINE 90 MILLIGRAM(S): 60 TABLET, FILM COATED, EXTENDED RELEASE ORAL at 06:19

## 2024-08-29 RX ADMIN — Medication 3 UNIT(S): at 09:14

## 2024-08-29 RX ADMIN — Medication 3 UNIT(S): at 17:40

## 2024-08-29 RX ADMIN — INSULIN GLARGINE 12 UNIT(S): 100 INJECTION, SOLUTION SUBCUTANEOUS at 21:55

## 2024-08-29 RX ADMIN — Medication 3 UNIT(S): at 12:43

## 2024-08-29 NOTE — PROGRESS NOTE ADULT - ASSESSMENT
79-year-old female with PMH TIA 1 and half months ago, DM2, HTN, HLD presents for a fall. nephrology consulted for hyponatremia    Hyponatremia  follows up with Dr. Manzanares in office  seen in 2022 work up at that time was polydipsia  daughter also confirmed patient still drinks alot of water  repeat urine work up likely SIADH  normal tsh, cortisol level  free water restriction <1L/day, per patient is compliant    na dropped again. despite fluid restriction and na tab s/p tolvaptan 8/28. Na improved to 129 after correction  will give 1.5% @ 50cc/hr x5 hrs today  repeat bmp @ 5pm  optimize dm control  monitor    htn  controlled  continue current meds  monitor    acidosis  non ag  restart biarb  better    weakness  work up per team    leukocytosis  better  likely UTI  started on iv levo  work up per team

## 2024-08-29 NOTE — PROGRESS NOTE ADULT - ASSESSMENT
79 /o F with pmh of TIA (1.5 months ago), DM type 2, HTN, HLD p/w fall.      UTI :   leukocytosis   c/o dysuria   started on levaquin IV   urin is growing Ecoli : sensitivity pending   wbc improving on levaquin     # Left hip pain 2/2 OA  improved with tylenol   c/w tylenol     # S/P fall/ found on floor   -s/p CT head : neg for any ac changes   -fall precaution    # hyponatremia , hx of polydipsia   decreasing , s/p Tolvaptan   again sod drop , started on 1.5% NACL x 5 hrs   restarted on bicarb for acidosis     # Type 2 diabetes mellitus.   ·  Plan: - hold metformin  FSBS are high   - Lispro sliding scale.  add lantus 6 units q HS     # Essential hypertension.   on lisinopril 10 mg , increase to 20 mg : now controlled   c/w procardia XL and Toprol XL     # History of transient ischemic attack (TIA).   ·  Plan: - cw ASA and statin.  neuro fu     dispo: once sod level is better , and urine c/s comes back , will plan for d/c to rehab     newton charles MD   covering Dr. Mason Murphy

## 2024-08-29 NOTE — PROGRESS NOTE ADULT - ASSESSMENT
79F with recent TIA (6 weeks ago),  SAH with IVH in 2015, DM, CAD, and HLD presents s/p fall. Pt was walking with assistance out of her home today when she fell slowly to the floor landing on her buttocks. No head trauma. C/o left sided weakness  L hemiparesis on exam, pain limited at L hip  A1C 6.1, LDL 79  TTE EF > 75%, no valvular abnormalities  orthostatics neg   s/p fall 8/26 AM, repeat CTh neg    Fall - mechanical ?deconditioning   Recent TIA - now with L hemiparesis , unclear if pain related vs stroke  UTI    - aspirin 81 and statin for secondary stroke prevention  - TTE as above  - Cardiology following   - MRI brain w/o as inpt vs outpt  - NSAIDs for L hip pain  - Nephro following for hyponatremia - ?SIADH, can be contributing to falls, Na worse today  - on levaquin for UTI  - PT/OT  - DVT ppx       Evonne Tenorio, DO  Vascular Neurology  Office [phone]

## 2024-08-30 LAB
ANION GAP SERPL CALC-SCNC: 10 MMOL/L — SIGNIFICANT CHANGE UP (ref 7–14)
BASOPHILS # BLD AUTO: 0.06 K/UL — SIGNIFICANT CHANGE UP (ref 0–0.2)
BASOPHILS NFR BLD AUTO: 0.5 % — SIGNIFICANT CHANGE UP (ref 0–2)
BUN SERPL-MCNC: 25 MG/DL — HIGH (ref 7–23)
CALCIUM SERPL-MCNC: 9 MG/DL — SIGNIFICANT CHANGE UP (ref 8.4–10.5)
CHLORIDE SERPL-SCNC: 101 MMOL/L — SIGNIFICANT CHANGE UP (ref 98–107)
CO2 SERPL-SCNC: 22 MMOL/L — SIGNIFICANT CHANGE UP (ref 22–31)
CREAT SERPL-MCNC: 1.08 MG/DL — SIGNIFICANT CHANGE UP (ref 0.5–1.3)
EGFR: 52 ML/MIN/1.73M2 — LOW
EOSINOPHIL # BLD AUTO: 0.46 K/UL — SIGNIFICANT CHANGE UP (ref 0–0.5)
EOSINOPHIL NFR BLD AUTO: 3.8 % — SIGNIFICANT CHANGE UP (ref 0–6)
GLUCOSE BLDC GLUCOMTR-MCNC: 126 MG/DL — HIGH (ref 70–99)
GLUCOSE BLDC GLUCOMTR-MCNC: 202 MG/DL — HIGH (ref 70–99)
GLUCOSE BLDC GLUCOMTR-MCNC: 219 MG/DL — HIGH (ref 70–99)
GLUCOSE BLDC GLUCOMTR-MCNC: 289 MG/DL — HIGH (ref 70–99)
GLUCOSE SERPL-MCNC: 182 MG/DL — HIGH (ref 70–99)
HCT VFR BLD CALC: 31.9 % — LOW (ref 34.5–45)
HGB BLD-MCNC: 10.4 G/DL — LOW (ref 11.5–15.5)
IANC: 7.88 K/UL — HIGH (ref 1.8–7.4)
IMM GRANULOCYTES NFR BLD AUTO: 0.5 % — SIGNIFICANT CHANGE UP (ref 0–0.9)
LYMPHOCYTES # BLD AUTO: 19.1 % — SIGNIFICANT CHANGE UP (ref 13–44)
LYMPHOCYTES # BLD AUTO: 2.3 K/UL — SIGNIFICANT CHANGE UP (ref 1–3.3)
MAGNESIUM SERPL-MCNC: 1.6 MG/DL — SIGNIFICANT CHANGE UP (ref 1.6–2.6)
MCHC RBC-ENTMCNC: 27.8 PG — SIGNIFICANT CHANGE UP (ref 27–34)
MCHC RBC-ENTMCNC: 32.6 GM/DL — SIGNIFICANT CHANGE UP (ref 32–36)
MCV RBC AUTO: 85.3 FL — SIGNIFICANT CHANGE UP (ref 80–100)
MONOCYTES # BLD AUTO: 1.3 K/UL — HIGH (ref 0–0.9)
MONOCYTES NFR BLD AUTO: 10.8 % — SIGNIFICANT CHANGE UP (ref 2–14)
NEUTROPHILS # BLD AUTO: 7.88 K/UL — HIGH (ref 1.8–7.4)
NEUTROPHILS NFR BLD AUTO: 65.3 % — SIGNIFICANT CHANGE UP (ref 43–77)
NRBC # BLD: 0 /100 WBCS — SIGNIFICANT CHANGE UP (ref 0–0)
NRBC # FLD: 0 K/UL — SIGNIFICANT CHANGE UP (ref 0–0)
PHOSPHATE SERPL-MCNC: 3.7 MG/DL — SIGNIFICANT CHANGE UP (ref 2.5–4.5)
PLATELET # BLD AUTO: 331 K/UL — SIGNIFICANT CHANGE UP (ref 150–400)
POTASSIUM SERPL-MCNC: 4.6 MMOL/L — SIGNIFICANT CHANGE UP (ref 3.5–5.3)
POTASSIUM SERPL-SCNC: 4.6 MMOL/L — SIGNIFICANT CHANGE UP (ref 3.5–5.3)
RBC # BLD: 3.74 M/UL — LOW (ref 3.8–5.2)
RBC # FLD: 12.5 % — SIGNIFICANT CHANGE UP (ref 10.3–14.5)
SODIUM SERPL-SCNC: 133 MMOL/L — LOW (ref 135–145)
WBC # BLD: 12.06 K/UL — HIGH (ref 3.8–10.5)
WBC # FLD AUTO: 12.06 K/UL — HIGH (ref 3.8–10.5)

## 2024-08-30 RX ADMIN — Medication 1 PATCH: at 21:39

## 2024-08-30 RX ADMIN — Medication 3: at 12:41

## 2024-08-30 RX ADMIN — ACETAMINOPHEN 650 MILLIGRAM(S): 325 TABLET ORAL at 23:43

## 2024-08-30 RX ADMIN — SODIUM BICARBONATE 650 MILLIGRAM(S): 84 INJECTION, SOLUTION INTRAVENOUS at 18:30

## 2024-08-30 RX ADMIN — Medication 2: at 18:26

## 2024-08-30 RX ADMIN — ACETAMINOPHEN 650 MILLIGRAM(S): 325 TABLET ORAL at 12:23

## 2024-08-30 RX ADMIN — Medication 3 UNIT(S): at 18:26

## 2024-08-30 RX ADMIN — Medication 3 UNIT(S): at 12:41

## 2024-08-30 RX ADMIN — Medication 81 MILLIGRAM(S): at 11:50

## 2024-08-30 RX ADMIN — Medication 40 MILLIGRAM(S): at 21:40

## 2024-08-30 RX ADMIN — CHLORHEXIDINE GLUCONATE 1 APPLICATION(S): 40 SOLUTION TOPICAL at 11:50

## 2024-08-30 RX ADMIN — NIFEDIPINE 90 MILLIGRAM(S): 60 TABLET, FILM COATED, EXTENDED RELEASE ORAL at 05:28

## 2024-08-30 RX ADMIN — Medication 3 UNIT(S): at 09:17

## 2024-08-30 RX ADMIN — Medication 2: at 09:16

## 2024-08-30 RX ADMIN — Medication 200 MILLIGRAM(S): at 18:27

## 2024-08-30 RX ADMIN — LISINOPRIL 20 MILLIGRAM(S): 10 TABLET ORAL at 05:27

## 2024-08-30 RX ADMIN — Medication 200 MILLIGRAM(S): at 05:27

## 2024-08-30 RX ADMIN — SODIUM BICARBONATE 650 MILLIGRAM(S): 84 INJECTION, SOLUTION INTRAVENOUS at 05:28

## 2024-08-30 RX ADMIN — Medication 1 PATCH: at 08:30

## 2024-08-30 RX ADMIN — POLYETHYLENE GLYCOL 3350 17 GRAM(S): 17 POWDER, FOR SOLUTION ORAL at 11:54

## 2024-08-30 RX ADMIN — Medication 100 MILLIGRAM(S): at 11:50

## 2024-08-30 RX ADMIN — INSULIN GLARGINE 12 UNIT(S): 100 INJECTION, SOLUTION SUBCUTANEOUS at 21:39

## 2024-08-30 RX ADMIN — ACETAMINOPHEN 650 MILLIGRAM(S): 325 TABLET ORAL at 23:13

## 2024-08-30 RX ADMIN — Medication 1 PATCH: at 09:00

## 2024-08-30 RX ADMIN — ACETAMINOPHEN 650 MILLIGRAM(S): 325 TABLET ORAL at 11:53

## 2024-08-30 NOTE — PROGRESS NOTE ADULT - ASSESSMENT
79-year-old female with PMH TIA 1 and half months ago, DM2, HTN, HLD presents for a fall. nephrology consulted for hyponatremia    Hyponatremia  follows up with Dr. Manzanares in office  seen in 2022 work up at that time was polydipsia  daughter also confirmed patient still drinks alot of water  repeat urine work up likely SIADH  normal tsh, cortisol level  free water restriction <1L/day, per patient is compliant    na improved s/p 1.5% and 2% saline  optimize dm control  monitor    htn  controlled  continue current meds  monitor    acidosis  non ag  restart biarb  better    weakness  work up per team    leukocytosis  better  likely UTI  started on iv levo  work up per team

## 2024-08-30 NOTE — PROGRESS NOTE ADULT - ASSESSMENT
79 /o F with pmh of TIA (1.5 months ago), DM type 2, HTN, HLD p/w fall.      UTI :   leukocytosis   c/o dysuria   on levaquin IV   urin is growing Ecoli : sensitivity pending   wbc improving on levaquin     # Left hip pain 2/2 OA  improved with tylenol   c/w tylenol     # S/P fall/ found on floor   -s/p CT head : neg for any ac changes   -fall precaution    # hyponatremia , hx of polydipsia   decreasing , s/p Tolvaptan   today sod 133  restarted on bicarb for acidosis     # Type 2 diabetes mellitus.   ·  Plan: - hold metformin  FSBS are high   - Lispro sliding scale.  add lantus 6 units q HS     # Essential hypertension.   on lisinopril 10 mg , increase to 20 mg : now controlled   c/w procardia XL and Toprol XL     # History of transient ischemic attack (TIA).   ·  Plan: - cw ASA and statin.  neuro fu     dispo: awaiting urine c/s identification , then will adjust abx and d/c planning    newton charles MD   covering Dr. Mason Murphy

## 2024-08-30 NOTE — PROGRESS NOTE ADULT - NS ATTEND AMEND GEN_ALL_CORE FT
as above
sodium bicarb as above
TOLVAPTAN 15mg TODAY   Repeat BMP at 4 pm
as above
corrected sodium 128  monitor as above on fluid restriction and salt tab  optimize glucose
improving with 2 %
1.5 sodium chloride as above  Repeat BMP at 4 pm and call Nephology with results
pending labs today
sodium improving

## 2024-08-31 LAB
-  AMPICILLIN/SULBACTAM: SIGNIFICANT CHANGE UP
-  AMPICILLIN: SIGNIFICANT CHANGE UP
-  AZTREONAM: SIGNIFICANT CHANGE UP
-  CEFAZOLIN: SIGNIFICANT CHANGE UP
-  CEFEPIME: SIGNIFICANT CHANGE UP
-  CEFTRIAXONE: SIGNIFICANT CHANGE UP
-  CEFUROXIME: SIGNIFICANT CHANGE UP
-  CIPROFLOXACIN: SIGNIFICANT CHANGE UP
-  ERTAPENEM: SIGNIFICANT CHANGE UP
-  GENTAMICIN: SIGNIFICANT CHANGE UP
-  IMIPENEM: SIGNIFICANT CHANGE UP
-  LEVOFLOXACIN: SIGNIFICANT CHANGE UP
-  MEROPENEM: SIGNIFICANT CHANGE UP
-  NITROFURANTOIN: SIGNIFICANT CHANGE UP
-  PIPERACILLIN/TAZOBACTAM: SIGNIFICANT CHANGE UP
-  TOBRAMYCIN: SIGNIFICANT CHANGE UP
-  TRIMETHOPRIM/SULFAMETHOXAZOLE: SIGNIFICANT CHANGE UP
ANION GAP SERPL CALC-SCNC: 11 MMOL/L — SIGNIFICANT CHANGE UP (ref 7–14)
BASOPHILS # BLD AUTO: 0.06 K/UL — SIGNIFICANT CHANGE UP (ref 0–0.2)
BASOPHILS NFR BLD AUTO: 0.5 % — SIGNIFICANT CHANGE UP (ref 0–2)
BUN SERPL-MCNC: 21 MG/DL — SIGNIFICANT CHANGE UP (ref 7–23)
CALCIUM SERPL-MCNC: 9.1 MG/DL — SIGNIFICANT CHANGE UP (ref 8.4–10.5)
CHLORIDE SERPL-SCNC: 97 MMOL/L — LOW (ref 98–107)
CO2 SERPL-SCNC: 23 MMOL/L — SIGNIFICANT CHANGE UP (ref 22–31)
CREAT SERPL-MCNC: 0.99 MG/DL — SIGNIFICANT CHANGE UP (ref 0.5–1.3)
CULTURE RESULTS: ABNORMAL
EGFR: 58 ML/MIN/1.73M2 — LOW
EOSINOPHIL # BLD AUTO: 0.67 K/UL — HIGH (ref 0–0.5)
EOSINOPHIL NFR BLD AUTO: 5.9 % — SIGNIFICANT CHANGE UP (ref 0–6)
GLUCOSE BLDC GLUCOMTR-MCNC: 169 MG/DL — HIGH (ref 70–99)
GLUCOSE BLDC GLUCOMTR-MCNC: 235 MG/DL — HIGH (ref 70–99)
GLUCOSE BLDC GLUCOMTR-MCNC: 241 MG/DL — HIGH (ref 70–99)
GLUCOSE BLDC GLUCOMTR-MCNC: 305 MG/DL — HIGH (ref 70–99)
GLUCOSE SERPL-MCNC: 157 MG/DL — HIGH (ref 70–99)
HCT VFR BLD CALC: 30.9 % — LOW (ref 34.5–45)
HGB BLD-MCNC: 10 G/DL — LOW (ref 11.5–15.5)
IANC: 6.6 K/UL — SIGNIFICANT CHANGE UP (ref 1.8–7.4)
IMM GRANULOCYTES NFR BLD AUTO: 0.8 % — SIGNIFICANT CHANGE UP (ref 0–0.9)
LYMPHOCYTES # BLD AUTO: 2.61 K/UL — SIGNIFICANT CHANGE UP (ref 1–3.3)
LYMPHOCYTES # BLD AUTO: 23.1 % — SIGNIFICANT CHANGE UP (ref 13–44)
MAGNESIUM SERPL-MCNC: 1.8 MG/DL — SIGNIFICANT CHANGE UP (ref 1.6–2.6)
MCHC RBC-ENTMCNC: 28.1 PG — SIGNIFICANT CHANGE UP (ref 27–34)
MCHC RBC-ENTMCNC: 32.4 GM/DL — SIGNIFICANT CHANGE UP (ref 32–36)
MCV RBC AUTO: 86.8 FL — SIGNIFICANT CHANGE UP (ref 80–100)
METHOD TYPE: SIGNIFICANT CHANGE UP
MONOCYTES # BLD AUTO: 1.27 K/UL — HIGH (ref 0–0.9)
MONOCYTES NFR BLD AUTO: 11.2 % — SIGNIFICANT CHANGE UP (ref 2–14)
NEUTROPHILS # BLD AUTO: 6.6 K/UL — SIGNIFICANT CHANGE UP (ref 1.8–7.4)
NEUTROPHILS NFR BLD AUTO: 58.5 % — SIGNIFICANT CHANGE UP (ref 43–77)
NRBC # BLD: 0 /100 WBCS — SIGNIFICANT CHANGE UP (ref 0–0)
NRBC # FLD: 0 K/UL — SIGNIFICANT CHANGE UP (ref 0–0)
ORGANISM # SPEC MICROSCOPIC CNT: ABNORMAL
ORGANISM # SPEC MICROSCOPIC CNT: ABNORMAL
PHOSPHATE SERPL-MCNC: 3.7 MG/DL — SIGNIFICANT CHANGE UP (ref 2.5–4.5)
PLATELET # BLD AUTO: 334 K/UL — SIGNIFICANT CHANGE UP (ref 150–400)
POTASSIUM SERPL-MCNC: 4.5 MMOL/L — SIGNIFICANT CHANGE UP (ref 3.5–5.3)
POTASSIUM SERPL-SCNC: 4.5 MMOL/L — SIGNIFICANT CHANGE UP (ref 3.5–5.3)
RBC # BLD: 3.56 M/UL — LOW (ref 3.8–5.2)
RBC # FLD: 12.8 % — SIGNIFICANT CHANGE UP (ref 10.3–14.5)
SODIUM SERPL-SCNC: 131 MMOL/L — LOW (ref 135–145)
SPECIMEN SOURCE: SIGNIFICANT CHANGE UP
WBC # BLD: 11.3 K/UL — HIGH (ref 3.8–10.5)
WBC # FLD AUTO: 11.3 K/UL — HIGH (ref 3.8–10.5)

## 2024-08-31 PROCEDURE — 99222 1ST HOSP IP/OBS MODERATE 55: CPT

## 2024-08-31 RX ORDER — ERTAPENEM SODIUM 1 G/1
1000 INJECTION, POWDER, LYOPHILIZED, FOR SOLUTION INTRAMUSCULAR; INTRAVENOUS EVERY 24 HOURS
Refills: 0 | Status: COMPLETED | OUTPATIENT
Start: 2024-08-31 | End: 2024-09-02

## 2024-08-31 RX ADMIN — Medication 1 PATCH: at 09:21

## 2024-08-31 RX ADMIN — ACETAMINOPHEN 650 MILLIGRAM(S): 325 TABLET ORAL at 15:49

## 2024-08-31 RX ADMIN — ERTAPENEM SODIUM 120 MILLIGRAM(S): 1 INJECTION, POWDER, LYOPHILIZED, FOR SOLUTION INTRAMUSCULAR; INTRAVENOUS at 15:50

## 2024-08-31 RX ADMIN — Medication 40 MILLIGRAM(S): at 22:08

## 2024-08-31 RX ADMIN — ACETAMINOPHEN 650 MILLIGRAM(S): 325 TABLET ORAL at 16:30

## 2024-08-31 RX ADMIN — Medication 3 UNIT(S): at 18:28

## 2024-08-31 RX ADMIN — Medication 1 PATCH: at 06:42

## 2024-08-31 RX ADMIN — INSULIN GLARGINE 12 UNIT(S): 100 INJECTION, SOLUTION SUBCUTANEOUS at 22:07

## 2024-08-31 RX ADMIN — Medication 3 UNIT(S): at 13:27

## 2024-08-31 RX ADMIN — Medication 200 MILLIGRAM(S): at 15:50

## 2024-08-31 RX ADMIN — Medication 200 MILLIGRAM(S): at 05:03

## 2024-08-31 RX ADMIN — Medication 30 MILLILITER(S): at 13:26

## 2024-08-31 RX ADMIN — NIFEDIPINE 90 MILLIGRAM(S): 60 TABLET, FILM COATED, EXTENDED RELEASE ORAL at 05:03

## 2024-08-31 RX ADMIN — Medication 2: at 18:28

## 2024-08-31 RX ADMIN — CHLORHEXIDINE GLUCONATE 1 APPLICATION(S): 40 SOLUTION TOPICAL at 12:57

## 2024-08-31 RX ADMIN — Medication 2: at 08:53

## 2024-08-31 RX ADMIN — SODIUM BICARBONATE 650 MILLIGRAM(S): 84 INJECTION, SOLUTION INTRAVENOUS at 05:02

## 2024-08-31 RX ADMIN — Medication 4: at 13:27

## 2024-08-31 RX ADMIN — Medication 81 MILLIGRAM(S): at 12:57

## 2024-08-31 RX ADMIN — Medication 1 PATCH: at 22:08

## 2024-08-31 RX ADMIN — LISINOPRIL 20 MILLIGRAM(S): 10 TABLET ORAL at 05:03

## 2024-08-31 RX ADMIN — POLYETHYLENE GLYCOL 3350 17 GRAM(S): 17 POWDER, FOR SOLUTION ORAL at 12:57

## 2024-08-31 RX ADMIN — Medication 100 MILLIGRAM(S): at 12:57

## 2024-08-31 RX ADMIN — Medication 3 UNIT(S): at 08:52

## 2024-08-31 RX ADMIN — SODIUM BICARBONATE 650 MILLIGRAM(S): 84 INJECTION, SOLUTION INTRAVENOUS at 15:50

## 2024-08-31 NOTE — CONSULT NOTE ADULT - SUBJECTIVE AND OBJECTIVE BOX
Patient is a 79y old  Female who presents with a chief complaint of Fall (31 Aug 2024 07:34)    HPI:  79 /o F with pmh of TIA (1.5 months ago), DM type 2, HTN, HLD p/w fall.  Pt was walking with assistance out of her home today when she fell slowly to the floor landing on her buttocks.  She had no head trauma.  No LOC.  She has had L hip pain over the past few days which has made it more difficult to walk, and states that hip pain contributed to her falling today.  Daughter witnessed the fall and states pt fell to the floor "in slow motion."  Pt has felt very weak chronically and now has difficulty ambulating even with the assistance of 2 people.  She has 3-4 other falls in the past several months.  She has HHA for part of the day, but is having difficulty with ADL's when HHA not there.  Pt also reports headache, today.  She took her BP at home and found that SBP was in180's which improved after taking her home metoprolol.  Pt has not had dyspnea.  She reports mild occasional chest pain.  No palpitations, dizziness, fever, chills, n/v/d.      In the ED, BP was 194/79, and pt was given amlodipine.  She was also given 1 L NS, Tylenol, and lidocaine patch.   (19 Aug 2024 22:31)       REVIEW OF SYSTEMS  Constitutional: No fevers, chills, weight loss or fatigue   Skin: No rash, no phlebitis	  Eyes: No discharge	  ENMT: No sore throat, oral thrush, ulcers or exudate  Respiratory: No cough, no SOB  Cardiovascular:  No chest pain, palpitations or edema   Gastrointestinal: No pain, nausea, vomiting, diarrhea or constipation	  Genitourinary: +urinary frequency, suprapubic pain  MSK: No arthralgias or back pain   Neurological: No HA, no weakness, no seizures, no AMS       prior hospital charts reviewed [V]  primary team notes reviewed [V]  other consultant notes reviewed [V]    PAST MEDICAL & SURGICAL HISTORY:  Abdominal obesity      Benign essential hypertension      CAD (coronary atherosclerotic disease)  s/p stent      DM type 2 (diabetes mellitus, type 2)      Dyslipidemia      H/O: hysterectomy      S/P  Section      S/P hemorrhoidectomy      History of cholecystectomy    SOCIAL HISTORY:  - Denied smoking/vaping/alcohol/recreational drug use    FAMILY HISTORY:  No pertinent family history in first degree relatives    Allergies  penicillin (Rash)  sulfa drugs (Rash)  Sulfur (Anaphylaxis)  penicillin (Unknown)  penicillin (Anaphylaxis)  sulfa drugs (Unknown)      ANTIMICROBIALS:      ANTIMICROBIALS (past 90 days):  MEDICATIONS  (STANDING):    levoFLOXacin IVPB   750 milliGRAM(s) IV Intermittent (24 @ 01:50)    levoFLOXacin IVPB   100 mL/Hr IV Intermittent (24 @ 21:39)   100 mL/Hr IV Intermittent (24 @ 23:30)   100 mL/Hr IV Intermittent (24 @ 23:37)        OTHER MEDS:   MEDICATIONS  (STANDING):  acetaminophen     Tablet .. 650 every 6 hours PRN  aluminum hydroxide/magnesium hydroxide/simethicone Suspension 30 every 4 hours PRN  aspirin  chewable 81 daily  atorvastatin 40 at bedtime  dextrose 50% Injectable 25 once  dextrose 50% Injectable 12.5 once  dextrose 50% Injectable 25 once  dextrose Oral Gel 15 once PRN  gabapentin 100 daily  glucagon  Injectable 1 once  insulin glargine Injectable (LANTUS) 12 at bedtime  insulin lispro (ADMELOG) corrective regimen sliding scale  three times a day before meals  insulin lispro (ADMELOG) corrective regimen sliding scale  at bedtime  insulin lispro Injectable (ADMELOG) 3 three times a day before meals  labetalol 200 two times a day  lisinopril 20 daily  NIFEdipine XL 90 daily  oxyCODONE    IR 2.5 every 8 hours PRN  polyethylene glycol 3350 17 daily      VITALS:  Vital Signs Last 24 Hrs  T(F): 97.7 (24 @ 05:00), Max: 99.3 (24 @ 17:26)    Vital Signs Last 24 Hrs  HR: 67 (24 @ 05:00) (67 - 78)  BP: 149/56 (24 @ 05:00) (120/48 - 154/65)  RR: 18 (24 @ 05:00)  SpO2: 99% (24 @ 05:00) (99% - 100%)  Wt(kg): --    EXAM:  General: Patient in no acute distress  HEENT: NCAT, EOMI, PERRL, no oral lesions  CV: S1+S2, no m/r/g appreciated   Lungs: No respiratory distress, CTAB  Abd: Soft, mild suprapubic tenderness, no CVA tenderness  Ext: No cyanosis, no edema  Neuro: Alert and oriented  Skin: No rash   IV: No phlebitis      Labs:                        10.0   11.30 )-----------( 334      ( 31 Aug 2024 05:05 )             30.9         131<L>  |  97<L>  |  21  ----------------------------<  157<H>  4.5   |  23  |  0.99    Ca    9.1      31 Aug 2024 05:05  Phos  3.7       Mg     1.80             WBC Trend:  WBC Count: 11.30 (24 @ 05:05)  WBC Count: 12.06 (24 @ 06:10)  WBC Count: 12.67 (24 @ 06:30)  WBC Count: 15.84 (24 @ 05:16)      Auto Neutrophil #: 6.60 K/uL (24 @ 05:05)  Auto Neutrophil #: 7.88 K/uL (24 @ 06:10)  Auto Neutrophil #: 8.12 K/uL (24 @ 06:30)  Auto Neutrophil #: 10.62 K/uL (24 @ 05:16)  Auto Neutrophil #: 21.97 K/uL (24 @ 05:41)      Creatine Trend:  Creatinine: 0.99 ()  Creatinine: 1.08 ()  Creatinine: 1.20 ()  Creatinine: 1.15 ()      Liver Biochemical Testing Trend:  Alanine Aminotransferase (ALT/SGPT): 9 ()  Alanine Aminotransferase (ALT/SGPT): 11 ()  Alanine Aminotransferase (ALT/SGPT): 19 ()  Aspartate Aminotransferase (AST/SGOT): 11 (24 @ 13:00)  Aspartate Aminotransferase (AST/SGOT): 14 (24 @ 14:37)  Aspartate Aminotransferase (AST/SGOT): 11 (24 @ 22:37)  Bilirubin Total: 0.3 ()  Bilirubin Total: 0.3 (06-09)  Bilirubin Total: 0.4 (-29)    Auto Eosinophil %: 5.9 % (24 @ 05:05)  Auto Eosinophil %: 3.8 % (24 @ 06:10)  Auto Eosinophil %: 2.2 % (24 @ 06:30)      Urinalysis Basic - ( 31 Aug 2024 05:05 )    Color: x / Appearance: x / SG: x / pH: x  Gluc: 157 mg/dL / Ketone: x  / Bili: x / Urobili: x   Blood: x / Protein: x / Nitrite: x   Leuk Esterase: x / RBC: x / WBC x   Sq Epi: x / Non Sq Epi: x / Bacteria: x    MICROBIOLOGY:    MRSA PCR Result.: Detected (24 @ 15:49)    Culture - Urine (collected 27 Aug 2024 18:47)  Source: Catheterized Catheterized  Final Report:    >100,000 CFU/ml Escherichia coli ESBL  Organism: Escherichia coli ESBL  Organism: Escherichia coli ESBL    Sensitivities:      Method Type: BALJINDER      -  Ampicillin: R >16 These ampicillin results predict results for amoxicillin      -  Ampicillin/Sulbactam: R >16/8      -  Aztreonam: R 8      -  Cefazolin: R >16 For uncomplicated UTI with K. pneumoniae, E. coli, or P. mirablis: BALJINDER <=16 is sensitive and BALJINDER >=32 is resistant. This also predicts results for oral agents cefaclor, cefdinir, cefpodoxime, cefprozil, cefuroxime axetil, cephalexin and locarbef for uncomplicated UTI. Note that some isolates may be susceptible to these agents while testing resistant to cefazolin.      -  Cefepime: R 16      -  Ceftriaxone: R >32      -  Cefuroxime: R >16      -  Ciprofloxacin: R >2      -  Ertapenem: S <=0.5      -  Gentamicin: S <=2      -  Imipenem: S <=1      -  Levofloxacin: R >4      -  Meropenem: S <=1      -  Nitrofurantoin: S <=32 Should not be used to treat pyelonephritis      -  Piperacillin/Tazobactam: S <=8      -  Tobramycin: S <=2      -  Trimethoprim/Sulfamethoxazole: S <=0.5/9.5    Culture - Blood (collected 27 Aug 2024 16:25)  Source: .Blood Blood-Venous  Preliminary Report:    No growth at 72 Hours    Culture - Blood (collected 27 Aug 2024 16:15)  Source: .Blood Blood-Peripheral  Preliminary Report:    No growth at 72 Hours    Urinalysis with Rflx Culture (collected 19 Aug 2024 17:27)    Urinalysis with Rflx Culture (collected 29 May 2024 22:37)    Culture - Urine (collected 12 Mar 2023 19:50)  Source: Clean Catch Clean Catch (Midstream)  Final Report:    10,000 - 49,000 CFU/mL Escherichia coli  Organism: Escherichia coli  Organism: Escherichia coli    Sensitivities:      Method Type: BALJINDER      -  Amikacin: S <=16      -  Amoxicillin/Clavulanic Acid: S <=8/4      -  Ampicillin: R >16 These ampicillin results predict results for amoxicillin      -  Ampicillin/Sulbactam: I 16/8 Enterobacter, Klebsiella aerogenes, Citrobacter, and Serratia may develop resistance during prolonged therapy (3-4 days)      -  Aztreonam: S <=4      -  Cefazolin: S <=2 For uncomplicated UTI with K. pneumoniae, E. coli, or P. mirablis: BALJINDER <=16 is sensitive and BALJINDER >=32 is resistant. This also predicts results for oral agents cefaclor, cefdinir, cefpodoxime, cefprozil, cefuroxime axetil, cephalexin and locarbef for uncomplicated UTI. Note that some isolates may be susceptible to these agents while testing resistant to cefazolin.      -  Cefepime: S <=2      -  Cefoxitin: S <=8      -  Ceftriaxone: S <=1 Enterobacter, Klebsiella aerogenes, Citrobacter, and Serratia may develop resistance during prolonged therapy      -  Cefuroxime: S <=4      -  Ciprofloxacin: S <=0.25      -  Ertapenem: S <=0.5      -  Gentamicin: S <=2      -  Imipenem: S <=1      -  Levofloxacin: S <=0.5      -  Meropenem: S <=1      -  Nitrofurantoin: S <=32 Should not be used to treat pyelonephritis      -  Piperacillin/Tazobactam: S <=8      -  Tobramycin: S <=2      -  Trimethoprim/Sulfamethoxazole: S <=0.5/9.5    A1C with Estimated Average Glucose Result: 6.1 % (24 @ 07:00)      RADIOLOGY:  imaging below personally reviewed    < from: Xray Chest 1 View- PORTABLE-Urgent (Xray Chest 1 View- PORTABLE-Urgent .) (24 @ 05:51) >  IMPRESSION: Clear lungs.    < end of copied text >  < from: Xray Shoulder 2 Views, Left (24 @ 14:44) >  IMPRESSION:  No fractures, dislocations, or AC separation.    AC joint arthrosis. Preserved glenohumeral joint space.    Acromial undersurface bony spurring protruding into subacromial space   could correlate with or predispose to a subacromial impingement process,   MRI would be helpful to further assess in this regard as warranted.    No destructive osseous lesions or periosteal reaction.    No periarticular soft tissue calcifications.    < end of copied text >  < from: CT Head No Cont (24 @ 01:21) >  IMPRESSION:  Moderate chronicmicrovascular changes without evidence of   an acute transcortical infarction or hemorrhage.    < end of copied text >  < from: CT Abdomen and Pelvis w/ IV Cont (24 @ 15:40) >  IMPRESSION:  Etiology of the abdominal pain is not elucidated on this exam.    Mild biliary and main pancreatic duct dilatation, slightly increased   compared to prior 2020. Consider further evaluation with MRI/MRCP.   No discrete mass identified on this exam.    Chronic appearing superior endplate compression deformity of the L5   vertebral body, however new compared to prior 2020.    < end of copied text >

## 2024-08-31 NOTE — CONSULT NOTE ADULT - ATTENDING COMMENTS
This is a 80 y/o F w/ PMHx of TIA, DM2, HTN, HLD admitted with fall on 8/19, was planned for dispo rehab. On 8/26, noted to have leukocytosis to 23, U/A on 8/27 with significant pyuria to 1038, UCx w/ ESBL E Coli, started empirically on Levofloxacin.  BCx negative. WBC improving prior to initiation of Levofloxacin, started on 8/27.     #UTI  #ESBL E Coli   #PCN allergy, rash?    Plan:   1. Ertapenem 1 g q24 x 3 days for UTI    Thank you for this consult. Inpatient ID consult team will sign off.    Further changes in lab values, imaging studies, or clinical status will not be known to ID inpatient consultants unless specifically communicated by primary team.    Eris Casey MD  Attending Physician  Division of Infectious Diseases  Department of Medicine    Please contact through MS Teams message.  Office: [phone] (after 5 PM or weekend) Negative

## 2024-08-31 NOTE — PROGRESS NOTE ADULT - ASSESSMENT
79-year-old female with PMH TIA 1 and half months ago, DM2, HTN, HLD presents for a fall. nephrology consulted for hyponatremia    Hyponatremia  She follows up with Dr. Manzanares in office  Seen in 2022, work up at that time was polydipsia  Her daughter also confirmed patient still drinks alot of water  Repeat urine work up likely SIADH, with normal TSH and cortisol level  - free water restriction <1L/day, per patient is compliant    - optimize dm control  - monitor BMP daily now.    htn  controlled  continue current meds  monitor    acidosis  non ag  restart biarb  better    weakness  work up per team    leukocytosis  better  likely UTI  started on iv levo  work up per team

## 2024-08-31 NOTE — PROGRESS NOTE ADULT - ASSESSMENT
79 /o F with pmh of TIA (1.5 months ago), DM type 2, HTN, HLD p/w fall.      UTI :   leukocytosis : improving   c/o dysuria   urin is growing Ecoli : ESBL resistant to levaquin   d/c levaquin , ID consulted : f/u recommendation     # Left hip pain 2/2 OA  improved with tylenol   c/w tylenol     # S/P fall/ found on floor   -s/p CT head : neg for any ac changes   -fall precaution    # hyponatremia , hx of polydipsia   decreasing , s/p Tolvaptan   today sod 133  restarted on bicarb for acidosis     # Type 2 diabetes mellitus.   ·  Plan: - hold metformin  FSBS are high   - Lispro sliding scale.  add lantus 6 units q HS     # Essential hypertension.   on lisinopril 10 mg , increase to 20 mg : now controlled   c/w procardia XL and Toprol XL     # History of transient ischemic attack (TIA).   ·  Plan: - cw ASA and statin.  neuro fu     dispo: awaiting urine c/s identification , then will adjust abx and d/c planning    newton charles MD   covering Dr. Mason Murphy

## 2024-08-31 NOTE — CONSULT NOTE ADULT - ASSESSMENT
79F with recent TIA (6 weeks ago),  SAH with IVH in 2015, DM, CAD, and HLD who presented after a fall.    ID is consulted for a positive urine culture  Urine culture is growing ESBL E. Coli, sensitive also to gentamicin and nitrofurantoin, resistant to FQs  UA is grossly positive  She has suprapubic pain and urinary frequency  She has been afebrile  There is a mild leukocytosis which has overall improved from prior  She reports skin rash to penicillin and sulfa drugs which she reports was many years ago but denied lip swelling/hospitalization for these.    #ESBL E. Coli UTI  #Fall  #Leukocytosis 79F with recent TIA (6 weeks ago),  SAH with IVH in 2015, DM, CAD, and HLD who presented after a fall.    ID is consulted for a positive urine culture  Urine culture is growing ESBL E. Coli, sensitive also to gentamicin and nitrofurantoin, resistant to FQs  UA is grossly positive  She has suprapubic pain and urinary frequency  She has been afebrile  There is a mild leukocytosis which has overall improved from prior  She reports skin rash to penicillin and sulfa drugs which she reports was many years ago but denied lip swelling/hospitalization for these.    #ESBL E. Coli UTI  #Fall  #Leukocytosis    - ertapenem 1g q24h for 3 days course  - monitor WBC and temp curves    d/w attending.    Jesu Rodgers, PGY5  ID Fellow  [employer] Teams Preferred  After 5pm/weekends call [phone]

## 2024-09-01 LAB
ANION GAP SERPL CALC-SCNC: 16 MMOL/L — HIGH (ref 7–14)
BASOPHILS # BLD AUTO: 0.04 K/UL — SIGNIFICANT CHANGE UP (ref 0–0.2)
BASOPHILS NFR BLD AUTO: 0.3 % — SIGNIFICANT CHANGE UP (ref 0–2)
BUN SERPL-MCNC: 16 MG/DL — SIGNIFICANT CHANGE UP (ref 7–23)
CALCIUM SERPL-MCNC: 9.1 MG/DL — SIGNIFICANT CHANGE UP (ref 8.4–10.5)
CHLORIDE SERPL-SCNC: 96 MMOL/L — LOW (ref 98–107)
CO2 SERPL-SCNC: 20 MMOL/L — LOW (ref 22–31)
CREAT SERPL-MCNC: 0.91 MG/DL — SIGNIFICANT CHANGE UP (ref 0.5–1.3)
EGFR: 64 ML/MIN/1.73M2 — SIGNIFICANT CHANGE UP
EOSINOPHIL # BLD AUTO: 0.39 K/UL — SIGNIFICANT CHANGE UP (ref 0–0.5)
EOSINOPHIL NFR BLD AUTO: 2.6 % — SIGNIFICANT CHANGE UP (ref 0–6)
GLUCOSE BLDC GLUCOMTR-MCNC: 182 MG/DL — HIGH (ref 70–99)
GLUCOSE BLDC GLUCOMTR-MCNC: 214 MG/DL — HIGH (ref 70–99)
GLUCOSE BLDC GLUCOMTR-MCNC: 231 MG/DL — HIGH (ref 70–99)
GLUCOSE BLDC GLUCOMTR-MCNC: 298 MG/DL — HIGH (ref 70–99)
GLUCOSE SERPL-MCNC: 165 MG/DL — HIGH (ref 70–99)
HCT VFR BLD CALC: 32.7 % — LOW (ref 34.5–45)
HGB BLD-MCNC: 10.9 G/DL — LOW (ref 11.5–15.5)
IANC: 9.89 K/UL — HIGH (ref 1.8–7.4)
IMM GRANULOCYTES NFR BLD AUTO: 0.6 % — SIGNIFICANT CHANGE UP (ref 0–0.9)
LYMPHOCYTES # BLD AUTO: 20.9 % — SIGNIFICANT CHANGE UP (ref 13–44)
LYMPHOCYTES # BLD AUTO: 3.11 K/UL — SIGNIFICANT CHANGE UP (ref 1–3.3)
MAGNESIUM SERPL-MCNC: 1.7 MG/DL — SIGNIFICANT CHANGE UP (ref 1.6–2.6)
MCHC RBC-ENTMCNC: 28.5 PG — SIGNIFICANT CHANGE UP (ref 27–34)
MCHC RBC-ENTMCNC: 33.3 GM/DL — SIGNIFICANT CHANGE UP (ref 32–36)
MCV RBC AUTO: 85.4 FL — SIGNIFICANT CHANGE UP (ref 80–100)
MONOCYTES # BLD AUTO: 1.39 K/UL — HIGH (ref 0–0.9)
MONOCYTES NFR BLD AUTO: 9.3 % — SIGNIFICANT CHANGE UP (ref 2–14)
NEUTROPHILS # BLD AUTO: 9.89 K/UL — HIGH (ref 1.8–7.4)
NEUTROPHILS NFR BLD AUTO: 66.3 % — SIGNIFICANT CHANGE UP (ref 43–77)
NRBC # BLD: 0 /100 WBCS — SIGNIFICANT CHANGE UP (ref 0–0)
NRBC # FLD: 0 K/UL — SIGNIFICANT CHANGE UP (ref 0–0)
PHOSPHATE SERPL-MCNC: 3.4 MG/DL — SIGNIFICANT CHANGE UP (ref 2.5–4.5)
PLATELET # BLD AUTO: 340 K/UL — SIGNIFICANT CHANGE UP (ref 150–400)
POTASSIUM SERPL-MCNC: 4.4 MMOL/L — SIGNIFICANT CHANGE UP (ref 3.5–5.3)
POTASSIUM SERPL-SCNC: 4.4 MMOL/L — SIGNIFICANT CHANGE UP (ref 3.5–5.3)
RBC # BLD: 3.83 M/UL — SIGNIFICANT CHANGE UP (ref 3.8–5.2)
RBC # FLD: 12.6 % — SIGNIFICANT CHANGE UP (ref 10.3–14.5)
SODIUM SERPL-SCNC: 132 MMOL/L — LOW (ref 135–145)
WBC # BLD: 14.91 K/UL — HIGH (ref 3.8–10.5)
WBC # FLD AUTO: 14.91 K/UL — HIGH (ref 3.8–10.5)

## 2024-09-01 RX ORDER — SENNA 187 MG
2 TABLET ORAL AT BEDTIME
Refills: 0 | Status: DISCONTINUED | OUTPATIENT
Start: 2024-09-01 | End: 2024-09-02

## 2024-09-01 RX ORDER — POLYETHYLENE GLYCOL 3350 17 G/17G
17 POWDER, FOR SOLUTION ORAL
Refills: 0 | Status: DISCONTINUED | OUTPATIENT
Start: 2024-09-01 | End: 2024-09-02

## 2024-09-01 RX ADMIN — CHLORHEXIDINE GLUCONATE 1 APPLICATION(S): 40 SOLUTION TOPICAL at 11:36

## 2024-09-01 RX ADMIN — Medication 30 MILLILITER(S): at 17:51

## 2024-09-01 RX ADMIN — Medication 1 PATCH: at 10:00

## 2024-09-01 RX ADMIN — Medication 200 MILLIGRAM(S): at 06:01

## 2024-09-01 RX ADMIN — Medication 3: at 13:36

## 2024-09-01 RX ADMIN — Medication 2 TABLET(S): at 21:46

## 2024-09-01 RX ADMIN — LISINOPRIL 20 MILLIGRAM(S): 10 TABLET ORAL at 06:01

## 2024-09-01 RX ADMIN — Medication 3 UNIT(S): at 13:36

## 2024-09-01 RX ADMIN — Medication 40 MILLIGRAM(S): at 21:46

## 2024-09-01 RX ADMIN — NIFEDIPINE 90 MILLIGRAM(S): 60 TABLET, FILM COATED, EXTENDED RELEASE ORAL at 06:01

## 2024-09-01 RX ADMIN — Medication 200 MILLIGRAM(S): at 17:51

## 2024-09-01 RX ADMIN — Medication 1: at 18:17

## 2024-09-01 RX ADMIN — Medication 3 UNIT(S): at 09:35

## 2024-09-01 RX ADMIN — ERTAPENEM SODIUM 120 MILLIGRAM(S): 1 INJECTION, POWDER, LYOPHILIZED, FOR SOLUTION INTRAMUSCULAR; INTRAVENOUS at 14:54

## 2024-09-01 RX ADMIN — INSULIN GLARGINE 12 UNIT(S): 100 INJECTION, SOLUTION SUBCUTANEOUS at 21:45

## 2024-09-01 RX ADMIN — SODIUM BICARBONATE 650 MILLIGRAM(S): 84 INJECTION, SOLUTION INTRAVENOUS at 18:16

## 2024-09-01 RX ADMIN — Medication 1 PATCH: at 21:50

## 2024-09-01 RX ADMIN — Medication 2: at 09:35

## 2024-09-01 RX ADMIN — Medication 30 MILLILITER(S): at 22:52

## 2024-09-01 RX ADMIN — Medication 3 UNIT(S): at 18:18

## 2024-09-01 RX ADMIN — Medication 100 MILLIGRAM(S): at 11:35

## 2024-09-01 RX ADMIN — SODIUM BICARBONATE 650 MILLIGRAM(S): 84 INJECTION, SOLUTION INTRAVENOUS at 06:01

## 2024-09-01 RX ADMIN — Medication 81 MILLIGRAM(S): at 11:35

## 2024-09-01 NOTE — DISCHARGE NOTE PROVIDER - DISCHARGE DIET
DASH Diet/Consistent Carbohydrate Diabetic Diets DASH Diet/Consistent Carbohydrate Diabetic Diets/Other Diet Instructions

## 2024-09-01 NOTE — PROGRESS NOTE ADULT - ASSESSMENT
79 /o F with pmh of TIA (1.5 months ago), DM type 2, HTN, HLD p/w fall.      UTI :   leukocytosis : improving   c/o dysuria   urin is growing Ecoli : ESBL resistant to levaquin   d/c levaquin , ID consulted : f/u recommendation     # Left hip pain 2/2 OA  improved with tylenol   c/w tylenol     # S/P fall/ found on floor   -s/p CT head : neg for any ac changes   -fall precaution    # hyponatremia , hx of polydipsia   decreasing , s/p Tolvaptan   today sod 133  restarted on bicarb for acidosis     # Type 2 diabetes mellitus.   ·  Plan: - hold metformin  FSBS are high   - Lispro sliding scale.  add lantus 6 units q HS     # Essential hypertension.   on lisinopril 10 mg , increase to 20 mg : now controlled   c/w procardia XL and Toprol XL     # History of transient ischemic attack (TIA).   ·  Plan: - cw ASA and statin.  neuro fu

## 2024-09-01 NOTE — DISCHARGE NOTE PROVIDER - CARE PROVIDERS DIRECT ADDRESSES
,DirectAddress_Unknown,[email or web address],DirectAddress_Unknown,DirectAddress_Unknown

## 2024-09-01 NOTE — DISCHARGE NOTE PROVIDER - HOSPITAL COURSE
79 /o F with pmh of TIA (1.5 months ago), DM type 2, HTN, HLD p/w fall.      Hospital course:     UTI :   leukocytosis : improving   c/o dysuria   urine is growing Ecoli : ESBL resistant to levaquin   d/c levaquin , ID consulted : f/u recommendation   -> ID was consulted and patient was changed to a 3 day course of Ertapenem to complete antibiotic course - outpatient follow up.      Left hip pain 2/2 OA  improved with tylenol   c/w tylenol   -> pain improved, pt discharged to rehab     S/P fall/ found on floor   - s/p CT head : neg for any ac changes   - fall precaution  - neg orthostatic vitals  - PT consulted: recommendation Rehab     hyponatremia , hx of polydipsia   - decreasing , s/p Tolvaptan   - restarted on bicarb for acidosis   -> pt sodium improved with free water restriction. Nephrology was called. Patient will follow up in office with Dr Taylor upon DC.     Type 2 diabetes mellitus.   - hold metformin  - FSBS are high   - Lispro sliding scale.  - add lantus 6 units q HS   -> outpatient follow up with pcp     Essential hypertension.   - on lisinopril 10 mg , increase to 20 mg : now controlled   - c/w procardia XL and Toprol XL   -> outpatient follow up with pcp     History of transient ischemic attack (TIA).   - cw ASA and statin.  -> neuro fu     On ___ this case was reviewed with  ____, the patient is medically stable and optimized for discharge. All medications were reviewed and prescriptions were sent to mutually agreed upon pharmacy.   79 /o F with pmh of TIA (1.5 months ago), DM type 2, HTN, HLD p/w fall.      Hospital course:     UTI :   leukocytosis : improving   c/o dysuria   urine is growing Ecoli : ESBL resistant to levaquin   d/c levaquin , ID consulted : f/u recommendation   -> ID was consulted and patient was changed to a 3 day course of Ertapenem to complete antibiotic course - outpatient follow up.      Left hip pain 2/2 OA  improved with tylenol   c/w tylenol   -> pain improved, pt discharged to rehab     S/P fall/ found on floor   - s/p CT head : neg for any ac changes   - fall precaution  - neg orthostatic vitals  - PT consulted: recommendation Rehab     hyponatremia , hx of polydipsia   - decreasing , s/p Tolvaptan   - restarted on bicarb for acidosis   -> pt sodium improved with free water restriction. Nephrology was called. Patient will follow up in office with Dr Taylor upon DC.     Type 2 diabetes mellitus.   - hold metformin  - FSBS are high   - Lispro sliding scale.  - add lantus 6 units q HS   -> outpatient follow up with pcp     Essential hypertension.   - on lisinopril 10 mg , increase to 20 mg : now controlled   - c/w procardia XL and Toprol XL (changed to Labetalol 200 BID)  -> outpatient follow up with pcp     History of transient ischemic attack (TIA).   - cw ASA and statin.  -> neuro fu     On 9/2 this case was reviewed with Dr. Murphy, the patient is medically stable and optimized for discharge to Rehab. All medications were reviewed w/ Attending prior to dc.

## 2024-09-01 NOTE — DISCHARGE NOTE PROVIDER - CARE PROVIDER_API CALL
Evonne Tenorio  Neurology  [address] [facility] [address], [address]  [place] [address]  Phone: [phone]  Fax: [phone]  Follow Up Time:     Bob Manzanares  Nephrology  [address], [address]  [place] [address]  Phone: [phone]  Fax: [phone]  Established Patient  Follow Up Time:     EDDA PIKE  [address] [place] [address]  Phone: [phone]  Fax: [phone]  Follow Up Time: 1 week    Moe Sidhu  Cardiovascular Disease  [address] [facility] [facility] [address]  [place] [address]  Phone: [phone]  Fax: [phone]  Follow Up Time:

## 2024-09-01 NOTE — DISCHARGE NOTE PROVIDER - NSDCMRMEDTOKEN_GEN_ALL_CORE_FT
Aspirin EC 81 mg oral delayed release tablet: 1 tab(s) orally once a day  cilostazol 50 mg oral tablet: 1 tab(s) orally 2 times a day  gabapentin 100 mg oral capsule: 1 cap(s) orally once a day (at bedtime)  losartan 25 mg oral tablet: 1 tab(s) orally once a day  meclizine 25 mg oral tablet: 1 tab(s) orally 2 times a day as needed for  dizziness ...(last fill 6/5/2024 x 30 tablets)  metFORMIN 1000 mg oral tablet: 1 tab(s) orally 2 times a day  metoprolol succinate 100 mg oral tablet, extended release: 1 tab(s) orally once a day  multivitamin: 1 tab(s) orally once a day  oxyBUTYnin 10 mg/24 hr oral tablet, extended release: 1 tab(s) orally once a day  pioglitazone 15 mg oral tablet: 1 tab(s) orally once a day  simvastatin 40 mg oral tablet: 1 tab(s) orally once a day (at bedtime)  Tradjenta 5 mg oral tablet: 1 tab(s) orally once a day   acetaminophen 325 mg oral tablet: 2 tab(s) orally every 6 hours As needed Temp greater or equal to 38C (100.4F), Mild Pain (1 - 3), Moderate Pain (4 - 6)  Aspirin EC 81 mg oral delayed release tablet: 1 tab(s) orally once a day  cilostazol 50 mg oral tablet: 1 tab(s) orally 2 times a day  gabapentin 100 mg oral capsule: 1 cap(s) orally once a day (at bedtime)  labetalol 200 mg oral tablet: 1 tab(s) orally 2 times a day  lisinopril 20 mg oral tablet: 1 tab(s) orally once a day  metFORMIN 1000 mg oral tablet: 1 tab(s) orally 2 times a day  multivitamin: 1 tab(s) orally once a day  NIFEdipine 90 mg oral tablet, extended release: 1 tab(s) orally once a day  oxyBUTYnin 10 mg/24 hr oral tablet, extended release: 1 tab(s) orally once a day  pioglitazone 15 mg oral tablet: 1 tab(s) orally once a day  polyethylene glycol 3350 oral powder for reconstitution: 17 gram(s) orally once a day - hold for diarrhea/ loose bowel movements  simvastatin 40 mg oral tablet: 1 tab(s) orally once a day (at bedtime)  sodium bicarbonate 650 mg oral tablet: 1 tab(s) orally 2 times a day  Tradjenta 5 mg oral tablet: 1 tab(s) orally once a day

## 2024-09-01 NOTE — PROGRESS NOTE ADULT - ASSESSMENT
79-year-old female with PMH TIA 1 and half months ago, DM2, HTN, HLD presents for a fall. nephrology consulted for hyponatremia    Hyponatremia  She follows up with Dr. Manzanares in office  Seen in 2022, work up at that time was polydipsia  Her daughter also confirmed patient still drinks alot of water  Repeat urine work up likely SIADH, with normal TSH and cortisol level  - free water restriction <1L/day, per patient is compliant    - optimize dm control  - monitor BMP daily now.    Hypertension:  BP is now controlled.  - continue current meds  - monitor BP.    acidosis  non ag  restart biarb  better    weakness  work up per team    leukocytosis  better  likely UTI  started on iv levo  work up per team

## 2024-09-01 NOTE — DISCHARGE NOTE PROVIDER - NSDCCPCAREPLAN_GEN_ALL_CORE_FT
PRINCIPAL DISCHARGE DIAGNOSIS  Diagnosis: Acute UTI  Assessment and Plan of Treatment: You completed antibiotics for a urinary infection. To help prevent future infections maintain healthy hygiene and avoid taking long baths. Wipe from front to back and empty your bladder frequently by keeping yourself properly hydrated.   Monitor for signs/symptoms of infection, such as, fever/chills, burning/pain with urination, urinary frequency/hesitancy, cloudy urine, or blood in urine and follow-up with your primary care provider as outpatient for further care.        SECONDARY DISCHARGE DIAGNOSES  Diagnosis: Fall  Assessment and Plan of Treatment: You were admitted to the hospital after a fall. Please maintain a safe environment where you reside. Place night lights in the hallways and non-slip rugs/mats on hard surfaces. It is recommended that you move in a careful manner to prevent future events. You are now being discharged to rehab to focus on physical therapy. follow-up with your primary care provider.      Diagnosis: History of transient ischemic attack (TIA)  Assessment and Plan of Treatment: Please continue current medication regimen and follow up with Neurology.    Diagnosis: Essential hypertension  Assessment and Plan of Treatment: Continue blood pressure medication regimen as directed. Monitor for any visual changes, headaches or dizziness.  Monitor blood pressure regularly.  Follow up with your primary care provider for further management for high blood pressure.      Diagnosis: Hyponatremia  Assessment and Plan of Treatment: You sodium was low and you were treated with medication and water restriction. Please follow up with Nephrology Dr Taylor upon discharge. You will need to have your BMP repeated as outpatient.

## 2024-09-01 NOTE — CHART NOTE - NSCHARTNOTEFT_GEN_A_CORE
Writer provided family update to patient daughter Mellisa at bedside. All questions answered at this time. Mellisa request full liquid diet for today for her mother - diet updated for today.     Rad Amaya PA-C,   Internal Medicine ACP   In house pager #[phone]

## 2024-09-01 NOTE — DISCHARGE NOTE PROVIDER - PROVIDER TOKENS
PROVIDER:[TOKEN:[[id number]]],PROVIDER:[TOKEN:[[id number]],ESTABLISHEDPATIENT:[T]],PROVIDER:[TOKEN:[[id number]],FOLLOWUP:[1 week]],PROVIDER:[TOKEN:[[id number]]]

## 2024-09-02 ENCOUNTER — TRANSCRIPTION ENCOUNTER (OUTPATIENT)
Age: 79
End: 2024-09-02

## 2024-09-02 VITALS
TEMPERATURE: 98 F | SYSTOLIC BLOOD PRESSURE: 146 MMHG | HEART RATE: 80 BPM | OXYGEN SATURATION: 99 % | DIASTOLIC BLOOD PRESSURE: 68 MMHG | RESPIRATION RATE: 18 BRPM

## 2024-09-02 LAB
ANION GAP SERPL CALC-SCNC: 11 MMOL/L — SIGNIFICANT CHANGE UP (ref 7–14)
BASOPHILS # BLD AUTO: 0.06 K/UL — SIGNIFICANT CHANGE UP (ref 0–0.2)
BASOPHILS NFR BLD AUTO: 0.4 % — SIGNIFICANT CHANGE UP (ref 0–2)
BUN SERPL-MCNC: 15 MG/DL — SIGNIFICANT CHANGE UP (ref 7–23)
CALCIUM SERPL-MCNC: 9.3 MG/DL — SIGNIFICANT CHANGE UP (ref 8.4–10.5)
CHLORIDE SERPL-SCNC: 92 MMOL/L — LOW (ref 98–107)
CO2 SERPL-SCNC: 26 MMOL/L — SIGNIFICANT CHANGE UP (ref 22–31)
CREAT SERPL-MCNC: 0.8 MG/DL — SIGNIFICANT CHANGE UP (ref 0.5–1.3)
CULTURE RESULTS: SIGNIFICANT CHANGE UP
CULTURE RESULTS: SIGNIFICANT CHANGE UP
EGFR: 75 ML/MIN/1.73M2 — SIGNIFICANT CHANGE UP
EOSINOPHIL # BLD AUTO: 0.33 K/UL — SIGNIFICANT CHANGE UP (ref 0–0.5)
EOSINOPHIL NFR BLD AUTO: 2.3 % — SIGNIFICANT CHANGE UP (ref 0–6)
GLUCOSE BLDC GLUCOMTR-MCNC: 185 MG/DL — HIGH (ref 70–99)
GLUCOSE BLDC GLUCOMTR-MCNC: 216 MG/DL — HIGH (ref 70–99)
GLUCOSE SERPL-MCNC: 203 MG/DL — HIGH (ref 70–99)
HCT VFR BLD CALC: 31.9 % — LOW (ref 34.5–45)
HGB BLD-MCNC: 11 G/DL — LOW (ref 11.5–15.5)
IANC: 10.04 K/UL — HIGH (ref 1.8–7.4)
IMM GRANULOCYTES NFR BLD AUTO: 0.6 % — SIGNIFICANT CHANGE UP (ref 0–0.9)
LYMPHOCYTES # BLD AUTO: 16.1 % — SIGNIFICANT CHANGE UP (ref 13–44)
LYMPHOCYTES # BLD AUTO: 2.28 K/UL — SIGNIFICANT CHANGE UP (ref 1–3.3)
MAGNESIUM SERPL-MCNC: 1.7 MG/DL — SIGNIFICANT CHANGE UP (ref 1.6–2.6)
MCHC RBC-ENTMCNC: 28.6 PG — SIGNIFICANT CHANGE UP (ref 27–34)
MCHC RBC-ENTMCNC: 34.5 GM/DL — SIGNIFICANT CHANGE UP (ref 32–36)
MCV RBC AUTO: 83.1 FL — SIGNIFICANT CHANGE UP (ref 80–100)
MONOCYTES # BLD AUTO: 1.35 K/UL — HIGH (ref 0–0.9)
MONOCYTES NFR BLD AUTO: 9.5 % — SIGNIFICANT CHANGE UP (ref 2–14)
NEUTROPHILS # BLD AUTO: 10.04 K/UL — HIGH (ref 1.8–7.4)
NEUTROPHILS NFR BLD AUTO: 71.1 % — SIGNIFICANT CHANGE UP (ref 43–77)
NRBC # BLD: 0 /100 WBCS — SIGNIFICANT CHANGE UP (ref 0–0)
NRBC # FLD: 0 K/UL — SIGNIFICANT CHANGE UP (ref 0–0)
PHOSPHATE SERPL-MCNC: 3.3 MG/DL — SIGNIFICANT CHANGE UP (ref 2.5–4.5)
PLATELET # BLD AUTO: 396 K/UL — SIGNIFICANT CHANGE UP (ref 150–400)
POTASSIUM SERPL-MCNC: 4.4 MMOL/L — SIGNIFICANT CHANGE UP (ref 3.5–5.3)
POTASSIUM SERPL-SCNC: 4.4 MMOL/L — SIGNIFICANT CHANGE UP (ref 3.5–5.3)
RBC # BLD: 3.84 M/UL — SIGNIFICANT CHANGE UP (ref 3.8–5.2)
RBC # FLD: 12.1 % — SIGNIFICANT CHANGE UP (ref 10.3–14.5)
SODIUM SERPL-SCNC: 129 MMOL/L — LOW (ref 135–145)
SPECIMEN SOURCE: SIGNIFICANT CHANGE UP
SPECIMEN SOURCE: SIGNIFICANT CHANGE UP
WBC # BLD: 14.15 K/UL — HIGH (ref 3.8–10.5)
WBC # FLD AUTO: 14.15 K/UL — HIGH (ref 3.8–10.5)

## 2024-09-02 RX ORDER — SODIUM BICARBONATE 84 MG/ML
1 INJECTION, SOLUTION INTRAVENOUS
Qty: 0 | Refills: 0 | DISCHARGE
Start: 2024-09-02

## 2024-09-02 RX ORDER — NIFEDIPINE 60 MG/1
1 TABLET, FILM COATED, EXTENDED RELEASE ORAL
Qty: 0 | Refills: 0 | DISCHARGE
Start: 2024-09-02

## 2024-09-02 RX ORDER — LOSARTAN POTASSIUM 50 MG/1
1 TABLET ORAL
Refills: 0 | DISCHARGE

## 2024-09-02 RX ORDER — ACETAMINOPHEN 325 MG/1
2 TABLET ORAL
Qty: 0 | Refills: 0 | DISCHARGE
Start: 2024-09-02

## 2024-09-02 RX ORDER — POLYETHYLENE GLYCOL 3350 17 G/17G
17 POWDER, FOR SOLUTION ORAL
Qty: 0 | Refills: 0 | DISCHARGE
Start: 2024-09-02

## 2024-09-02 RX ORDER — METOPROLOL TARTRATE 100 MG/1
1 TABLET ORAL
Refills: 0 | DISCHARGE

## 2024-09-02 RX ORDER — LISINOPRIL 10 MG/1
1 TABLET ORAL
Qty: 0 | Refills: 0 | DISCHARGE
Start: 2024-09-02

## 2024-09-02 RX ADMIN — Medication 100 MILLIGRAM(S): at 13:16

## 2024-09-02 RX ADMIN — ACETAMINOPHEN 650 MILLIGRAM(S): 325 TABLET ORAL at 01:09

## 2024-09-02 RX ADMIN — Medication 200 MILLIGRAM(S): at 06:30

## 2024-09-02 RX ADMIN — ACETAMINOPHEN 650 MILLIGRAM(S): 325 TABLET ORAL at 00:39

## 2024-09-02 RX ADMIN — POLYETHYLENE GLYCOL 3350 17 GRAM(S): 17 POWDER, FOR SOLUTION ORAL at 06:31

## 2024-09-02 RX ADMIN — LISINOPRIL 20 MILLIGRAM(S): 10 TABLET ORAL at 06:30

## 2024-09-02 RX ADMIN — Medication 1 PATCH: at 07:20

## 2024-09-02 RX ADMIN — Medication 80 MILLIGRAM(S): at 02:40

## 2024-09-02 RX ADMIN — ERTAPENEM SODIUM 120 MILLIGRAM(S): 1 INJECTION, POWDER, LYOPHILIZED, FOR SOLUTION INTRAMUSCULAR; INTRAVENOUS at 15:54

## 2024-09-02 RX ADMIN — Medication 3 UNIT(S): at 09:25

## 2024-09-02 RX ADMIN — Medication 1 PATCH: at 09:50

## 2024-09-02 RX ADMIN — SODIUM BICARBONATE 650 MILLIGRAM(S): 84 INJECTION, SOLUTION INTRAVENOUS at 06:30

## 2024-09-02 RX ADMIN — Medication 1: at 13:15

## 2024-09-02 RX ADMIN — NIFEDIPINE 90 MILLIGRAM(S): 60 TABLET, FILM COATED, EXTENDED RELEASE ORAL at 06:30

## 2024-09-02 RX ADMIN — Medication 81 MILLIGRAM(S): at 13:17

## 2024-09-02 RX ADMIN — Medication 3 UNIT(S): at 13:15

## 2024-09-02 RX ADMIN — Medication 2: at 09:24

## 2024-09-02 RX ADMIN — CHLORHEXIDINE GLUCONATE 1 APPLICATION(S): 40 SOLUTION TOPICAL at 13:23

## 2024-09-02 NOTE — PROGRESS NOTE ADULT - PROVIDER SPECIALTY LIST ADULT
Cardiology
Hospitalist
Internal Medicine
Internal Medicine
Nephrology
Neurology
Cardiology
Hospitalist
Internal Medicine
Internal Medicine
Nephrology
Neurology
Cardiology
Cardiology
Hospitalist
Nephrology
Neurology
Cardiology
Cardiology
Hospitalist
Internal Medicine
Internal Medicine
Nephrology
Neurology

## 2024-09-02 NOTE — PROGRESS NOTE ADULT - REASON FOR ADMISSION
Fall

## 2024-09-02 NOTE — DISCHARGE NOTE NURSING/CASE MANAGEMENT/SOCIAL WORK - PATIENT PORTAL LINK FT
You can access the FollowMyHealth Patient Portal offered by [facility] by registering at the following website: [email or web address]. By joining [employer]’s FollowMyHealth portal, you will also be able to view your health information using other applications (apps) compatible with our system.

## 2024-09-02 NOTE — DISCHARGE NOTE NURSING/CASE MANAGEMENT/SOCIAL WORK - FLU SEASON?
PT ARRIVED @ 0840   , PT'S ORIGINAL APPT WAS @  0845 , PT WAS ROOMED @  0845    REVIEWED WITH PT DEPO PROVERA,  HAND OUT GIVEN TO PT ABOUT DEPO PROVERA, REVIEWED MEDICATION DOCUMENTATION TO CONFIRM CORRECT MEDICATION, INJECTION GIVEN VIA  R GLLUTEAL   W/O INCIDENT  
No

## 2024-09-02 NOTE — PROGRESS NOTE ADULT - SUBJECTIVE AND OBJECTIVE BOX
Subjective: Patient seen and examined. No new events except as noted.     SUBJECTIVE/ROS:  nad      MEDICATIONS:  MEDICATIONS  (STANDING):  amLODIPine   Tablet 5 milliGRAM(s) Oral daily  aspirin  chewable 81 milliGRAM(s) Oral daily  atorvastatin 40 milliGRAM(s) Oral at bedtime  chlorhexidine 2% Cloths 1 Application(s) Topical daily  dextrose 5%. 1000 milliLiter(s) (50 mL/Hr) IV Continuous <Continuous>  dextrose 5%. 1000 milliLiter(s) (100 mL/Hr) IV Continuous <Continuous>  dextrose 50% Injectable 25 Gram(s) IV Push once  dextrose 50% Injectable 12.5 Gram(s) IV Push once  dextrose 50% Injectable 25 Gram(s) IV Push once  gabapentin 100 milliGRAM(s) Oral daily  glucagon  Injectable 1 milliGRAM(s) IntraMuscular once  insulin lispro (ADMELOG) corrective regimen sliding scale   SubCutaneous three times a day before meals  insulin lispro (ADMELOG) corrective regimen sliding scale   SubCutaneous at bedtime  metoprolol succinate  milliGRAM(s) Oral daily  sodium bicarbonate 650 milliGRAM(s) Oral three times a day      PHYSICAL EXAM:  T(C): 36.7 (08-21-24 @ 17:25), Max: 36.7 (08-21-24 @ 17:25)  HR: 71 (08-21-24 @ 17:25) (70 - 71)  BP: 160/68 (08-21-24 @ 17:25) (160/68 - 161/64)  RR: 18 (08-21-24 @ 17:25) (17 - 18)  SpO2: 99% (08-21-24 @ 17:25) (99% - 100%)  Wt(kg): --  I&O's Summary    21 Aug 2024 07:01  -  22 Aug 2024 07:00  --------------------------------------------------------  IN: 0 mL / OUT: 500 mL / NET: -500 mL            JVP: Normal  Neck: supple  Lung: clear   CV: S1 S2 , Murmur:  Abd: soft  Ext: No edema  neuro: Awake / alert  Psych: flat affect  Skin: normal``    LABS/DATA:    CARDIAC MARKERS:                                11.7   10.82 )-----------( 373      ( 22 Aug 2024 05:10 )             34.7     08-22    131<L>  |  94<L>  |  20  ----------------------------<  177<H>  4.2   |  22  |  0.88    Ca    9.6      22 Aug 2024 05:10  Phos  3.9     08-22  Mg     1.50     08-22      proBNP:   Lipid Profile:   HgA1c:   TSH:     TELE:  EKG:      < from: TTE W or WO Ultrasound Enhancing Agent (08.21.24 @ 12:06) >     CONCLUSIONS:      1. Left ventricular systolic function is hyperdynamic with an ejection fraction visually estimated at >75 %.   2. Normal right ventricular cavity size, with normal wall thickness, and normal right ventricular systolic function. Tricuspid annular plane systolic excursion (TAPSE) is 2.1 cm (normal >=1.7 cm).   3. Normal left and right atrial size.   4. No significant valvular disease.   5. No pericardial effusion seen.   6. Hyperdynamic left ventricular systolic function with intra-cavitary gradient of 39 mmHg.   7. The inferior vena cava is normal in size (normal <2.1cm) with normal inspiratory collapse (normal >50%) consistent with normal right atrial pressure (~3, range 0-5mmHg).    < end of copied text >    
    Subjective: Patient seen and examined. No new events except as noted.     SUBJECTIVE/ROS:  nad      MEDICATIONS:  MEDICATIONS  (STANDING):  aspirin  chewable 81 milliGRAM(s) Oral daily  atorvastatin 40 milliGRAM(s) Oral at bedtime  chlorhexidine 2% Cloths 1 Application(s) Topical daily  dextrose 5%. 1000 milliLiter(s) (50 mL/Hr) IV Continuous <Continuous>  dextrose 5%. 1000 milliLiter(s) (100 mL/Hr) IV Continuous <Continuous>  dextrose 50% Injectable 25 Gram(s) IV Push once  dextrose 50% Injectable 12.5 Gram(s) IV Push once  dextrose 50% Injectable 25 Gram(s) IV Push once  gabapentin 100 milliGRAM(s) Oral daily  glucagon  Injectable 1 milliGRAM(s) IntraMuscular once  insulin glargine Injectable (LANTUS) 12 Unit(s) SubCutaneous at bedtime  insulin lispro (ADMELOG) corrective regimen sliding scale   SubCutaneous three times a day before meals  insulin lispro (ADMELOG) corrective regimen sliding scale   SubCutaneous at bedtime  insulin lispro Injectable (ADMELOG) 3 Unit(s) SubCutaneous three times a day before meals  labetalol 200 milliGRAM(s) Oral two times a day  levoFLOXacin IVPB      levoFLOXacin IVPB 750 milliGRAM(s) IV Intermittent every 24 hours  lidocaine   4% Patch 1 Patch Transdermal every 24 hours  lisinopril 20 milliGRAM(s) Oral daily  NIFEdipine XL 90 milliGRAM(s) Oral daily  polyethylene glycol 3350 17 Gram(s) Oral daily  sodium bicarbonate 650 milliGRAM(s) Oral two times a day  sodium chloride 1.5%. 500 milliLiter(s) (50 mL/Hr) IV Continuous <Continuous>  sodium chloride 2% . 1000 milliLiter(s) (50 mL/Hr) IV Continuous <Continuous>      PHYSICAL EXAM:  T(C): 36.5 (08-31-24 @ 05:00), Max: 37 (08-30-24 @ 17:07)  HR: 67 (08-31-24 @ 05:00) (67 - 78)  BP: 149/56 (08-31-24 @ 05:00) (120/48 - 154/65)  RR: 18 (08-31-24 @ 05:00) (18 - 18)  SpO2: 99% (08-31-24 @ 05:00) (99% - 100%)  Wt(kg): --  I&O's Summary    30 Aug 2024 07:01  -  31 Aug 2024 07:00  --------------------------------------------------------  IN: 720 mL / OUT: 1800 mL / NET: -1080 mL            JVP: Normal  Neck: supple  Lung: clear   CV: S1 S2 , Murmur:  Abd: soft  Ext: No edema  neuro: Awake / alert  Psych: flat affect  Skin: normal``    LABS/DATA:    CARDIAC MARKERS:                                10.4   12.06 )-----------( 331      ( 30 Aug 2024 06:10 )             31.9     08-30    133<L>  |  101  |  25<H>  ----------------------------<  182<H>  4.6   |  22  |  1.08    Ca    9.0      30 Aug 2024 06:10  Phos  3.7     08-30  Mg     1.60     08-30      proBNP:   Lipid Profile:   HgA1c:   TSH:     TELE:  EKG:        
BLANCA FREIRE  79y  Patient is a 79y old  Female who presents with a chief complaint of Fall (31 Aug 2024 14:06)    HPI:  Admitted after a fall, followed for Hyponatremia.  .  HEALTH ISSUES - PROBLEM Dx:  Left hip pain    Fall    Generalized weakness    Type 2 diabetes mellitus    Essential hypertension    History of transient ischemic attack (TIA)    Medication management          MEDICATIONS  (STANDING):  aspirin  chewable 81 milliGRAM(s) Oral daily  atorvastatin 40 milliGRAM(s) Oral at bedtime  chlorhexidine 2% Cloths 1 Application(s) Topical daily  dextrose 5%. 1000 milliLiter(s) (50 mL/Hr) IV Continuous <Continuous>  dextrose 5%. 1000 milliLiter(s) (100 mL/Hr) IV Continuous <Continuous>  dextrose 50% Injectable 25 Gram(s) IV Push once  dextrose 50% Injectable 25 Gram(s) IV Push once  dextrose 50% Injectable 12.5 Gram(s) IV Push once  ertapenem  IVPB 1000 milliGRAM(s) IV Intermittent every 24 hours  gabapentin 100 milliGRAM(s) Oral daily  glucagon  Injectable 1 milliGRAM(s) IntraMuscular once  insulin glargine Injectable (LANTUS) 12 Unit(s) SubCutaneous at bedtime  insulin lispro (ADMELOG) corrective regimen sliding scale   SubCutaneous three times a day before meals  insulin lispro (ADMELOG) corrective regimen sliding scale   SubCutaneous at bedtime  insulin lispro Injectable (ADMELOG) 3 Unit(s) SubCutaneous three times a day before meals  labetalol 200 milliGRAM(s) Oral two times a day  lidocaine   4% Patch 1 Patch Transdermal every 24 hours  lisinopril 20 milliGRAM(s) Oral daily  NIFEdipine XL 90 milliGRAM(s) Oral daily  polyethylene glycol 3350 17 Gram(s) Oral daily  sodium bicarbonate 650 milliGRAM(s) Oral two times a day    MEDICATIONS  (PRN):  acetaminophen     Tablet .. 650 milliGRAM(s) Oral every 6 hours PRN Temp greater or equal to 38C (100.4F), Mild Pain (1 - 3), Moderate Pain (4 - 6)  aluminum hydroxide/magnesium hydroxide/simethicone Suspension 30 milliLiter(s) Oral every 4 hours PRN Dyspepsia  dextrose Oral Gel 15 Gram(s) Oral once PRN Blood Glucose LESS THAN 70 milliGRAM(s)/deciliter  oxyCODONE    IR 2.5 milliGRAM(s) Oral every 8 hours PRN Severe Pain (7 - 10)  petrolatum white Ointment 1 Application(s) Topical every 8 hours PRN chapped lips    Vital Signs Last 24 Hrs  T(C): 37.3 (31 Aug 2024 21:10), Max: 37.3 (31 Aug 2024 21:10)  T(F): 99.1 (31 Aug 2024 21:10), Max: 99.1 (31 Aug 2024 21:10)  HR: 72 (31 Aug 2024 21:10) (65 - 92)  BP: 123/44 (31 Aug 2024 21:10) (123/44 - 149/56)  BP(mean): --  RR: 18 (31 Aug 2024 21:10) (18 - 18)  SpO2: 100% (31 Aug 2024 21:10) (99% - 100%)    Parameters below as of 31 Aug 2024 21:10  Patient On (Oxygen Delivery Method): room air      Daily     Daily     PHYSICAL EXAM:  Constitutional: She  appears comfortable and not distressed. Not diaphoretic.    Neck:  The thyroid is normal. Trachea is midline.     Breasts: Normal examination.    Respiratory: The lungs are clear to auscultation. No dullness and expansion is normal.    Cardiovascular: S1 and S2 are normal. No mummurs, rubs or gallops are present.    Gastrointestinal: The abdomen is soft. No tenderness is present. No masses are present. Bowel sounds are normal.    Genitourinary: The bladder is not distended. No CVA tenderness is present.    Extremities: No edema is noted. No deformities are present.    Neurological: Cognition is normal. Tone, power and sensation are normal. Gait is steady.    Skin: No leasions are seen  or palpated.    Lymph Nodes: No lymphadenopathy is present.    Psychiatric: Mood is appropriate. No hallucinations or flight of ideas are noted.                              10.0   11.30 )-----------( 334      ( 31 Aug 2024 05:05 )             30.9     08-31    131<L>  |  97<L>  |  21  ----------------------------<  157<H>  4.5   |  23  |  0.99    Ca    9.1      31 Aug 2024 05:05  Phos  3.7     08-31  Mg     1.80     08-31    Sodium: 131 mmol/L (08.31.24 @ 05:05)   Sodium: 133 mmol/L (08.30.24 @ 06:10)   Sodium: 128 mmol/L (08.29.24 @ 17:00)   Sodium: 127 mmol/L (08.29.24 @ 06:30)   Sodium: 125 mmol/L (08.28.24 @ 19:35)   Sodium: 124 mmol/L (08.28.24 @ 05:16)   Osmolality, Random Urine: 496 mosm/kg (08.21.24 @ 11:20) 
[facility] [place] [facility]   MD MAURIZIO CLARK MD ANGELA WONG, PA    TEL:  OFFICE: [phone]  From 5pm-7am Answering Service [phone]    -- RENAL FOLLOW UP NOTE ---Date of Service 08-30-24 @ 10:07    Patient is a 79y old  Female who presents with a chief complaint of Fall (30 Aug 2024 08:47)      Patient seen and examined at bedside. No chest pain/sob    VITALS:  T(F): 98 (08-30-24 @ 09:56), Max: 98.4 (08-29-24 @ 16:21)  HR: 76 (08-30-24 @ 09:56)  BP: 120/48 (08-30-24 @ 09:56)  RR: 18 (08-30-24 @ 09:56)  SpO2: 100% (08-30-24 @ 09:56)  Wt(kg): --    08-29 @ 07:01  -  08-30 @ 07:00  --------------------------------------------------------  IN: 450 mL / OUT: 1900 mL / NET: -1450 mL          PHYSICAL EXAM:  General: NAD  Neck: No JVD  Respiratory: CTAB, no wheezes, rales or rhonchi  Cardiovascular: S1, S2, RRR  Gastrointestinal: BS+, soft, NT/ND  Extremities: No peripheral edema    Hospital Medications:   MEDICATIONS  (STANDING):  aspirin  chewable 81 milliGRAM(s) Oral daily  atorvastatin 40 milliGRAM(s) Oral at bedtime  chlorhexidine 2% Cloths 1 Application(s) Topical daily  dextrose 5%. 1000 milliLiter(s) (50 mL/Hr) IV Continuous <Continuous>  dextrose 5%. 1000 milliLiter(s) (100 mL/Hr) IV Continuous <Continuous>  dextrose 50% Injectable 25 Gram(s) IV Push once  dextrose 50% Injectable 25 Gram(s) IV Push once  dextrose 50% Injectable 12.5 Gram(s) IV Push once  gabapentin 100 milliGRAM(s) Oral daily  glucagon  Injectable 1 milliGRAM(s) IntraMuscular once  insulin glargine Injectable (LANTUS) 12 Unit(s) SubCutaneous at bedtime  insulin lispro (ADMELOG) corrective regimen sliding scale   SubCutaneous three times a day before meals  insulin lispro (ADMELOG) corrective regimen sliding scale   SubCutaneous at bedtime  insulin lispro Injectable (ADMELOG) 3 Unit(s) SubCutaneous three times a day before meals  labetalol 200 milliGRAM(s) Oral two times a day  levoFLOXacin IVPB      levoFLOXacin IVPB 750 milliGRAM(s) IV Intermittent every 24 hours  lidocaine   4% Patch 1 Patch Transdermal every 24 hours  lisinopril 20 milliGRAM(s) Oral daily  NIFEdipine XL 90 milliGRAM(s) Oral daily  polyethylene glycol 3350 17 Gram(s) Oral daily  sodium bicarbonate 650 milliGRAM(s) Oral two times a day  sodium chloride 1.5%. 500 milliLiter(s) (50 mL/Hr) IV Continuous <Continuous>  sodium chloride 2% . 1000 milliLiter(s) (50 mL/Hr) IV Continuous <Continuous>      LABS:  08-30    133<L>  |  101  |  25<H>  ----------------------------<  182<H>  4.6   |  22  |  1.08    Ca    9.0      30 Aug 2024 06:10  Phos  3.7     08-30  Mg     1.60     08-30      Creatinine Trend: 1.08 <--, 1.20 <--, 1.15 <--, 1.18 <--, 1.03 <--, 0.95 <--, 1.02 <--, 1.01 <--, 1.01 <--, 0.67 <--    Phosphorus: 3.7 mg/dL (08-30 @ 06:10)  Phosphorus: 3.5 mg/dL (08-29 @ 17:00)                              10.4   12.06 )-----------( 331      ( 30 Aug 2024 06:10 )             31.9     Urine Studies:  Urinalysis - [08-30-24 @ 06:10]      Color  / Appearance  / SG  / pH       Gluc 182 / Ketone   / Bili  / Urobili        Blood  / Protein  / Leuk Est  / Nitrite       RBC  / WBC  / Hyaline  / Gran  / Sq Epi  / Non Sq Epi  / Bacteria       TSH 1.12      [08-21-24 @ 06:01]  Lipid: chol 139, TG 72, HDL 46, LDL --      [08-22-24 @ 05:10]        RADIOLOGY & ADDITIONAL STUDIES:  
[facility] [place] [facility]   MD MAURIZIO CLARK MD ANGELA WONG, PA    TEL:  OFFICE: [phone]  From 5pm-7am Answering Service [phone]    -- RENAL FOLLOW UP NOTE ---Date of Service 08-20-24 @ 16:06    Patient is a 79y old  Female who presents with a chief complaint of Fall (20 Aug 2024 06:26)      Patient seen and examined at bedside. No chest pain/sob    VITALS:  T(F): 97.6 (08-20-24 @ 09:40), Max: 97.8 (08-19-24 @ 20:42)  HR: 73 (08-20-24 @ 09:40)  BP: 148/63 (08-20-24 @ 09:40)  RR: 19 (08-20-24 @ 09:40)  SpO2: 100% (08-20-24 @ 09:40)  Wt(kg): --        PHYSICAL EXAM:  General: NAD  Neck: No JVD  Respiratory: CTAB, no wheezes, rales or rhonchi  Cardiovascular: S1, S2, RRR  Gastrointestinal: BS+, soft, NT/ND  Extremities: No peripheral edema    Hospital Medications:   MEDICATIONS  (STANDING):  amLODIPine   Tablet 5 milliGRAM(s) Oral daily  dextrose 5%. 1000 milliLiter(s) (50 mL/Hr) IV Continuous <Continuous>  dextrose 5%. 1000 milliLiter(s) (100 mL/Hr) IV Continuous <Continuous>  dextrose 50% Injectable 25 Gram(s) IV Push once  dextrose 50% Injectable 25 Gram(s) IV Push once  dextrose 50% Injectable 12.5 Gram(s) IV Push once  gabapentin 100 milliGRAM(s) Oral daily  glucagon  Injectable 1 milliGRAM(s) IntraMuscular once  insulin lispro (ADMELOG) corrective regimen sliding scale   SubCutaneous at bedtime  insulin lispro (ADMELOG) corrective regimen sliding scale   SubCutaneous three times a day before meals  metoprolol succinate  milliGRAM(s) Oral daily  sodium chloride 1 Gram(s) Oral two times a day      LABS:  08-20    127<L>  |  92<L>  |  12  ----------------------------<  165<H>  4.2   |  17<L>  |  0.60    Ca    10.1      20 Aug 2024 07:00  Phos  3.5     08-20  Mg     1.40     08-20    TPro  7.5  /  Alb  4.2  /  TBili  0.3  /  DBili      /  AST  11  /  ALT  9   /  AlkPhos  51  08-19    Creatinine Trend: 0.60 <--, 0.90 <--    Phosphorus: 3.5 mg/dL (08-20 @ 07:00)                              13.8   12.08 )-----------( 340      ( 20 Aug 2024 07:00 )             41.4     Urine Studies:  Urinalysis - [08-20-24 @ 07:00]      Color  / Appearance  / SG  / pH       Gluc 165 / Ketone   / Bili  / Urobili        Blood  / Protein  / Leuk Est  / Nitrite       RBC  / WBC  / Hyaline  / Gran  / Sq Epi  / Non Sq Epi  / Bacteria             RADIOLOGY & ADDITIONAL STUDIES:  
Neurology Progress Note    S: Patient seen and examined. c/o dysuria    Medications: MEDICATIONS  (STANDING):  aspirin  chewable 81 milliGRAM(s) Oral daily  atorvastatin 40 milliGRAM(s) Oral at bedtime  chlorhexidine 2% Cloths 1 Application(s) Topical daily  dextrose 5%. 1000 milliLiter(s) (50 mL/Hr) IV Continuous <Continuous>  dextrose 5%. 1000 milliLiter(s) (100 mL/Hr) IV Continuous <Continuous>  dextrose 50% Injectable 25 Gram(s) IV Push once  dextrose 50% Injectable 12.5 Gram(s) IV Push once  dextrose 50% Injectable 25 Gram(s) IV Push once  gabapentin 100 milliGRAM(s) Oral daily  glucagon  Injectable 1 milliGRAM(s) IntraMuscular once  insulin glargine Injectable (LANTUS) 12 Unit(s) SubCutaneous at bedtime  insulin lispro (ADMELOG) corrective regimen sliding scale   SubCutaneous at bedtime  insulin lispro (ADMELOG) corrective regimen sliding scale   SubCutaneous three times a day before meals  insulin lispro Injectable (ADMELOG) 3 Unit(s) SubCutaneous three times a day before meals  labetalol 200 milliGRAM(s) Oral two times a day  levoFLOXacin IVPB      lidocaine   4% Patch 1 Patch Transdermal every 24 hours  lisinopril 20 milliGRAM(s) Oral daily  mupirocin 2% Ointment 1 Application(s) Topical two times a day  NIFEdipine XL 90 milliGRAM(s) Oral daily  polyethylene glycol 3350 17 Gram(s) Oral daily  sodium chloride 1 Gram(s) Oral three times a day    MEDICATIONS  (PRN):  acetaminophen     Tablet .. 650 milliGRAM(s) Oral every 6 hours PRN Temp greater or equal to 38C (100.4F), Mild Pain (1 - 3), Moderate Pain (4 - 6)  aluminum hydroxide/magnesium hydroxide/simethicone Suspension 30 milliLiter(s) Oral every 4 hours PRN Dyspepsia  dextrose Oral Gel 15 Gram(s) Oral once PRN Blood Glucose LESS THAN 70 milliGRAM(s)/deciliter  oxyCODONE    IR 2.5 milliGRAM(s) Oral every 8 hours PRN Severe Pain (7 - 10)  petrolatum white Ointment 1 Application(s) Topical every 8 hours PRN chapped lips       Vitals:  Vital Signs Last 24 Hrs  T(C): 36.9 (27 Aug 2024 21:05), Max: 37.2 (27 Aug 2024 17:15)  T(F): 98.4 (27 Aug 2024 21:05), Max: 98.9 (27 Aug 2024 17:15)  HR: 61 (27 Aug 2024 21:05) (61 - 103)  BP: 97/50 (27 Aug 2024 21:05) (97/50 - 190/79)  BP(mean): 109 (27 Aug 2024 06:19) (109 - 109)  RR: 18 (27 Aug 2024 21:05) (17 - 18)  SpO2: 100% (27 Aug 2024 21:05) (99% - 100%)    Parameters below as of 27 Aug 2024 21:05  Patient On (Oxygen Delivery Method): room air                    General Exam:   General Appearance: Appropriately dressed and in no acute distress       Head: Normocephalic, atraumatic and no dysmorphic features  Ear, Nose, and Throat: Moist mucous membranes  CVS: S1S2+  Resp: No SOB, no wheeze or rhonchi  Abd: soft NTND  Extremities: No edema, no cyanosis  Skin: No bruises, no rashes     Neurological Exam:  Mental Status: Awake, alert and oriented x 3.  Able to follow simple and complex verbal commands. Able to name and repeat. fluent speech. No obvious aphasia or dysarthria noted.   Cranial Nerves: PERRL, EOMI, VFFC, sensation V1-V3 intact,  L NLF,  equal elevation of palate, scm/trap 5/5, tongue is midline on protrusion.  hearing is grossly intact.   Motor: LUE and LLE drift   Sensation: dec on the L    Reflexes: 1+ throughout at biceps, brachioradialis, triceps, patellars and ankles bilaterally and equal. No clonus. R toe and L toe were both downgoing.  Coordination: No dysmetria on FNF  Gait: deferred      I personally reviewed the below data/images/labs:      LABS:                          11.5   17 )-----------( 347      ( 27 Aug 2024 03:05 )             34.2     08-27    128<L>  |  93<L>  |  39<H>  ----------------------------<  250<H>  4.6   |  22  |  0.95    Ca    8.7      27 Aug 2024 03:05  Phos  2.6       Mg     2.00               Urinalysis Basic - ( 27 Aug 2024 16:55 )    Color: Yellow / Appearance: Turbid / S.016 / pH: x  Gluc: x / Ketone: Negative mg/dL  / Bili: Negative / Urobili: 0.2 mg/dL   Blood: x / Protein: 30 mg/dL / Nitrite: Positive   Leuk Esterase: Large / RBC: 7 /HPF / WBC 1038 /HPF   Sq Epi: x / Non Sq Epi: 4 /HPF / Bacteria: Many /HPF          < from: CT Brain Stroke Protocol (24 @ 14:51) >    ACC: [id number] EXAM:  CT BRAIN STROKE PROTOCOL   ORDERED BY: SARA SNIDER     PROCEDURE DATE:  2024          INTERPRETATION:  CLINICAL INFORMATION: Code stroke, 10 min episode of   aphasia that's returned to baseline, last known normal at 1030am.  NIH   0    TECHNIQUE: Noncontrast axial CT images were acquired through the head.   Two-dimensional sagittal and coronal reformats were generated.    COMPARISON STUDY: MR brain 2015, CT brain 2024    FINDINGS:    No acute intracranial hemorrhage, mass effect, vasogenic edema, or   evidence of acute territorial infarct.    Ventricle size is proportionate for age.  Mild prominent gyri and foci   particularly of the left frontotemporal region minimally changed from   2024. There is mild patchy white matter hypoattenuation which is   nonspecific in etiology but likely related to chronic microvascular   changes.    The visualized paranasal sinuses are clear. The mastoid air cells and   middle ear cavities are clear.    The globes are unremarkable.    The soft tissues of the scalp are unremarkable. The calvarium is intact.    IMPRESSION:    No CT evidence of acute intracranial hemorrhage, brain edema, or mass   effect. Consider MRI as clinically warranted.    Findings discussed with Dr. Rodriguez from neurology at 2:43 pm on 2024   by YESSY Veloz MD.    < end of copied text >  < from: MRI Head w/wo Cont (01.27.15 @ 19:11) >    IMPRESSION: Evidence of intraventricular hemorrhage layering in the   occipital horns of the lateral ventricles without hydrocephalus. Some   residual subarachnoid hemorrhage in the prepontine and suprasellar   regions as well as some trace what appears to be subarachnoid hemorrhage   in the high left temporal parietal region on the susceptibility and   gradient echo sequences. No definite visualization of a fistula.    < end of copied text >      CONCLUSIONS:      1. Left ventricular systolic function is hyperdynamic with an ejection fraction visually estimated at >75 %.   2. Normal right ventricular cavity size, with normal wall thickness, and normal right ventricular systolic function. Tricuspid annular plane systolic excursion (TAPSE) is 2.1 cm (normal >=1.7 cm).   3. Normal left and right atrial size.   4. No significant valvular disease.   5. No pericardial effusion seen.   6. Hyperdynamic left ventricular systolic function with intra-cavitary gradient of 39 mmHg.   7. The inferior vena cava is normal in size (normal <2.1cm) with normal inspiratory collapse (normal >50%) consistent with normal right atrial pressure (~3, range 0-5mmHg).      
[facility] [place] [facility]   MD MAURIZIO CLARK MD ANGELA WONG, PA    TEL:  OFFICE: [phone]  From 5pm-7am Answering Service [phone]    -- RENAL FOLLOW UP NOTE ---Date of Service 08-21-24 @ 11:03    Patient is a 79y old  Female who presents with a chief complaint of Fall (21 Aug 2024 07:57)      Patient seen and examined at bedside. No chest pain/sob    VITALS:  T(F): 97.4 (08-21-24 @ 10:42), Max: 98.7 (08-20-24 @ 20:25)  HR: 70 (08-21-24 @ 10:42)  BP: 161/64 (08-21-24 @ 10:42)  RR: 17 (08-21-24 @ 10:42)  SpO2: 100% (08-21-24 @ 10:42)  Wt(kg): --        PHYSICAL EXAM:  General: NAD  Neck: No JVD  Respiratory: CTAB, no wheezes, rales or rhonchi  Cardiovascular: S1, S2, RRR  Gastrointestinal: BS+, soft, NT/ND  Extremities: No peripheral edema    Hospital Medications:   MEDICATIONS  (STANDING):  amLODIPine   Tablet 5 milliGRAM(s) Oral daily  chlorhexidine 2% Cloths 1 Application(s) Topical daily  dextrose 5%. 1000 milliLiter(s) (50 mL/Hr) IV Continuous <Continuous>  dextrose 5%. 1000 milliLiter(s) (100 mL/Hr) IV Continuous <Continuous>  dextrose 50% Injectable 25 Gram(s) IV Push once  dextrose 50% Injectable 25 Gram(s) IV Push once  dextrose 50% Injectable 12.5 Gram(s) IV Push once  gabapentin 100 milliGRAM(s) Oral daily  glucagon  Injectable 1 milliGRAM(s) IntraMuscular once  insulin lispro (ADMELOG) corrective regimen sliding scale   SubCutaneous three times a day before meals  insulin lispro (ADMELOG) corrective regimen sliding scale   SubCutaneous at bedtime  metoprolol succinate  milliGRAM(s) Oral daily  sodium bicarbonate 650 milliGRAM(s) Oral three times a day  sodium chloride 1 Gram(s) Oral two times a day      LABS:  08-21    127<L>  |  91<L>  |  17  ----------------------------<  184<H>  4.3   |  22  |  0.80    Ca    9.7      21 Aug 2024 06:01  Phos  3.4     08-21  Mg     1.30     08-21    TPro  7.5  /  Alb  4.2  /  TBili  0.3  /  DBili      /  AST  11  /  ALT  9   /  AlkPhos  51  08-19    Creatinine Trend: 0.80 <--, 0.60 <--, 0.90 <--    Phosphorus: 3.4 mg/dL (08-21 @ 06:01)                              12.0   11.65 )-----------( 389      ( 21 Aug 2024 06:01 )             35.8     Urine Studies:  Urinalysis - [08-21-24 @ 06:01]      Color  / Appearance  / SG  / pH       Gluc 184 / Ketone   / Bili  / Urobili        Blood  / Protein  / Leuk Est  / Nitrite       RBC  / WBC  / Hyaline  / Gran  / Sq Epi  / Non Sq Epi  / Bacteria       TSH 1.12      [08-21-24 @ 06:01]        RADIOLOGY & ADDITIONAL STUDIES:  
Patient is a 79y old  Female who presents with a chief complaint of Fall (22 Aug 2024 11:46)    Date of servie : 08-22-24 @ 16:31  INTERVAL HPI/OVERNIGHT EVENTS:  T(C): 36.5 (08-22-24 @ 10:47), Max: 36.7 (08-21-24 @ 17:25)  HR: 68 (08-22-24 @ 10:47) (68 - 76)  BP: 156/62 (08-22-24 @ 10:47) (146/65 - 160/68)  RR: 18 (08-22-24 @ 10:47) (16 - 18)  SpO2: 97% (08-22-24 @ 10:47) (97% - 100%)  Wt(kg): --  I&O's Summary    21 Aug 2024 07:01  -  22 Aug 2024 07:00  --------------------------------------------------------  IN: 0 mL / OUT: 500 mL / NET: -500 mL        LABS:                        11.7   10.82 )-----------( 373      ( 22 Aug 2024 05:10 )             34.7     08-22    131<L>  |  94<L>  |  20  ----------------------------<  177<H>  4.2   |  22  |  0.88    Ca    9.6      22 Aug 2024 05:10  Phos  3.9     08-22  Mg     1.50     08-22        Urinalysis Basic - ( 22 Aug 2024 05:10 )    Color: x / Appearance: x / SG: x / pH: x  Gluc: 177 mg/dL / Ketone: x  / Bili: x / Urobili: x   Blood: x / Protein: x / Nitrite: x   Leuk Esterase: x / RBC: x / WBC x   Sq Epi: x / Non Sq Epi: x / Bacteria: x      CAPILLARY BLOOD GLUCOSE      POCT Blood Glucose.: 176 mg/dL (22 Aug 2024 12:15)  POCT Blood Glucose.: 210 mg/dL (22 Aug 2024 08:52)  POCT Blood Glucose.: 225 mg/dL (21 Aug 2024 20:56)  POCT Blood Glucose.: 162 mg/dL (21 Aug 2024 17:51)        Urinalysis Basic - ( 22 Aug 2024 05:10 )    Color: x / Appearance: x / SG: x / pH: x  Gluc: 177 mg/dL / Ketone: x  / Bili: x / Urobili: x   Blood: x / Protein: x / Nitrite: x   Leuk Esterase: x / RBC: x / WBC x   Sq Epi: x / Non Sq Epi: x / Bacteria: x        MEDICATIONS  (STANDING):  amLODIPine   Tablet 5 milliGRAM(s) Oral daily  aspirin  chewable 81 milliGRAM(s) Oral daily  atorvastatin 40 milliGRAM(s) Oral at bedtime  chlorhexidine 2% Cloths 1 Application(s) Topical daily  dextrose 5%. 1000 milliLiter(s) (50 mL/Hr) IV Continuous <Continuous>  dextrose 5%. 1000 milliLiter(s) (100 mL/Hr) IV Continuous <Continuous>  dextrose 50% Injectable 25 Gram(s) IV Push once  dextrose 50% Injectable 12.5 Gram(s) IV Push once  dextrose 50% Injectable 25 Gram(s) IV Push once  gabapentin 100 milliGRAM(s) Oral daily  glucagon  Injectable 1 milliGRAM(s) IntraMuscular once  insulin lispro (ADMELOG) corrective regimen sliding scale   SubCutaneous at bedtime  insulin lispro (ADMELOG) corrective regimen sliding scale   SubCutaneous three times a day before meals  metoprolol succinate  milliGRAM(s) Oral daily    MEDICATIONS  (PRN):  acetaminophen     Tablet .. 650 milliGRAM(s) Oral every 6 hours PRN Temp greater or equal to 38C (100.4F), Mild Pain (1 - 3), Moderate Pain (4 - 6), Severe Pain (7 - 10)  dextrose Oral Gel 15 Gram(s) Oral once PRN Blood Glucose LESS THAN 70 milliGRAM(s)/deciliter          PHYSICAL EXAM:  GENERAL: NAD, well-groomed, well-developed  HEAD:  Atraumatic, Normocephalic  CHEST/LUNG: Clear to percussion bilaterally; No rales, rhonchi, wheezing, or rubs  HEART: Regular rate and rhythm; No murmurs, rubs, or gallops  ABDOMEN: Soft, Nontender, Nondistended; Bowel sounds present  EXTREMITIES:  2+ Peripheral Pulses, No clubbing, cyanosis, or edema  LYMPH: No lymphadenopathy noted  SKIN: No rashes or lesions    Care Discussed with Consultants/Other Providers [ ] YES  [ ] NO
Patient is a 79y old  Female who presents with a chief complaint of Fall (30 Aug 2024 10:07)      INTERVAL HPI/OVERNIGHT EVENTS: seen and examined , still c/o dysuria   T(C): 36.9 (24 @ 21:40), Max: 37 (24 @ 17:07)  HR: 71 (24 @ 21:40) (71 - 83)  BP: 143/60 (24 @ 21:40) (116/58 - 154/65)  RR: 18 (24 @ 21:40) (18 - 18)  SpO2: 100% (24 @ 21:40) (98% - 100%)  Wt(kg): --  I&O's Summary    29 Aug 2024 07:  -  30 Aug 2024 07:00  --------------------------------------------------------  IN: 450 mL / OUT: 1900 mL / NET: -1450 mL    30 Aug 2024 07:  -  30 Aug 2024 22:26  --------------------------------------------------------  IN: 720 mL / OUT: 700 mL / NET: 20 mL        PAST MEDICAL & SURGICAL HISTORY:  Abdominal obesity      Benign essential hypertension      CAD (coronary atherosclerotic disease)  s/p stent      DM type 2 (diabetes mellitus, type 2)      Dyslipidemia      H/O: hysterectomy      S/P  Section      S/P hemorrhoidectomy      History of cholecystectomy          SOCIAL HISTORY  Alcohol:  Tobacco:  Illicit substance use:    FAMILY HISTORY:    REVIEW OF SYSTEMS:  CONSTITUTIONAL: No fever, weight loss, or fatigue  EYES: No eye pain, visual disturbances, or discharge  ENMT:  No difficulty hearing, tinnitus, vertigo; No sinus or throat pain  NECK: No pain or stiffness  RESPIRATORY: No cough, wheezing, chills or hemoptysis; No shortness of breath  CARDIOVASCULAR: No chest pain, palpitations, dizziness, or leg swelling  GASTROINTESTINAL: No abdominal or epigastric pain. No nausea, vomiting, or hematemesis; No diarrhea or constipation. No melena or hematochezia.  GENITOURINARY: No dysuria, frequency, hematuria, or incontinence  NEUROLOGICAL: No headaches, memory loss, loss of strength, numbness, or tremors  SKIN: No itching, burning, rashes, or lesions   LYMPH NODES: No enlarged glands  ENDOCRINE: No heat or cold intolerance; No hair loss  MUSCULOSKELETAL: No joint pain or swelling; No muscle, back, or extremity pain  PSYCHIATRIC: No depression, anxiety, mood swings, or difficulty sleeping  HEME/LYMPH: No easy bruising, or bleeding gums  ALLERY AND IMMUNOLOGIC: No hives or eczema    RADIOLOGY & ADDITIONAL TESTS:    Imaging Personally Reviewed:  [ ] YES  [ ] NO    Consultant(s) Notes Reviewed:  [ ] YES  [ ] NO    PHYSICAL EXAM:  GENERAL: NAD, well-groomed, well-developed  HEAD:  Atraumatic, Normocephalic  EYES: EOMI, PERRLA, conjunctiva and sclera clear  ENMT: No tonsillar erythema, exudates, or enlargement; Moist mucous membranes, Good dentition, No lesions  NECK: Supple, No JVD, Normal thyroid  NERVOUS SYSTEM:  Alert & Oriented X3, Good concentration; Motor Strength 5/5 B/L upper and lower extremities; DTRs 2+ intact and symmetric  CHEST/LUNG: Clear to percussion bilaterally; No rales, rhonchi, wheezing, or rubs  HEART: Regular rate and rhythm; No murmurs, rubs, or gallops  ABDOMEN: Soft, Nontender, Nondistended; Bowel sounds present  EXTREMITIES:  2+ Peripheral Pulses, No clubbing, cyanosis, or edema  LYMPH: No lymphadenopathy noted  SKIN: No rashes or lesions    LABS:                        10.4   12.06 )-----------( 331      ( 30 Aug 2024 06:10 )             31.9     08-30    133<L>  |  101  |  25<H>  ----------------------------<  182<H>  4.6   |  22  |  1.08    Ca    9.0      30 Aug 2024 06:10  Phos  3.7     08-30  Mg     1.60     08-30        Urinalysis Basic - ( 30 Aug 2024 06:10 )    Color: x / Appearance: x / SG: x / pH: x  Gluc: 182 mg/dL / Ketone: x  / Bili: x / Urobili: x   Blood: x / Protein: x / Nitrite: x   Leuk Esterase: x / RBC: x / WBC x   Sq Epi: x / Non Sq Epi: x / Bacteria: x      CAPILLARY BLOOD GLUCOSE      POCT Blood Glucose.: 126 mg/dL (30 Aug 2024 21:27)  POCT Blood Glucose.: 202 mg/dL (30 Aug 2024 17:53)  POCT Blood Glucose.: 289 mg/dL (30 Aug 2024 12:31)  POCT Blood Glucose.: 219 mg/dL (30 Aug 2024 09:08)        Urinalysis Basic - ( 30 Aug 2024 06:10 )    Color: x / Appearance: x / SG: x / pH: x  Gluc: 182 mg/dL / Ketone: x  / Bili: x / Urobili: x   Blood: x / Protein: x / Nitrite: x   Leuk Esterase: x / RBC: x / WBC x   Sq Epi: x / Non Sq Epi: x / Bacteria: x        MEDICATIONS  (STANDING):  aspirin  chewable 81 milliGRAM(s) Oral daily  atorvastatin 40 milliGRAM(s) Oral at bedtime  chlorhexidine 2% Cloths 1 Application(s) Topical daily  dextrose 5%. 1000 milliLiter(s) (50 mL/Hr) IV Continuous <Continuous>  dextrose 5%. 1000 milliLiter(s) (100 mL/Hr) IV Continuous <Continuous>  dextrose 50% Injectable 25 Gram(s) IV Push once  dextrose 50% Injectable 25 Gram(s) IV Push once  dextrose 50% Injectable 12.5 Gram(s) IV Push once  gabapentin 100 milliGRAM(s) Oral daily  glucagon  Injectable 1 milliGRAM(s) IntraMuscular once  insulin glargine Injectable (LANTUS) 12 Unit(s) SubCutaneous at bedtime  insulin lispro (ADMELOG) corrective regimen sliding scale   SubCutaneous three times a day before meals  insulin lispro (ADMELOG) corrective regimen sliding scale   SubCutaneous at bedtime  insulin lispro Injectable (ADMELOG) 3 Unit(s) SubCutaneous three times a day before meals  labetalol 200 milliGRAM(s) Oral two times a day  levoFLOXacin IVPB      levoFLOXacin IVPB 750 milliGRAM(s) IV Intermittent every 24 hours  lidocaine   4% Patch 1 Patch Transdermal every 24 hours  lisinopril 20 milliGRAM(s) Oral daily  NIFEdipine XL 90 milliGRAM(s) Oral daily  polyethylene glycol 3350 17 Gram(s) Oral daily  sodium bicarbonate 650 milliGRAM(s) Oral two times a day  sodium chloride 1.5%. 500 milliLiter(s) (50 mL/Hr) IV Continuous <Continuous>  sodium chloride 2% . 1000 milliLiter(s) (50 mL/Hr) IV Continuous <Continuous>    MEDICATIONS  (PRN):  acetaminophen     Tablet .. 650 milliGRAM(s) Oral every 6 hours PRN Temp greater or equal to 38C (100.4F), Mild Pain (1 - 3), Moderate Pain (4 - 6)  aluminum hydroxide/magnesium hydroxide/simethicone Suspension 30 milliLiter(s) Oral every 4 hours PRN Dyspepsia  dextrose Oral Gel 15 Gram(s) Oral once PRN Blood Glucose LESS THAN 70 milliGRAM(s)/deciliter  oxyCODONE    IR 2.5 milliGRAM(s) Oral every 8 hours PRN Severe Pain (7 - 10)  petrolatum white Ointment 1 Application(s) Topical every 8 hours PRN chapped lips      Care Discussed with Consultants/Other Providers [ ] YES  [ ] NO
[facility] [place] [facility]   MD MAURIZIO CLARK MD ANGELA WONG, PA    TEL:  OFFICE: [phone]  From 5pm-7am Answering Service [phone]    -- RENAL FOLLOW UP NOTE ---Date of Service 08-29-24 @ 09:55    Patient is a 79y old  Female who presents with a chief complaint of Fall (29 Aug 2024 08:28)      Patient seen and examined at bedside. No chest pain/sob    VITALS:  T(F): 98.2 (08-29-24 @ 06:17), Max: 99.3 (08-28-24 @ 17:26)  HR: 79 (08-29-24 @ 06:17)  BP: 133/58 (08-29-24 @ 06:17)  RR: 18 (08-29-24 @ 06:17)  SpO2: 99% (08-29-24 @ 06:17)  Wt(kg): --    08-28 @ 07:01  -  08-29 @ 07:00  --------------------------------------------------------  IN: 600 mL / OUT: 1450 mL / NET: -850 mL          PHYSICAL EXAM:  General: NAD  Neck: No JVD  Respiratory: CTAB, no wheezes, rales or rhonchi  Cardiovascular: S1, S2, RRR  Gastrointestinal: BS+, soft, NT/ND  Extremities: No peripheral edema    Hospital Medications:   MEDICATIONS  (STANDING):  aspirin  chewable 81 milliGRAM(s) Oral daily  atorvastatin 40 milliGRAM(s) Oral at bedtime  chlorhexidine 2% Cloths 1 Application(s) Topical daily  dextrose 5%. 1000 milliLiter(s) (50 mL/Hr) IV Continuous <Continuous>  dextrose 5%. 1000 milliLiter(s) (100 mL/Hr) IV Continuous <Continuous>  dextrose 50% Injectable 25 Gram(s) IV Push once  dextrose 50% Injectable 25 Gram(s) IV Push once  dextrose 50% Injectable 12.5 Gram(s) IV Push once  gabapentin 100 milliGRAM(s) Oral daily  glucagon  Injectable 1 milliGRAM(s) IntraMuscular once  insulin glargine Injectable (LANTUS) 12 Unit(s) SubCutaneous at bedtime  insulin lispro (ADMELOG) corrective regimen sliding scale   SubCutaneous at bedtime  insulin lispro (ADMELOG) corrective regimen sliding scale   SubCutaneous three times a day before meals  insulin lispro Injectable (ADMELOG) 3 Unit(s) SubCutaneous three times a day before meals  labetalol 200 milliGRAM(s) Oral two times a day  levoFLOXacin IVPB 750 milliGRAM(s) IV Intermittent every 24 hours  levoFLOXacin IVPB      lidocaine   4% Patch 1 Patch Transdermal every 24 hours  lisinopril 20 milliGRAM(s) Oral daily  NIFEdipine XL 90 milliGRAM(s) Oral daily  polyethylene glycol 3350 17 Gram(s) Oral daily  sodium bicarbonate 650 milliGRAM(s) Oral two times a day  sodium chloride 1.5%. 500 milliLiter(s) (50 mL/Hr) IV Continuous <Continuous>      LABS:  08-29    127<L>  |  95<L>  |  36<H>  ----------------------------<  200<H>  4.8   |  22  |  1.15    Ca    9.0      29 Aug 2024 06:30  Phos  4.0     08-29  Mg     1.80     08-29      Creatinine Trend: 1.15 <--, 1.18 <--, 1.03 <--, 0.95 <--, 1.02 <--, 1.01 <--, 1.01 <--, 0.67 <--    Phosphorus: 4.0 mg/dL (08-29 @ 06:30)  Phosphorus: 3.1 mg/dL (08-28 @ 19:35)                              10.4   12.67 )-----------( 332      ( 29 Aug 2024 06:30 )             31.3     Urine Studies:  Urinalysis - [08-29-24 @ 06:30]      Color  / Appearance  / SG  / pH       Gluc 200 / Ketone   / Bili  / Urobili        Blood  / Protein  / Leuk Est  / Nitrite       RBC  / WBC  / Hyaline  / Gran  / Sq Epi  / Non Sq Epi  / Bacteria       TSH 1.12      [08-21-24 @ 06:01]  Lipid: chol 139, TG 72, HDL 46, LDL --      [08-22-24 @ 05:10]        RADIOLOGY & ADDITIONAL STUDIES:  
    Subjective: Patient seen and examined. No new events except as noted.     SUBJECTIVE/ROS:  MEDICATIONS:  MEDICATIONS  (STANDING):  aspirin  chewable 81 milliGRAM(s) Oral daily  atorvastatin 40 milliGRAM(s) Oral at bedtime  chlorhexidine 2% Cloths 1 Application(s) Topical daily  dextrose 5%. 1000 milliLiter(s) (50 mL/Hr) IV Continuous <Continuous>  dextrose 5%. 1000 milliLiter(s) (100 mL/Hr) IV Continuous <Continuous>  dextrose 50% Injectable 25 Gram(s) IV Push once  dextrose 50% Injectable 12.5 Gram(s) IV Push once  dextrose 50% Injectable 25 Gram(s) IV Push once  ertapenem  IVPB 1000 milliGRAM(s) IV Intermittent every 24 hours  gabapentin 100 milliGRAM(s) Oral daily  glucagon  Injectable 1 milliGRAM(s) IntraMuscular once  insulin glargine Injectable (LANTUS) 12 Unit(s) SubCutaneous at bedtime  insulin lispro (ADMELOG) corrective regimen sliding scale   SubCutaneous three times a day before meals  insulin lispro (ADMELOG) corrective regimen sliding scale   SubCutaneous at bedtime  insulin lispro Injectable (ADMELOG) 3 Unit(s) SubCutaneous three times a day before meals  labetalol 200 milliGRAM(s) Oral two times a day  lidocaine   4% Patch 1 Patch Transdermal every 24 hours  lisinopril 20 milliGRAM(s) Oral daily  NIFEdipine XL 90 milliGRAM(s) Oral daily  polyethylene glycol 3350 17 Gram(s) Oral daily  sodium bicarbonate 650 milliGRAM(s) Oral two times a day      PHYSICAL EXAM:  T(C): 36.4 (09-01-24 @ 05:56), Max: 37.3 (08-31-24 @ 21:10)  HR: 77 (09-01-24 @ 05:56) (65 - 92)  BP: 153/62 (09-01-24 @ 05:56) (123/44 - 153/62)  RR: 18 (09-01-24 @ 05:56) (18 - 18)  SpO2: 100% (09-01-24 @ 05:56) (100% - 100%)  Wt(kg): --  I&O's Summary    31 Aug 2024 07:01  -  01 Sep 2024 07:00  --------------------------------------------------------  IN: 940 mL / OUT: 825 mL / NET: 115 mL            JVP: Normal  Neck: supple  Lung: clear   CV: S1 S2 , Murmur:  Abd: soft  Ext: No edema  neuro: Awake / alert  Psych: flat affect  Skin: normal``    LABS/DATA:    CARDIAC MARKERS:                                10.9   14.91 )-----------( 340      ( 01 Sep 2024 03:50 )             32.7     09-01    132<L>  |  96<L>  |  16  ----------------------------<  165<H>  4.4   |  20<L>  |  0.91    Ca    9.1      01 Sep 2024 03:50  Phos  3.4     09-01  Mg     1.70     09-01      proBNP:   Lipid Profile:   HgA1c:   TSH:     TELE:  EKG:        
    Subjective: Patient seen and examined. No new events except as noted.     SUBJECTIVE/ROS:  nad      MEDICATIONS:  MEDICATIONS  (STANDING):  aspirin  chewable 81 milliGRAM(s) Oral daily  atorvastatin 40 milliGRAM(s) Oral at bedtime  chlorhexidine 2% Cloths 1 Application(s) Topical daily  dextrose 5%. 1000 milliLiter(s) (50 mL/Hr) IV Continuous <Continuous>  dextrose 5%. 1000 milliLiter(s) (100 mL/Hr) IV Continuous <Continuous>  dextrose 50% Injectable 25 Gram(s) IV Push once  dextrose 50% Injectable 12.5 Gram(s) IV Push once  dextrose 50% Injectable 25 Gram(s) IV Push once  gabapentin 100 milliGRAM(s) Oral daily  glucagon  Injectable 1 milliGRAM(s) IntraMuscular once  insulin glargine Injectable (LANTUS) 12 Unit(s) SubCutaneous at bedtime  insulin lispro (ADMELOG) corrective regimen sliding scale   SubCutaneous three times a day before meals  insulin lispro (ADMELOG) corrective regimen sliding scale   SubCutaneous at bedtime  insulin lispro Injectable (ADMELOG) 3 Unit(s) SubCutaneous three times a day before meals  labetalol 200 milliGRAM(s) Oral two times a day  levoFLOXacin IVPB 750 milliGRAM(s) IV Intermittent every 24 hours  levoFLOXacin IVPB      lidocaine   4% Patch 1 Patch Transdermal every 24 hours  lisinopril 20 milliGRAM(s) Oral daily  NIFEdipine XL 90 milliGRAM(s) Oral daily  polyethylene glycol 3350 17 Gram(s) Oral daily  sodium bicarbonate 650 milliGRAM(s) Oral two times a day  sodium chloride 1.5%. 500 milliLiter(s) (50 mL/Hr) IV Continuous <Continuous>  sodium chloride 2% . 1000 milliLiter(s) (50 mL/Hr) IV Continuous <Continuous>      PHYSICAL EXAM:  T(C): 36.8 (08-30-24 @ 05:25), Max: 36.9 (08-29-24 @ 16:21)  HR: 83 (08-30-24 @ 05:25) (71 - 83)  BP: 116/58 (08-30-24 @ 05:25) (113/40 - 136/44)  RR: 18 (08-30-24 @ 05:25) (18 - 18)  SpO2: 98% (08-30-24 @ 05:25) (98% - 100%)  Wt(kg): --  I&O's Summary    29 Aug 2024 07:01  -  30 Aug 2024 07:00  --------------------------------------------------------  IN: 450 mL / OUT: 1900 mL / NET: -1450 mL            JVP: Normal  Neck: supple  Lung: clear   CV: S1 S2 , Murmur:  Abd: soft  Ext: No edema  neuro: Awake / alert  Psych: flat affect  Skin: normal``    LABS/DATA:    CARDIAC MARKERS:                                10.4   12.06 )-----------( 331      ( 30 Aug 2024 06:10 )             31.9     08-30    133<L>  |  101  |  25<H>  ----------------------------<  182<H>  4.6   |  22  |  1.08    Ca    9.0      30 Aug 2024 06:10  Phos  3.7     08-30  Mg     1.60     08-30      proBNP:   Lipid Profile:   HgA1c:   TSH:     TELE:  EKG:         
[facility] [place] [facility]   MD MAURIZIO CLARK MD ANGELA WONG, PA    TEL:  OFFICE: [phone]  From 5pm-7am Answering Service [phone]    -- RENAL FOLLOW UP NOTE ---Date of Service 08-23-24 @ 10:14    Patient is a 79y old  Female who presents with a chief complaint of Fall (23 Aug 2024 07:56)      Patient seen and examined at bedside. No chest pain/sob, c/o left tight pain    VITALS:  T(F): 97.6 (08-23-24 @ 06:15), Max: 97.7 (08-22-24 @ 10:47)  HR: 75 (08-23-24 @ 06:15)  BP: 167/62 (08-23-24 @ 06:15)  RR: 16 (08-23-24 @ 06:15)  SpO2: 100% (08-23-24 @ 06:15)  Wt(kg): --    08-22 @ 07:01  -  08-23 @ 07:00  --------------------------------------------------------  IN: 0 mL / OUT: 1050 mL / NET: -1050 mL      Height (cm): 160 (08-23 @ 06:15)    PHYSICAL EXAM:  General: NAD  Neck: No JVD  Respiratory: CTAB, no wheezes, rales or rhonchi  Cardiovascular: S1, S2, RRR  Gastrointestinal: BS+, soft, NT/ND  Extremities: No peripheral edema    Hospital Medications:   MEDICATIONS  (STANDING):  amLODIPine   Tablet 5 milliGRAM(s) Oral daily  aspirin  chewable 81 milliGRAM(s) Oral daily  atorvastatin 40 milliGRAM(s) Oral at bedtime  chlorhexidine 2% Cloths 1 Application(s) Topical daily  dextrose 5%. 1000 milliLiter(s) (50 mL/Hr) IV Continuous <Continuous>  dextrose 5%. 1000 milliLiter(s) (100 mL/Hr) IV Continuous <Continuous>  dextrose 50% Injectable 25 Gram(s) IV Push once  dextrose 50% Injectable 25 Gram(s) IV Push once  dextrose 50% Injectable 12.5 Gram(s) IV Push once  gabapentin 100 milliGRAM(s) Oral daily  glucagon  Injectable 1 milliGRAM(s) IntraMuscular once  insulin lispro (ADMELOG) corrective regimen sliding scale   SubCutaneous three times a day before meals  insulin lispro (ADMELOG) corrective regimen sliding scale   SubCutaneous at bedtime  metoprolol succinate  milliGRAM(s) Oral daily      LABS:  08-22    131<L>  |  94<L>  |  20  ----------------------------<  177<H>  4.2   |  22  |  0.88    Ca    9.6      22 Aug 2024 05:10  Phos  3.9     08-22  Mg     1.50     08-22      Creatinine Trend: 0.88 <--, 0.80 <--, 0.60 <--, 0.90 <--                                11.7   10.82 )-----------( 373      ( 22 Aug 2024 05:10 )             34.7     Urine Studies:  Urinalysis - [08-22-24 @ 05:10]      Color  / Appearance  / SG  / pH       Gluc 177 / Ketone   / Bili  / Urobili        Blood  / Protein  / Leuk Est  / Nitrite       RBC  / WBC  / Hyaline  / Gran  / Sq Epi  / Non Sq Epi  / Bacteria     Urine Sodium 36      [08-21-24 @ 11:20]  Urine Osmolality 496      [08-21-24 @ 11:20]    TSH 1.12      [08-21-24 @ 06:01]  Lipid: chol 139, TG 72, HDL 46, LDL --      [08-22-24 @ 05:10]        RADIOLOGY & ADDITIONAL STUDIES:  
Neurology Progress Note    S: Patient seen and examined. No new events overnight.     Medication:  acetaminophen     Tablet .. 650 milliGRAM(s) Oral every 6 hours PRN  amLODIPine   Tablet 5 milliGRAM(s) Oral daily  aspirin  chewable 81 milliGRAM(s) Oral daily  atorvastatin 40 milliGRAM(s) Oral at bedtime  chlorhexidine 2% Cloths 1 Application(s) Topical daily  dextrose 5%. 1000 milliLiter(s) IV Continuous <Continuous>  dextrose 5%. 1000 milliLiter(s) IV Continuous <Continuous>  dextrose 50% Injectable 25 Gram(s) IV Push once  dextrose 50% Injectable 25 Gram(s) IV Push once  dextrose 50% Injectable 12.5 Gram(s) IV Push once  dextrose Oral Gel 15 Gram(s) Oral once PRN  gabapentin 100 milliGRAM(s) Oral daily  glucagon  Injectable 1 milliGRAM(s) IntraMuscular once  insulin lispro (ADMELOG) corrective regimen sliding scale   SubCutaneous three times a day before meals  insulin lispro (ADMELOG) corrective regimen sliding scale   SubCutaneous at bedtime  metoprolol succinate  milliGRAM(s) Oral daily  sodium bicarbonate 650 milliGRAM(s) Oral three times a day      Vitals:  Vital Signs Last 24 Hrs  T(C): 36.5 (22 Aug 2024 10:47), Max: 36.7 (21 Aug 2024 17:25)  T(F): 97.7 (22 Aug 2024 10:47), Max: 98.1 (21 Aug 2024 17:25)  HR: 68 (22 Aug 2024 10:47) (68 - 76)  BP: 156/62 (22 Aug 2024 10:47) (146/65 - 160/68)  BP(mean): --  RR: 18 (22 Aug 2024 10:47) (16 - 18)  SpO2: 97% (22 Aug 2024 10:47) (97% - 100%)    Parameters below as of 22 Aug 2024 10:47  Patient On (Oxygen Delivery Method): room air        General Exam:   General Appearance: Appropriately dressed and in no acute distress       Head: Normocephalic, atraumatic and no dysmorphic features  Ear, Nose, and Throat: Moist mucous membranes  CVS: S1S2+  Resp: No SOB, no wheeze or rhonchi  Abd: soft NTND  Extremities: No edema, no cyanosis  Skin: No bruises, no rashes     Neurological Exam:  Mental Status: Awake, alert and oriented x 3.  Able to follow simple and complex verbal commands. Able to name and repeat. fluent speech. No obvious aphasia or dysarthria noted.   Cranial Nerves: PERRL, EOMI, VFFC, sensation V1-V3 intact,  L NLF,  equal elevation of palate, scm/trap 5/5, tongue is midline on protrusion.  hearing is grossly intact.   Motor: LUE and LLE drift   Sensation: dec on the L    Reflexes: 1+ throughout at biceps, brachioradialis, triceps, patellars and ankles bilaterally and equal. No clonus. R toe and L toe were both downgoing.  Coordination: No dysmetria on FNF  Gait: deferred      I personally reviewed the below data/images/labs:      CBC Full  -  ( 22 Aug 2024 05:10 )  WBC Count : 10.82 K/uL  RBC Count : 4.09 M/uL  Hemoglobin : 11.7 g/dL  Hematocrit : 34.7 %  Platelet Count - Automated : 373 K/uL  Mean Cell Volume : 84.8 fL  Mean Cell Hemoglobin : 28.6 pg  Mean Cell Hemoglobin Concentration : 33.7 gm/dL  Auto Neutrophil # : x  Auto Lymphocyte # : x  Auto Monocyte # : x  Auto Eosinophil # : x  Auto Basophil # : x  Auto Neutrophil % : xn  Auto Lymphocyte % : x  Auto Monocyte % : x  Auto Eosinophil % : x  Auto Basophil % : x    08-22    131<L>  |  94<L>  |  20  ----------------------------<  177<H>  4.2   |  22  |  0.88    Ca    9.6      22 Aug 2024 05:10  Phos  3.9     08-22  Mg     1.50     08-22          Urinalysis Basic - ( 22 Aug 2024 05:10 )    Color: x / Appearance: x / SG: x / pH: x  Gluc: 177 mg/dL / Ketone: x  / Bili: x / Urobili: x   Blood: x / Protein: x / Nitrite: x   Leuk Esterase: x / RBC: x / WBC x   Sq Epi: x / Non Sq Epi: x / Bacteria: x      < from: CT Brain Stroke Protocol (06.09.24 @ 14:51) >    ACC: [id number] EXAM:  CT BRAIN STROKE PROTOCOL   ORDERED BY: SARA VAZQUEZ DATE:  06/09/2024          INTERPRETATION:  CLINICAL INFORMATION: Code stroke, 10 min episode of   aphasia that's returned to baseline, last known normal at 1030am.  NIHHS   0    TECHNIQUE: Noncontrast axial CT images were acquired through the head.   Two-dimensional sagittal and coronal reformats were generated.    COMPARISON STUDY: MR brain 1/27/2015, CT brain 5/30/2024    FINDINGS:    No acute intracranial hemorrhage, mass effect, vasogenic edema, or   evidence of acute territorial infarct.    Ventricle size is proportionate for age.  Mild prominent gyri and foci   particularly of the left frontotemporal region minimally changed from   5/30/2024. There is mild patchy white matter hypoattenuation which is   nonspecific in etiology but likely related to chronic microvascular   changes.    The visualized paranasal sinuses are clear. The mastoid air cells and   middle ear cavities are clear.    The globes are unremarkable.    The soft tissues of the scalp are unremarkable. The calvarium is intact.    IMPRESSION:    No CT evidence of acute intracranial hemorrhage, brain edema, or mass   effect. Consider MRI as clinically warranted.    Findings discussed with Dr. Rodriguez from neurology at 2:43 pm on 6/9/2024   by YESSY Veloz MD.    < end of copied text >  < from: MRI Head w/wo Cont (01.27.15 @ 19:11) >    IMPRESSION: Evidence of intraventricular hemorrhage layering in the   occipital horns of the lateral ventricles without hydrocephalus. Some   residual subarachnoid hemorrhage in the prepontine and suprasellar   regions as well as some trace what appears to be subarachnoid hemorrhage   in the high left temporal parietal region on the susceptibility and   gradient echo sequences. No definite visualization of a fistula.    < end of copied text >      CONCLUSIONS:      1. Left ventricular systolic function is hyperdynamic with an ejection fraction visually estimated at >75 %.   2. Normal right ventricular cavity size, with normal wall thickness, and normal right ventricular systolic function. Tricuspid annular plane systolic excursion (TAPSE) is 2.1 cm (normal >=1.7 cm).   3. Normal left and right atrial size.   4. No significant valvular disease.   5. No pericardial effusion seen.   6. Hyperdynamic left ventricular systolic function with intra-cavitary gradient of 39 mmHg.   7. The inferior vena cava is normal in size (normal <2.1cm) with normal inspiratory collapse (normal >50%) consistent with normal right atrial pressure (~3, range 0-5mmHg).      
Neurology Progress Note    S: Patient seen and examined. s/p fall this AM, repeat CTh neg    Medications: MEDICATIONS  (STANDING):  aspirin  chewable 81 milliGRAM(s) Oral daily  atorvastatin 40 milliGRAM(s) Oral at bedtime  chlorhexidine 2% Cloths 1 Application(s) Topical daily  dextrose 5%. 1000 milliLiter(s) (50 mL/Hr) IV Continuous <Continuous>  dextrose 5%. 1000 milliLiter(s) (100 mL/Hr) IV Continuous <Continuous>  dextrose 50% Injectable 25 Gram(s) IV Push once  dextrose 50% Injectable 12.5 Gram(s) IV Push once  dextrose 50% Injectable 25 Gram(s) IV Push once  gabapentin 100 milliGRAM(s) Oral daily  glucagon  Injectable 1 milliGRAM(s) IntraMuscular once  insulin glargine Injectable (LANTUS) 8 Unit(s) SubCutaneous at bedtime  insulin lispro (ADMELOG) corrective regimen sliding scale   SubCutaneous at bedtime  insulin lispro (ADMELOG) corrective regimen sliding scale   SubCutaneous three times a day before meals  insulin lispro Injectable (ADMELOG) 3 Unit(s) SubCutaneous three times a day before meals  lidocaine   4% Patch 1 Patch Transdermal every 24 hours  lisinopril 20 milliGRAM(s) Oral daily  metoprolol succinate  milliGRAM(s) Oral daily  mupirocin 2% Ointment 1 Application(s) Topical two times a day  NIFEdipine XL 90 milliGRAM(s) Oral daily  polyethylene glycol 3350 17 Gram(s) Oral daily  sodium chloride 1 Gram(s) Oral three times a day    MEDICATIONS  (PRN):  acetaminophen     Tablet .. 650 milliGRAM(s) Oral every 6 hours PRN Temp greater or equal to 38C (100.4F), Mild Pain (1 - 3), Moderate Pain (4 - 6)  aluminum hydroxide/magnesium hydroxide/simethicone Suspension 30 milliLiter(s) Oral every 4 hours PRN Dyspepsia  dextrose Oral Gel 15 Gram(s) Oral once PRN Blood Glucose LESS THAN 70 milliGRAM(s)/deciliter  oxyCODONE    IR 2.5 milliGRAM(s) Oral every 8 hours PRN Severe Pain (7 - 10)  petrolatum white Ointment 1 Application(s) Topical every 8 hours PRN chapped lips       Vitals:  Vital Signs Last 24 Hrs  T(C): 36.9 (26 Aug 2024 21:03), Max: 37.2 (26 Aug 2024 00:32)  T(F): 98.5 (26 Aug 2024 21:03), Max: 99 (26 Aug 2024 00:32)  HR: 100 (26 Aug 2024 22:20) (65 - 100)  BP: 152/62 (26 Aug 2024 22:20) (125/61 - 176/68)  BP(mean): --  RR: 18 (26 Aug 2024 21:03) (16 - 18)  SpO2: 99% (26 Aug 2024 21:03) (98% - 100%)    Parameters below as of 26 Aug 2024 21:03  Patient On (Oxygen Delivery Method): room air                  General Exam:   General Appearance: Appropriately dressed and in no acute distress       Head: Normocephalic, atraumatic and no dysmorphic features  Ear, Nose, and Throat: Moist mucous membranes  CVS: S1S2+  Resp: No SOB, no wheeze or rhonchi  Abd: soft NTND  Extremities: No edema, no cyanosis  Skin: No bruises, no rashes     Neurological Exam:  Mental Status: Awake, alert and oriented x 3.  Able to follow simple and complex verbal commands. Able to name and repeat. fluent speech. No obvious aphasia or dysarthria noted.   Cranial Nerves: PERRL, EOMI, VFFC, sensation V1-V3 intact,  L NLF,  equal elevation of palate, scm/trap 5/5, tongue is midline on protrusion.  hearing is grossly intact.   Motor: LUE and LLE drift   Sensation: dec on the L    Reflexes: 1+ throughout at biceps, brachioradialis, triceps, patellars and ankles bilaterally and equal. No clonus. R toe and L toe were both downgoing.  Coordination: No dysmetria on FNF  Gait: deferred      I personally reviewed the below data/images/labs:    LABS:                          14.3   21.65 )-----------( 391      ( 26 Aug 2024 15:54 )             43.3     08-26    128<L>  |  89<L>  |  39<H>  ----------------------------<  231<H>  4.9   |  22  |  1.02    Ca    10.5      26 Aug 2024 15:54  Phos  3.3     08-26  Mg     2.30     08-26          Urinalysis Basic - ( 26 Aug 2024 15:54 )    Color: x / Appearance: x / SG: x / pH: x  Gluc: 231 mg/dL / Ketone: x  / Bili: x / Urobili: x   Blood: x / Protein: x / Nitrite: x   Leuk Esterase: x / RBC: x / WBC x   Sq Epi: x / Non Sq Epi: x / Bacteria: x        < from: CT Brain Stroke Protocol (06.09.24 @ 14:51) >    ACC: [id number] EXAM:  CT BRAIN STROKE PROTOCOL   ORDERED BY: ASRA SNIDER     PROCEDURE DATE:  06/09/2024          INTERPRETATION:  CLINICAL INFORMATION: Code stroke, 10 min episode of   aphasia that's returned to baseline, last known normal at 1030am.  NIHHS   0    TECHNIQUE: Noncontrast axial CT images were acquired through the head.   Two-dimensional sagittal and coronal reformats were generated.    COMPARISON STUDY: MR brain 1/27/2015, CT brain 5/30/2024    FINDINGS:    No acute intracranial hemorrhage, mass effect, vasogenic edema, or   evidence of acute territorial infarct.    Ventricle size is proportionate for age.  Mild prominent gyri and foci   particularly of the left frontotemporal region minimally changed from   5/30/2024. There is mild patchy white matter hypoattenuation which is   nonspecific in etiology but likely related to chronic microvascular   changes.    The visualized paranasal sinuses are clear. The mastoid air cells and   middle ear cavities are clear.    The globes are unremarkable.    The soft tissues of the scalp are unremarkable. The calvarium is intact.    IMPRESSION:    No CT evidence of acute intracranial hemorrhage, brain edema, or mass   effect. Consider MRI as clinically warranted.    Findings discussed with Dr. Rodriguez from neurology at 2:43 pm on 6/9/2024   by YESSY Veloz MD.    < end of copied text >  < from: MRI Head w/wo Cont (01.27.15 @ 19:11) >    IMPRESSION: Evidence of intraventricular hemorrhage layering in the   occipital horns of the lateral ventricles without hydrocephalus. Some   residual subarachnoid hemorrhage in the prepontine and suprasellar   regions as well as some trace what appears to be subarachnoid hemorrhage   in the high left temporal parietal region on the susceptibility and   gradient echo sequences. No definite visualization of a fistula.    < end of copied text >      CONCLUSIONS:      1. Left ventricular systolic function is hyperdynamic with an ejection fraction visually estimated at >75 %.   2. Normal right ventricular cavity size, with normal wall thickness, and normal right ventricular systolic function. Tricuspid annular plane systolic excursion (TAPSE) is 2.1 cm (normal >=1.7 cm).   3. Normal left and right atrial size.   4. No significant valvular disease.   5. No pericardial effusion seen.   6. Hyperdynamic left ventricular systolic function with intra-cavitary gradient of 39 mmHg.   7. The inferior vena cava is normal in size (normal <2.1cm) with normal inspiratory collapse (normal >50%) consistent with normal right atrial pressure (~3, range 0-5mmHg).      
Patient is a 79y old  Female who presents with a chief complaint of Fall (24 Aug 2024 12:09)    Date of servie : 08-24-24 @ 17:26  INTERVAL HPI/OVERNIGHT EVENTS:  T(C): 36.8 (08-24-24 @ 17:04), Max: 36.8 (08-24-24 @ 17:04)  HR: 65 (08-24-24 @ 17:04) (57 - 65)  BP: 151/62 (08-24-24 @ 17:04) (147/66 - 192/76)  RR: 18 (08-24-24 @ 17:04) (17 - 18)  SpO2: 98% (08-24-24 @ 17:04) (96% - 100%)  Wt(kg): --  I&O's Summary    23 Aug 2024 07:01  -  24 Aug 2024 07:00  --------------------------------------------------------  IN: 580 mL / OUT: 1000 mL / NET: -420 mL        LABS:                        13.3   11.28 )-----------( 320      ( 24 Aug 2024 06:00 )             40.7     08-24    128<L>  |  93<L>  |  18  ----------------------------<  180<H>  4.2   |  18<L>  |  0.67    Ca    9.5      24 Aug 2024 06:00  Phos  3.6     08-24  Mg     1.60     08-24        Urinalysis Basic - ( 24 Aug 2024 06:00 )    Color: x / Appearance: x / SG: x / pH: x  Gluc: 180 mg/dL / Ketone: x  / Bili: x / Urobili: x   Blood: x / Protein: x / Nitrite: x   Leuk Esterase: x / RBC: x / WBC x   Sq Epi: x / Non Sq Epi: x / Bacteria: x      CAPILLARY BLOOD GLUCOSE      POCT Blood Glucose.: 309 mg/dL (24 Aug 2024 12:37)  POCT Blood Glucose.: 213 mg/dL (24 Aug 2024 09:07)  POCT Blood Glucose.: 177 mg/dL (23 Aug 2024 21:50)  POCT Blood Glucose.: 131 mg/dL (23 Aug 2024 17:35)        Urinalysis Basic - ( 24 Aug 2024 06:00 )    Color: x / Appearance: x / SG: x / pH: x  Gluc: 180 mg/dL / Ketone: x  / Bili: x / Urobili: x   Blood: x / Protein: x / Nitrite: x   Leuk Esterase: x / RBC: x / WBC x   Sq Epi: x / Non Sq Epi: x / Bacteria: x        MEDICATIONS  (STANDING):  aspirin  chewable 81 milliGRAM(s) Oral daily  atorvastatin 40 milliGRAM(s) Oral at bedtime  chlorhexidine 2% Cloths 1 Application(s) Topical daily  dextrose 5%. 1000 milliLiter(s) (50 mL/Hr) IV Continuous <Continuous>  dextrose 5%. 1000 milliLiter(s) (100 mL/Hr) IV Continuous <Continuous>  dextrose 50% Injectable 25 Gram(s) IV Push once  dextrose 50% Injectable 12.5 Gram(s) IV Push once  dextrose 50% Injectable 25 Gram(s) IV Push once  gabapentin 100 milliGRAM(s) Oral daily  glucagon  Injectable 1 milliGRAM(s) IntraMuscular once  insulin lispro (ADMELOG) corrective regimen sliding scale   SubCutaneous three times a day before meals  insulin lispro (ADMELOG) corrective regimen sliding scale   SubCutaneous at bedtime  metoprolol succinate  milliGRAM(s) Oral daily  mupirocin 2% Ointment 1 Application(s) Topical two times a day  NIFEdipine XL 90 milliGRAM(s) Oral daily    MEDICATIONS  (PRN):  acetaminophen     Tablet .. 650 milliGRAM(s) Oral every 6 hours PRN Temp greater or equal to 38C (100.4F), Mild Pain (1 - 3), Moderate Pain (4 - 6)  dextrose Oral Gel 15 Gram(s) Oral once PRN Blood Glucose LESS THAN 70 milliGRAM(s)/deciliter  oxyCODONE    IR 2.5 milliGRAM(s) Oral every 8 hours PRN Severe Pain (7 - 10)  petrolatum white Ointment 1 Application(s) Topical every 8 hours PRN chapped lips          PHYSICAL EXAM:  GENERAL: NAD, well-groomed, well-developed  HEAD:  Atraumatic, Normocephalic  CHEST/LUNG: Clear to percussion bilaterally; No rales, rhonchi, wheezing, or rubs  HEART: Regular rate and rhythm; No murmurs, rubs, or gallops  ABDOMEN: Soft, Nontender, Nondistended; Bowel sounds present  EXTREMITIES:  2+ Peripheral Pulses, No clubbing, cyanosis, or edema  LYMPH: No lymphadenopathy noted  SKIN: No rashes or lesions    Care Discussed with Consultants/Other Providers [ ] YES  [ ] NO
Patient is a 79y old  Female who presents with a chief complaint of Fall (28 Aug 2024 13:10)      INTERVAL HPI/OVERNIGHT EVENTS: sod level decreasing  T(C): 37 (24 @ 21:14), Max: 37.4 (24 @ 17:26)  HR: 73 (24 @ 21:14) (64 - 77)  BP: 122/43 (24 @ 21:14) (122/43 - 165/66)  RR: 18 (24 @ 21:14) (18 - 18)  SpO2: 97% (24 @ 21:14) (96% - 100%)  Wt(kg): --  I&O's Summary    27 Aug 2024 07:  -  28 Aug 2024 07:00  --------------------------------------------------------  IN: 960 mL / OUT: 760 mL / NET: 200 mL    28 Aug 2024 07:01  -  28 Aug 2024 23:12  --------------------------------------------------------  IN: 600 mL / OUT: 1450 mL / NET: -850 mL        PAST MEDICAL & SURGICAL HISTORY:  Abdominal obesity      Benign essential hypertension      CAD (coronary atherosclerotic disease)  s/p stent      DM type 2 (diabetes mellitus, type 2)      Dyslipidemia      H/O: hysterectomy      S/P  Section      S/P hemorrhoidectomy      History of cholecystectomy          SOCIAL HISTORY  Alcohol:  Tobacco:  Illicit substance use:    FAMILY HISTORY:    REVIEW OF SYSTEMS:  CONSTITUTIONAL: No fever, weight loss, or fatigue  EYES: No eye pain, visual disturbances, or discharge  ENMT:  No difficulty hearing, tinnitus, vertigo; No sinus or throat pain  NECK: No pain or stiffness  RESPIRATORY: No cough, wheezing, chills or hemoptysis; No shortness of breath  CARDIOVASCULAR: No chest pain, palpitations, dizziness, or leg swelling  GASTROINTESTINAL: No abdominal or epigastric pain. No nausea, vomiting, or hematemesis; No diarrhea or constipation. No melena or hematochezia.  GENITOURINARY: No dysuria, frequency, hematuria, or incontinence  NEUROLOGICAL: No headaches, memory loss, loss of strength, numbness, or tremors  SKIN: No itching, burning, rashes, or lesions   LYMPH NODES: No enlarged glands  ENDOCRINE: No heat or cold intolerance; No hair loss  MUSCULOSKELETAL: No joint pain or swelling; No muscle, back, or extremity pain  PSYCHIATRIC: No depression, anxiety, mood swings, or difficulty sleeping  HEME/LYMPH: No easy bruising, or bleeding gums  ALLERY AND IMMUNOLOGIC: No hives or eczema    RADIOLOGY & ADDITIONAL TESTS:    Imaging Personally Reviewed:  [ ] YES  [ ] NO    Consultant(s) Notes Reviewed:  [ ] YES  [ ] NO    PHYSICAL EXAM:  GENERAL: NAD, well-groomed, well-developed  HEAD:  Atraumatic, Normocephalic  EYES: EOMI, PERRLA, conjunctiva and sclera clear  ENMT: No tonsillar erythema, exudates, or enlargement; Moist mucous membranes, Good dentition, No lesions  NECK: Supple, No JVD, Normal thyroid  NERVOUS SYSTEM:  Alert & Oriented X3, Good concentration; Motor Strength 5/5 B/L upper and lower extremities; DTRs 2+ intact and symmetric  CHEST/LUNG: Clear to percussion bilaterally; No rales, rhonchi, wheezing, or rubs  HEART: Regular rate and rhythm; No murmurs, rubs, or gallops  ABDOMEN: Soft, Nontender, Nondistended; Bowel sounds present  EXTREMITIES:  2+ Peripheral Pulses, No clubbing, cyanosis, or edema  LYMPH: No lymphadenopathy noted  SKIN: No rashes or lesions    LABS:                        10.8   15.84 )-----------( 340      ( 28 Aug 2024 05:16 )             32.0     08-28    125<L>  |  93<L>  |  38<H>  ----------------------------<  229<H>  5.1   |  20<L>  |  1.18    Ca    8.9      28 Aug 2024 19:35  Phos  3.1     08-  Mg     1.70     -        Urinalysis Basic - ( 28 Aug 2024 19:35 )    Color: x / Appearance: x / SG: x / pH: x  Gluc: 229 mg/dL / Ketone: x  / Bili: x / Urobili: x   Blood: x / Protein: x / Nitrite: x   Leuk Esterase: x / RBC: x / WBC x   Sq Epi: x / Non Sq Epi: x / Bacteria: x      CAPILLARY BLOOD GLUCOSE      POCT Blood Glucose.: 271 mg/dL (28 Aug 2024 21:32)  POCT Blood Glucose.: 213 mg/dL (28 Aug 2024 17:43)  POCT Blood Glucose.: 310 mg/dL (28 Aug 2024 12:12)  POCT Blood Glucose.: 331 mg/dL (28 Aug 2024 12:10)  POCT Blood Glucose.: 291 mg/dL (28 Aug 2024 08:58)        Urinalysis Basic - ( 28 Aug 2024 19:35 )    Color: x / Appearance: x / SG: x / pH: x  Gluc: 229 mg/dL / Ketone: x  / Bili: x / Urobili: x   Blood: x / Protein: x / Nitrite: x   Leuk Esterase: x / RBC: x / WBC x   Sq Epi: x / Non Sq Epi: x / Bacteria: x        MEDICATIONS  (STANDING):  aspirin  chewable 81 milliGRAM(s) Oral daily  atorvastatin 40 milliGRAM(s) Oral at bedtime  chlorhexidine 2% Cloths 1 Application(s) Topical daily  dextrose 5%. 1000 milliLiter(s) (50 mL/Hr) IV Continuous <Continuous>  dextrose 5%. 1000 milliLiter(s) (100 mL/Hr) IV Continuous <Continuous>  dextrose 50% Injectable 25 Gram(s) IV Push once  dextrose 50% Injectable 12.5 Gram(s) IV Push once  dextrose 50% Injectable 25 Gram(s) IV Push once  gabapentin 100 milliGRAM(s) Oral daily  glucagon  Injectable 1 milliGRAM(s) IntraMuscular once  insulin glargine Injectable (LANTUS) 12 Unit(s) SubCutaneous at bedtime  insulin lispro (ADMELOG) corrective regimen sliding scale   SubCutaneous three times a day before meals  insulin lispro (ADMELOG) corrective regimen sliding scale   SubCutaneous at bedtime  insulin lispro Injectable (ADMELOG) 3 Unit(s) SubCutaneous three times a day before meals  labetalol 200 milliGRAM(s) Oral two times a day  levoFLOXacin IVPB      levoFLOXacin IVPB 750 milliGRAM(s) IV Intermittent every 24 hours  lidocaine   4% Patch 1 Patch Transdermal every 24 hours  lisinopril 20 milliGRAM(s) Oral daily  NIFEdipine XL 90 milliGRAM(s) Oral daily  polyethylene glycol 3350 17 Gram(s) Oral daily  sodium bicarbonate 650 milliGRAM(s) Oral two times a day    MEDICATIONS  (PRN):  acetaminophen     Tablet .. 650 milliGRAM(s) Oral every 6 hours PRN Temp greater or equal to 38C (100.4F), Mild Pain (1 - 3), Moderate Pain (4 - 6)  aluminum hydroxide/magnesium hydroxide/simethicone Suspension 30 milliLiter(s) Oral every 4 hours PRN Dyspepsia  dextrose Oral Gel 15 Gram(s) Oral once PRN Blood Glucose LESS THAN 70 milliGRAM(s)/deciliter  oxyCODONE    IR 2.5 milliGRAM(s) Oral every 8 hours PRN Severe Pain (7 - 10)  petrolatum white Ointment 1 Application(s) Topical every 8 hours PRN chapped lips      Care Discussed with Consultants/Other Providers [ ] YES  [ ] NO
Patient is a 79y old  Female who presents with a chief complaint of Fall (29 Aug 2024 11:43)      INTERVAL HPI/OVERNIGHT EVENTS: seen and examined , NAD  T(C): 36.6 (24 @ 21:00), Max: 36.9 (24 @ 16:21)  HR: 71 (24 @ 21:00) (71 - 79)  BP: 127/50 (24 @ 21:00) (113/40 - 136/44)  RR: 18 (24 @ 21:00) (18 - 18)  SpO2: 100% (24 @ 21:00) (99% - 100%)  Wt(kg): --  I&O's Summary    28 Aug 2024 07:  -  29 Aug 2024 07:00  --------------------------------------------------------  IN: 600 mL / OUT: 1450 mL / NET: -850 mL    29 Aug 2024 07:01  -  29 Aug 2024 23:24  --------------------------------------------------------  IN: 0 mL / OUT: 700 mL / NET: -700 mL        PAST MEDICAL & SURGICAL HISTORY:  Abdominal obesity      Benign essential hypertension      CAD (coronary atherosclerotic disease)  s/p stent      DM type 2 (diabetes mellitus, type 2)      Dyslipidemia      H/O: hysterectomy      S/P  Section      S/P hemorrhoidectomy      History of cholecystectomy          SOCIAL HISTORY  Alcohol:  Tobacco:  Illicit substance use:    FAMILY HISTORY:    REVIEW OF SYSTEMS:  CONSTITUTIONAL: No fever, weight loss, or fatigue  EYES: No eye pain, visual disturbances, or discharge  ENMT:  No difficulty hearing, tinnitus, vertigo; No sinus or throat pain  NECK: No pain or stiffness  RESPIRATORY: No cough, wheezing, chills or hemoptysis; No shortness of breath  CARDIOVASCULAR: No chest pain, palpitations, dizziness, or leg swelling  GASTROINTESTINAL: No abdominal or epigastric pain. No nausea, vomiting, or hematemesis; No diarrhea or constipation. No melena or hematochezia.  GENITOURINARY: No dysuria, frequency, hematuria, or incontinence  NEUROLOGICAL: No headaches, memory loss, loss of strength, numbness, or tremors  SKIN: No itching, burning, rashes, or lesions   LYMPH NODES: No enlarged glands  ENDOCRINE: No heat or cold intolerance; No hair loss  MUSCULOSKELETAL: No joint pain or swelling; No muscle, back, or extremity pain  PSYCHIATRIC: No depression, anxiety, mood swings, or difficulty sleeping  HEME/LYMPH: No easy bruising, or bleeding gums  ALLERY AND IMMUNOLOGIC: No hives or eczema    RADIOLOGY & ADDITIONAL TESTS:    Imaging Personally Reviewed:  [ ] YES  [ ] NO    Consultant(s) Notes Reviewed:  [ ] YES  [ ] NO    PHYSICAL EXAM:  GENERAL: NAD, well-groomed, well-developed  HEAD:  Atraumatic, Normocephalic  EYES: EOMI, PERRLA, conjunctiva and sclera clear  ENMT: No tonsillar erythema, exudates, or enlargement; Moist mucous membranes, Good dentition, No lesions  NECK: Supple, No JVD, Normal thyroid  NERVOUS SYSTEM:  Alert & Oriented X3, Good concentration; Motor Strength 5/5 B/L upper and lower extremities; DTRs 2+ intact and symmetric  CHEST/LUNG: Clear to percussion bilaterally; No rales, rhonchi, wheezing, or rubs  HEART: Regular rate and rhythm; No murmurs, rubs, or gallops  ABDOMEN: Soft, Nontender, Nondistended; Bowel sounds present  EXTREMITIES:  2+ Peripheral Pulses, No clubbing, cyanosis, or edema  LYMPH: No lymphadenopathy noted  SKIN: No rashes or lesions    LABS:                        10.4   12.67 )-----------( 332      ( 29 Aug 2024 06:30 )             31.3     08-29    128<L>  |  95<L>  |  32<H>  ----------------------------<  273<H>  5.2   |  22  |  1.20    Ca    9.0      29 Aug 2024 17:00  Phos  3.5     08-29  Mg     1.80     08-29        Urinalysis Basic - ( 29 Aug 2024 17:00 )    Color: x / Appearance: x / SG: x / pH: x  Gluc: 273 mg/dL / Ketone: x  / Bili: x / Urobili: x   Blood: x / Protein: x / Nitrite: x   Leuk Esterase: x / RBC: x / WBC x   Sq Epi: x / Non Sq Epi: x / Bacteria: x      CAPILLARY BLOOD GLUCOSE      POCT Blood Glucose.: 234 mg/dL (29 Aug 2024 21:37)  POCT Blood Glucose.: 223 mg/dL (29 Aug 2024 17:32)  POCT Blood Glucose.: 372 mg/dL (29 Aug 2024 12:25)  POCT Blood Glucose.: 238 mg/dL (29 Aug 2024 08:52)        Urinalysis Basic - ( 29 Aug 2024 17:00 )    Color: x / Appearance: x / SG: x / pH: x  Gluc: 273 mg/dL / Ketone: x  / Bili: x / Urobili: x   Blood: x / Protein: x / Nitrite: x   Leuk Esterase: x / RBC: x / WBC x   Sq Epi: x / Non Sq Epi: x / Bacteria: x        MEDICATIONS  (STANDING):  aspirin  chewable 81 milliGRAM(s) Oral daily  atorvastatin 40 milliGRAM(s) Oral at bedtime  chlorhexidine 2% Cloths 1 Application(s) Topical daily  dextrose 5%. 1000 milliLiter(s) (50 mL/Hr) IV Continuous <Continuous>  dextrose 5%. 1000 milliLiter(s) (100 mL/Hr) IV Continuous <Continuous>  dextrose 50% Injectable 25 Gram(s) IV Push once  dextrose 50% Injectable 25 Gram(s) IV Push once  dextrose 50% Injectable 12.5 Gram(s) IV Push once  gabapentin 100 milliGRAM(s) Oral daily  glucagon  Injectable 1 milliGRAM(s) IntraMuscular once  insulin glargine Injectable (LANTUS) 12 Unit(s) SubCutaneous at bedtime  insulin lispro (ADMELOG) corrective regimen sliding scale   SubCutaneous three times a day before meals  insulin lispro (ADMELOG) corrective regimen sliding scale   SubCutaneous at bedtime  insulin lispro Injectable (ADMELOG) 3 Unit(s) SubCutaneous three times a day before meals  labetalol 200 milliGRAM(s) Oral two times a day  levoFLOXacin IVPB      levoFLOXacin IVPB 750 milliGRAM(s) IV Intermittent every 24 hours  lidocaine   4% Patch 1 Patch Transdermal every 24 hours  lisinopril 20 milliGRAM(s) Oral daily  NIFEdipine XL 90 milliGRAM(s) Oral daily  polyethylene glycol 3350 17 Gram(s) Oral daily  sodium bicarbonate 650 milliGRAM(s) Oral two times a day  sodium chloride 1.5%. 500 milliLiter(s) (50 mL/Hr) IV Continuous <Continuous>  sodium chloride 2% . 1000 milliLiter(s) (50 mL/Hr) IV Continuous <Continuous>    MEDICATIONS  (PRN):  acetaminophen     Tablet .. 650 milliGRAM(s) Oral every 6 hours PRN Temp greater or equal to 38C (100.4F), Mild Pain (1 - 3), Moderate Pain (4 - 6)  aluminum hydroxide/magnesium hydroxide/simethicone Suspension 30 milliLiter(s) Oral every 4 hours PRN Dyspepsia  dextrose Oral Gel 15 Gram(s) Oral once PRN Blood Glucose LESS THAN 70 milliGRAM(s)/deciliter  oxyCODONE    IR 2.5 milliGRAM(s) Oral every 8 hours PRN Severe Pain (7 - 10)  petrolatum white Ointment 1 Application(s) Topical every 8 hours PRN chapped lips      Care Discussed with Consultants/Other Providers [ ] YES  [ ] NO
Patient is a 79y old  Female who presents with a chief complaint of Fall (31 Aug 2024 09:29)      INTERVAL HPI/OVERNIGHT EVENTS: still c/o some dysuria , urine c/s pos for ecoli ESBL , resistant to levaquin  T(C): 36.7 (24 @ 12:47), Max: 37 (24 @ 17:07)  HR: 76 (24 @ 12:47) (65 - 78)  BP: 134/48 (24 @ 12:47) (125/50 - 154/65)  RR: 18 (24 @ 12:47) (18 - 18)  SpO2: 100% (24 @ 12:47) (99% - 100%)  Wt(kg): --  I&O's Summary    30 Aug 2024 07:01  -  31 Aug 2024 07:00  --------------------------------------------------------  IN: 720 mL / OUT: 1800 mL / NET: -1080 mL    31 Aug 2024 07:  -  31 Aug 2024 14:07  --------------------------------------------------------  IN: 440 mL / OUT: 325 mL / NET: 115 mL        PAST MEDICAL & SURGICAL HISTORY:  Abdominal obesity      Benign essential hypertension      CAD (coronary atherosclerotic disease)  s/p stent      DM type 2 (diabetes mellitus, type 2)      Dyslipidemia      H/O: hysterectomy      S/P  Section      S/P hemorrhoidectomy      History of cholecystectomy          SOCIAL HISTORY  Alcohol:  Tobacco:  Illicit substance use:    FAMILY HISTORY:    REVIEW OF SYSTEMS:  CONSTITUTIONAL: No fever, weight loss, or fatigue  EYES: No eye pain, visual disturbances, or discharge  ENMT:  No difficulty hearing, tinnitus, vertigo; No sinus or throat pain  NECK: No pain or stiffness  RESPIRATORY: No cough, wheezing, chills or hemoptysis; No shortness of breath  CARDIOVASCULAR: No chest pain, palpitations, dizziness, or leg swelling  GASTROINTESTINAL: No abdominal or epigastric pain. No nausea, vomiting, or hematemesis; No diarrhea or constipation. No melena or hematochezia.  GENITOURINARY: No dysuria, frequency, hematuria, or incontinence  NEUROLOGICAL: No headaches, memory loss, loss of strength, numbness, or tremors  SKIN: No itching, burning, rashes, or lesions   LYMPH NODES: No enlarged glands  ENDOCRINE: No heat or cold intolerance; No hair loss  MUSCULOSKELETAL: No joint pain or swelling; No muscle, back, or extremity pain  PSYCHIATRIC: No depression, anxiety, mood swings, or difficulty sleeping  HEME/LYMPH: No easy bruising, or bleeding gums  ALLERY AND IMMUNOLOGIC: No hives or eczema    RADIOLOGY & ADDITIONAL TESTS:    Imaging Personally Reviewed:  [ ] YES  [ ] NO    Consultant(s) Notes Reviewed:  [ ] YES  [ ] NO    PHYSICAL EXAM:  GENERAL: NAD, well-groomed, well-developed  HEAD:  Atraumatic, Normocephalic  EYES: EOMI, PERRLA, conjunctiva and sclera clear  ENMT: No tonsillar erythema, exudates, or enlargement; Moist mucous membranes, Good dentition, No lesions  NECK: Supple, No JVD, Normal thyroid  NERVOUS SYSTEM:  Alert & Oriented X3, Good concentration; Motor Strength 5/5 B/L upper and lower extremities; DTRs 2+ intact and symmetric  CHEST/LUNG: Clear to percussion bilaterally; No rales, rhonchi, wheezing, or rubs  HEART: Regular rate and rhythm; No murmurs, rubs, or gallops  ABDOMEN: Soft, Nontender, Nondistended; Bowel sounds present  EXTREMITIES:  2+ Peripheral Pulses, No clubbing, cyanosis, or edema  LYMPH: No lymphadenopathy noted  SKIN: No rashes or lesions    LABS:                        10.0   11.30 )-----------( 334      ( 31 Aug 2024 05:05 )             30.9     08-31    131<L>  |  97<L>  |  21  ----------------------------<  157<H>  4.5   |  23  |  0.99    Ca    9.1      31 Aug 2024 05:05  Phos  3.7     08-  Mg     1.80     08-        Urinalysis Basic - ( 31 Aug 2024 05:05 )    Color: x / Appearance: x / SG: x / pH: x  Gluc: 157 mg/dL / Ketone: x  / Bili: x / Urobili: x   Blood: x / Protein: x / Nitrite: x   Leuk Esterase: x / RBC: x / WBC x   Sq Epi: x / Non Sq Epi: x / Bacteria: x      CAPILLARY BLOOD GLUCOSE      POCT Blood Glucose.: 305 mg/dL (31 Aug 2024 12:43)  POCT Blood Glucose.: 235 mg/dL (31 Aug 2024 08:47)  POCT Blood Glucose.: 126 mg/dL (30 Aug 2024 21:27)  POCT Blood Glucose.: 202 mg/dL (30 Aug 2024 17:53)        Urinalysis Basic - ( 31 Aug 2024 05:05 )    Color: x / Appearance: x / SG: x / pH: x  Gluc: 157 mg/dL / Ketone: x  / Bili: x / Urobili: x   Blood: x / Protein: x / Nitrite: x   Leuk Esterase: x / RBC: x / WBC x   Sq Epi: x / Non Sq Epi: x / Bacteria: x        MEDICATIONS  (STANDING):  aspirin  chewable 81 milliGRAM(s) Oral daily  atorvastatin 40 milliGRAM(s) Oral at bedtime  chlorhexidine 2% Cloths 1 Application(s) Topical daily  dextrose 5%. 1000 milliLiter(s) (100 mL/Hr) IV Continuous <Continuous>  dextrose 5%. 1000 milliLiter(s) (50 mL/Hr) IV Continuous <Continuous>  dextrose 50% Injectable 25 Gram(s) IV Push once  dextrose 50% Injectable 12.5 Gram(s) IV Push once  dextrose 50% Injectable 25 Gram(s) IV Push once  gabapentin 100 milliGRAM(s) Oral daily  glucagon  Injectable 1 milliGRAM(s) IntraMuscular once  insulin glargine Injectable (LANTUS) 12 Unit(s) SubCutaneous at bedtime  insulin lispro (ADMELOG) corrective regimen sliding scale   SubCutaneous three times a day before meals  insulin lispro (ADMELOG) corrective regimen sliding scale   SubCutaneous at bedtime  insulin lispro Injectable (ADMELOG) 3 Unit(s) SubCutaneous three times a day before meals  labetalol 200 milliGRAM(s) Oral two times a day  lidocaine   4% Patch 1 Patch Transdermal every 24 hours  lisinopril 20 milliGRAM(s) Oral daily  NIFEdipine XL 90 milliGRAM(s) Oral daily  polyethylene glycol 3350 17 Gram(s) Oral daily  sodium bicarbonate 650 milliGRAM(s) Oral two times a day  sodium chloride 1.5%. 500 milliLiter(s) (50 mL/Hr) IV Continuous <Continuous>  sodium chloride 2% . 1000 milliLiter(s) (50 mL/Hr) IV Continuous <Continuous>    MEDICATIONS  (PRN):  acetaminophen     Tablet .. 650 milliGRAM(s) Oral every 6 hours PRN Temp greater or equal to 38C (100.4F), Mild Pain (1 - 3), Moderate Pain (4 - 6)  aluminum hydroxide/magnesium hydroxide/simethicone Suspension 30 milliLiter(s) Oral every 4 hours PRN Dyspepsia  dextrose Oral Gel 15 Gram(s) Oral once PRN Blood Glucose LESS THAN 70 milliGRAM(s)/deciliter  oxyCODONE    IR 2.5 milliGRAM(s) Oral every 8 hours PRN Severe Pain (7 - 10)  petrolatum white Ointment 1 Application(s) Topical every 8 hours PRN chapped lips      Care Discussed with Consultants/Other Providers [ ] YES  [ ] NO
    Subjective: Patient seen and examined. No new events except as noted.     SUBJECTIVE/ROS:        MEDICATIONS:  MEDICATIONS  (STANDING):  aspirin  chewable 81 milliGRAM(s) Oral daily  atorvastatin 40 milliGRAM(s) Oral at bedtime  chlorhexidine 2% Cloths 1 Application(s) Topical daily  dextrose 5%. 1000 milliLiter(s) (50 mL/Hr) IV Continuous <Continuous>  dextrose 5%. 1000 milliLiter(s) (100 mL/Hr) IV Continuous <Continuous>  dextrose 50% Injectable 25 Gram(s) IV Push once  dextrose 50% Injectable 25 Gram(s) IV Push once  dextrose 50% Injectable 12.5 Gram(s) IV Push once  gabapentin 100 milliGRAM(s) Oral daily  glucagon  Injectable 1 milliGRAM(s) IntraMuscular once  insulin glargine Injectable (LANTUS) 8 Unit(s) SubCutaneous at bedtime  insulin lispro (ADMELOG) corrective regimen sliding scale   SubCutaneous three times a day before meals  insulin lispro (ADMELOG) corrective regimen sliding scale   SubCutaneous at bedtime  insulin lispro Injectable (ADMELOG) 3 Unit(s) SubCutaneous three times a day before meals  labetalol 200 milliGRAM(s) Oral two times a day  lidocaine   4% Patch 1 Patch Transdermal every 24 hours  lisinopril 20 milliGRAM(s) Oral daily  mupirocin 2% Ointment 1 Application(s) Topical two times a day  NIFEdipine XL 90 milliGRAM(s) Oral daily  polyethylene glycol 3350 17 Gram(s) Oral daily  sodium chloride 1 Gram(s) Oral three times a day      PHYSICAL EXAM:  T(C): 36.4 (08-27-24 @ 06:19), Max: 36.9 (08-26-24 @ 21:03)  HR: 90 (08-27-24 @ 06:19) (70 - 103)  BP: 158/79 (08-27-24 @ 06:19) (142/54 - 190/79)  RR: 18 (08-27-24 @ 06:19) (18 - 18)  SpO2: 100% (08-27-24 @ 06:19) (99% - 100%)  Wt(kg): --  I&O's Summary    26 Aug 2024 07:01  -  27 Aug 2024 07:00  --------------------------------------------------------  IN: 1310 mL / OUT: 1450 mL / NET: -140 mL            JVP: Normal  Neck: supple  Lung: clear   CV: S1 S2 , Murmur:  Abd: soft  Ext: No edema  neuro: Awake / alert  Psych: flat affect  Skin: normal``    LABS/DATA:    CARDIAC MARKERS:                                11.5   17.96 )-----------( 347      ( 27 Aug 2024 03:05 )             34.2     08-27    128<L>  |  93<L>  |  39<H>  ----------------------------<  250<H>  4.6   |  22  |  0.95    Ca    8.7      27 Aug 2024 03:05  Phos  2.6     08-27  Mg     2.00     08-27      proBNP:   Lipid Profile:   HgA1c:   TSH:     TELE:  EKG:        
    Subjective: Patient seen and examined. No new events except as noted.     SUBJECTIVE/ROS:  nad      MEDICATIONS:  MEDICATIONS  (STANDING):  amLODIPine   Tablet 5 milliGRAM(s) Oral daily  aspirin  chewable 81 milliGRAM(s) Oral daily  atorvastatin 40 milliGRAM(s) Oral at bedtime  chlorhexidine 2% Cloths 1 Application(s) Topical daily  dextrose 5%. 1000 milliLiter(s) (50 mL/Hr) IV Continuous <Continuous>  dextrose 5%. 1000 milliLiter(s) (100 mL/Hr) IV Continuous <Continuous>  dextrose 50% Injectable 25 Gram(s) IV Push once  dextrose 50% Injectable 12.5 Gram(s) IV Push once  dextrose 50% Injectable 25 Gram(s) IV Push once  gabapentin 100 milliGRAM(s) Oral daily  glucagon  Injectable 1 milliGRAM(s) IntraMuscular once  insulin lispro (ADMELOG) corrective regimen sliding scale   SubCutaneous three times a day before meals  insulin lispro (ADMELOG) corrective regimen sliding scale   SubCutaneous at bedtime  metoprolol succinate  milliGRAM(s) Oral daily      PHYSICAL EXAM:  T(C): 36.4 (08-23-24 @ 06:15), Max: 36.5 (08-22-24 @ 10:47)  HR: 75 (08-23-24 @ 06:15) (68 - 75)  BP: 167/62 (08-23-24 @ 06:15) (144/77 - 167/62)  RR: 16 (08-23-24 @ 06:15) (16 - 18)  SpO2: 100% (08-23-24 @ 06:15) (97% - 100%)  Wt(kg): --  I&O's Summary          JVP: Normal  Neck: supple  Lung: clear   CV: S1 S2 , Murmur:  Abd: soft  Ext: No edema  neuro: Awake   Psych: flat affect  Skin: normal``    LABS/DATA:    CARDIAC MARKERS:                                11.7   10.82 )-----------( 373      ( 22 Aug 2024 05:10 )             34.7     08-22    131<L>  |  94<L>  |  20  ----------------------------<  177<H>  4.2   |  22  |  0.88    Ca    9.6      22 Aug 2024 05:10  Phos  3.9     08-22  Mg     1.50     08-22      proBNP:   Lipid Profile:   HgA1c:   TSH:     TELE:  EKG:        
  Dr. Glenna Rueda     Office [phone] (9 am to 5 pm)  Service: [phone] (5pm to 9am)        RENAL PROGRESS NOTE: DATE OF SERVICE 08-24-24 @ 12:09    Patient is a 79y old  Female who presents with a chief complaint of Fall (24 Aug 2024 07:49)      Patient seen and examined at bedside. No chest pain/sob    VITALS:  T(F): 97.5 (08-24-24 @ 10:06), Max: 98 (08-23-24 @ 21:30)  HR: 62 (08-24-24 @ 10:06)  BP: 147/66 (08-24-24 @ 10:06)  RR: 17 (08-24-24 @ 10:06)  SpO2: 98% (08-24-24 @ 10:06)  Wt(kg): --    08-23 @ 07:01  -  08-24 @ 07:00  --------------------------------------------------------  IN: 580 mL / OUT: 1000 mL / NET: -420 mL          PHYSICAL EXAM:  Constitutional: NAD  Neck: No JVD  Respiratory: CTAB, no wheezes, rales or rhonchi  Cardiovascular: S1, S2, RRR  Gastrointestinal: BS+, soft, NT/ND  Extremities: No peripheral edema    Hospital Medications:   MEDICATIONS  (STANDING):  aspirin  chewable 81 milliGRAM(s) Oral daily  atorvastatin 40 milliGRAM(s) Oral at bedtime  chlorhexidine 2% Cloths 1 Application(s) Topical daily  dextrose 5%. 1000 milliLiter(s) (50 mL/Hr) IV Continuous <Continuous>  dextrose 5%. 1000 milliLiter(s) (100 mL/Hr) IV Continuous <Continuous>  dextrose 50% Injectable 25 Gram(s) IV Push once  dextrose 50% Injectable 25 Gram(s) IV Push once  dextrose 50% Injectable 12.5 Gram(s) IV Push once  gabapentin 100 milliGRAM(s) Oral daily  glucagon  Injectable 1 milliGRAM(s) IntraMuscular once  insulin lispro (ADMELOG) corrective regimen sliding scale   SubCutaneous three times a day before meals  insulin lispro (ADMELOG) corrective regimen sliding scale   SubCutaneous at bedtime  metoprolol succinate  milliGRAM(s) Oral daily  mupirocin 2% Ointment 1 Application(s) Topical two times a day  NIFEdipine XL 90 milliGRAM(s) Oral daily      LABS:  08-24    128<L>  |  93<L>  |  18  ----------------------------<  180<H>  4.2   |  18<L>  |  0.67    Ca    9.5      24 Aug 2024 06:00  Phos  3.6     08-24  Mg     1.60     08-24      Creatinine Trend: 0.67 <--, 0.88 <--, 0.80 <--, 0.60 <--, 0.90 <--    Phosphorus: 3.6 mg/dL (08-24 @ 06:00)                              13.3   11.28 )-----------( 320      ( 24 Aug 2024 06:00 )             40.7     Urine Studies:  Urinalysis - [08-24-24 @ 06:00]      Color  / Appearance  / SG  / pH       Gluc 180 / Ketone   / Bili  / Urobili        Blood  / Protein  / Leuk Est  / Nitrite       RBC  / WBC  / Hyaline  / Gran  / Sq Epi  / Non Sq Epi  / Bacteria     Urine Sodium 36      [08-21-24 @ 11:20]  Urine Osmolality 496      [08-21-24 @ 11:20]    TSH 1.12      [08-21-24 @ 06:01]  Lipid: chol 139, TG 72, HDL 46, LDL --      [08-22-24 @ 05:10]        RADIOLOGY & ADDITIONAL STUDIES:  
[facility] [place] [facility]   MD MAURIZIO CLARK MD ANGELA WONG, PA    TEL:  OFFICE: [phone]  From 5pm-7am Answering Service [phone]    -- RENAL FOLLOW UP NOTE ---Date of Service 08-27-24 @ 10:09    Patient is a 79y old  Female who presents with a chief complaint of Fall (27 Aug 2024 08:39)      Patient seen and examined at bedside. No chest pain/sob, c/o need to push abd to urinate     VITALS:  T(F): 97.5 (08-27-24 @ 06:19), Max: 98.5 (08-26-24 @ 21:03)  HR: 90 (08-27-24 @ 06:19)  BP: 158/79 (08-27-24 @ 06:19)  RR: 18 (08-27-24 @ 06:19)  SpO2: 100% (08-27-24 @ 06:19)  Wt(kg): --    08-26 @ 07:01  -  08-27 @ 07:00  --------------------------------------------------------  IN: 1310 mL / OUT: 1450 mL / NET: -140 mL          PHYSICAL EXAM:  General: NAD  Neck: No JVD  Respiratory: CTAB, no wheezes, rales or rhonchi  Cardiovascular: S1, S2, RRR  Gastrointestinal: BS+, soft, suprapubic tenderness  Extremities: No peripheral edema    Hospital Medications:   MEDICATIONS  (STANDING):  aspirin  chewable 81 milliGRAM(s) Oral daily  atorvastatin 40 milliGRAM(s) Oral at bedtime  chlorhexidine 2% Cloths 1 Application(s) Topical daily  dextrose 5%. 1000 milliLiter(s) (50 mL/Hr) IV Continuous <Continuous>  dextrose 5%. 1000 milliLiter(s) (100 mL/Hr) IV Continuous <Continuous>  dextrose 50% Injectable 12.5 Gram(s) IV Push once  dextrose 50% Injectable 25 Gram(s) IV Push once  dextrose 50% Injectable 25 Gram(s) IV Push once  gabapentin 100 milliGRAM(s) Oral daily  glucagon  Injectable 1 milliGRAM(s) IntraMuscular once  insulin glargine Injectable (LANTUS) 8 Unit(s) SubCutaneous at bedtime  insulin lispro (ADMELOG) corrective regimen sliding scale   SubCutaneous at bedtime  insulin lispro (ADMELOG) corrective regimen sliding scale   SubCutaneous three times a day before meals  insulin lispro Injectable (ADMELOG) 3 Unit(s) SubCutaneous three times a day before meals  labetalol 200 milliGRAM(s) Oral two times a day  lidocaine   4% Patch 1 Patch Transdermal every 24 hours  lisinopril 20 milliGRAM(s) Oral daily  mupirocin 2% Ointment 1 Application(s) Topical two times a day  NIFEdipine XL 90 milliGRAM(s) Oral daily  polyethylene glycol 3350 17 Gram(s) Oral daily  sodium chloride 1 Gram(s) Oral three times a day      LABS:  08-27    128<L>  |  93<L>  |  39<H>  ----------------------------<  250<H>  4.6   |  22  |  0.95    Ca    8.7      27 Aug 2024 03:05  Phos  2.6     08-27  Mg     2.00     08-27      Creatinine Trend: 0.95 <--, 1.02 <--, 1.01 <--, 1.01 <--, 0.67 <--, 0.88 <--, 0.80 <--    Phosphorus: 2.6 mg/dL (08-27 @ 03:05)  Phosphorus: 3.3 mg/dL (08-26 @ 15:54)                              11.5   17.96 )-----------( 347      ( 27 Aug 2024 03:05 )             34.2     Urine Studies:  Urinalysis - [08-27-24 @ 03:05]      Color  / Appearance  / SG  / pH       Gluc 250 / Ketone   / Bili  / Urobili        Blood  / Protein  / Leuk Est  / Nitrite       RBC  / WBC  / Hyaline  / Gran  / Sq Epi  / Non Sq Epi  / Bacteria     Urine Sodium 36      [08-21-24 @ 11:20]  Urine Osmolality 496      [08-21-24 @ 11:20]    TSH 1.12      [08-21-24 @ 06:01]  Lipid: chol 139, TG 72, HDL 46, LDL --      [08-22-24 @ 05:10]        RADIOLOGY & ADDITIONAL STUDIES:  
[facility] [place] [place] [facility]   MD MAURIZIO CLARK MD ANGELA WONG, PA    TEL:  OFFICE: [phone]  From 5pm-7am Answering Service [phone]    -- RENAL FOLLOW UP NOTE ---Date of Service 08-22-24 @ 11:46    Patient is a 79y old  Female who presents with a chief complaint of Fall (22 Aug 2024 11:29)      Patient seen and examined at bedside. No chest pain/sob, c/o left tight pain    VITALS:  T(F): 97.7 (08-22-24 @ 10:47), Max: 98.1 (08-21-24 @ 17:25)  HR: 68 (08-22-24 @ 10:47)  BP: 156/62 (08-22-24 @ 10:47)  RR: 18 (08-22-24 @ 10:47)  SpO2: 97% (08-22-24 @ 10:47)  Wt(kg): --    08-21 @ 07:01  -  08-22 @ 07:00  --------------------------------------------------------  IN: 0 mL / OUT: 500 mL / NET: -500 mL          PHYSICAL EXAM:  General: NAD  Neck: No JVD  Respiratory: CTAB, no wheezes, rales or rhonchi  Cardiovascular: S1, S2, RRR  Gastrointestinal: BS+, soft, NT/ND  Extremities: No peripheral edema    Hospital Medications:   MEDICATIONS  (STANDING):  amLODIPine   Tablet 5 milliGRAM(s) Oral daily  aspirin  chewable 81 milliGRAM(s) Oral daily  atorvastatin 40 milliGRAM(s) Oral at bedtime  chlorhexidine 2% Cloths 1 Application(s) Topical daily  dextrose 5%. 1000 milliLiter(s) (50 mL/Hr) IV Continuous <Continuous>  dextrose 5%. 1000 milliLiter(s) (100 mL/Hr) IV Continuous <Continuous>  dextrose 50% Injectable 25 Gram(s) IV Push once  dextrose 50% Injectable 25 Gram(s) IV Push once  dextrose 50% Injectable 12.5 Gram(s) IV Push once  gabapentin 100 milliGRAM(s) Oral daily  glucagon  Injectable 1 milliGRAM(s) IntraMuscular once  insulin lispro (ADMELOG) corrective regimen sliding scale   SubCutaneous at bedtime  insulin lispro (ADMELOG) corrective regimen sliding scale   SubCutaneous three times a day before meals  metoprolol succinate  milliGRAM(s) Oral daily  sodium bicarbonate 650 milliGRAM(s) Oral three times a day      LABS:  08-22    131<L>  |  94<L>  |  20  ----------------------------<  177<H>  4.2   |  22  |  0.88    Ca    9.6      22 Aug 2024 05:10  Phos  3.9     08-22  Mg     1.50     08-22      Creatinine Trend: 0.88 <--, 0.80 <--, 0.60 <--, 0.90 <--    Phosphorus: 3.9 mg/dL (08-22 @ 05:10)                              11.7   10.82 )-----------( 373      ( 22 Aug 2024 05:10 )             34.7     Urine Studies:  Urinalysis - [08-22-24 @ 05:10]      Color  / Appearance  / SG  / pH       Gluc 177 / Ketone   / Bili  / Urobili        Blood  / Protein  / Leuk Est  / Nitrite       RBC  / WBC  / Hyaline  / Gran  / Sq Epi  / Non Sq Epi  / Bacteria     Urine Sodium 36      [08-21-24 @ 11:20]  Urine Osmolality 496      [08-21-24 @ 11:20]    TSH 1.12      [08-21-24 @ 06:01]        RADIOLOGY & ADDITIONAL STUDIES:  
MOUNA SIRENACATIA  79y  Patient is a 79y old  Female who presents with a chief complaint of Fall (01 Sep 2024 07:49)    HPI:  Followed for Hyponatremia. No new complaints.    HEALTH ISSUES - PROBLEM Dx:  Left hip pain    Fall    Generalized weakness    Type 2 diabetes mellitus    Essential hypertension    History of transient ischemic attack (TIA)    Medication management          MEDICATIONS  (STANDING):  aspirin  chewable 81 milliGRAM(s) Oral daily  atorvastatin 40 milliGRAM(s) Oral at bedtime  chlorhexidine 2% Cloths 1 Application(s) Topical daily  dextrose 5%. 1000 milliLiter(s) (50 mL/Hr) IV Continuous <Continuous>  dextrose 5%. 1000 milliLiter(s) (100 mL/Hr) IV Continuous <Continuous>  dextrose 50% Injectable 25 Gram(s) IV Push once  dextrose 50% Injectable 12.5 Gram(s) IV Push once  dextrose 50% Injectable 25 Gram(s) IV Push once  ertapenem  IVPB 1000 milliGRAM(s) IV Intermittent every 24 hours  gabapentin 100 milliGRAM(s) Oral daily  glucagon  Injectable 1 milliGRAM(s) IntraMuscular once  insulin glargine Injectable (LANTUS) 12 Unit(s) SubCutaneous at bedtime  insulin lispro (ADMELOG) corrective regimen sliding scale   SubCutaneous three times a day before meals  insulin lispro (ADMELOG) corrective regimen sliding scale   SubCutaneous at bedtime  insulin lispro Injectable (ADMELOG) 3 Unit(s) SubCutaneous three times a day before meals  labetalol 200 milliGRAM(s) Oral two times a day  lidocaine   4% Patch 1 Patch Transdermal every 24 hours  lisinopril 20 milliGRAM(s) Oral daily  NIFEdipine XL 90 milliGRAM(s) Oral daily  polyethylene glycol 3350 17 Gram(s) Oral two times a day  senna 2 Tablet(s) Oral at bedtime  sodium bicarbonate 650 milliGRAM(s) Oral two times a day    MEDICATIONS  (PRN):  acetaminophen     Tablet .. 650 milliGRAM(s) Oral every 6 hours PRN Temp greater or equal to 38C (100.4F), Mild Pain (1 - 3), Moderate Pain (4 - 6)  aluminum hydroxide/magnesium hydroxide/simethicone Suspension 30 milliLiter(s) Oral every 4 hours PRN Dyspepsia  dextrose Oral Gel 15 Gram(s) Oral once PRN Blood Glucose LESS THAN 70 milliGRAM(s)/deciliter  oxyCODONE    IR 2.5 milliGRAM(s) Oral every 8 hours PRN Severe Pain (7 - 10)  petrolatum white Ointment 1 Application(s) Topical every 8 hours PRN chapped lips    Vital Signs Last 24 Hrs  T(C): 36.6 (01 Sep 2024 10:29), Max: 37.3 (31 Aug 2024 21:10)  T(F): 97.8 (01 Sep 2024 10:29), Max: 99.1 (31 Aug 2024 21:10)  HR: 72 (01 Sep 2024 10:29) (72 - 92)  BP: 120/75 (01 Sep 2024 10:29) (120/75 - 153/62)  BP(mean): --  RR: 18 (01 Sep 2024 10:29) (18 - 18)  SpO2: 100% (01 Sep 2024 10:29) (100% - 100%)    Parameters below as of 01 Sep 2024 10:29  Patient On (Oxygen Delivery Method): room air      Daily     Daily     PHYSICAL EXAM:  Constitutional:  She appears comfortable and not distressed. Not diaphoretic.    Neck:  The thyroid is normal. Trachea is midline.     Breasts: Normal examination.    Respiratory: The lungs are clear to auscultation. No dullness and expansion is normal.    Cardiovascular: S1 and S2 are normal. No mummurs, rubs or gallops are present.    Gastrointestinal: The abdomen is soft. No tenderness is present. No masses are present. Bowel sounds are normal.    Genitourinary: The bladder is not distended. No CVA tenderness is present.    Extremities: No edema is noted. No deformities are present.    Neurological: Cognition is normal. Tone, power and sensation are normal. Gait is steady.    Skin: No leasions are seen  or palpated.    Lymph Nodes: No lymphadenopathy is present.    Psychiatric: Mood is appropriate. No hallucinations or flight of ideas are noted.                              10.9   14.91 )-----------( 340      ( 01 Sep 2024 03:50 )             32.7     09-01    132<L>  |  96<L>  |  16  ----------------------------<  165<H>  4.4   |  20<L>  |  0.91    Ca    9.1      01 Sep 2024 03:50  Phos  3.4     09-01  Mg     1.70     09-01  
Neurology Progress Note    S: Patient seen and examined.    Medications: MEDICATIONS  (STANDING):  aspirin  chewable 81 milliGRAM(s) Oral daily  atorvastatin 40 milliGRAM(s) Oral at bedtime  chlorhexidine 2% Cloths 1 Application(s) Topical daily  dextrose 5%. 1000 milliLiter(s) (50 mL/Hr) IV Continuous <Continuous>  dextrose 5%. 1000 milliLiter(s) (100 mL/Hr) IV Continuous <Continuous>  dextrose 50% Injectable 25 Gram(s) IV Push once  dextrose 50% Injectable 25 Gram(s) IV Push once  dextrose 50% Injectable 12.5 Gram(s) IV Push once  gabapentin 100 milliGRAM(s) Oral daily  glucagon  Injectable 1 milliGRAM(s) IntraMuscular once  insulin glargine Injectable (LANTUS) 12 Unit(s) SubCutaneous at bedtime  insulin lispro (ADMELOG) corrective regimen sliding scale   SubCutaneous three times a day before meals  insulin lispro (ADMELOG) corrective regimen sliding scale   SubCutaneous at bedtime  insulin lispro Injectable (ADMELOG) 3 Unit(s) SubCutaneous three times a day before meals  labetalol 200 milliGRAM(s) Oral two times a day  levoFLOXacin IVPB      levoFLOXacin IVPB 750 milliGRAM(s) IV Intermittent every 24 hours  lidocaine   4% Patch 1 Patch Transdermal every 24 hours  lisinopril 20 milliGRAM(s) Oral daily  NIFEdipine XL 90 milliGRAM(s) Oral daily  polyethylene glycol 3350 17 Gram(s) Oral daily  sodium bicarbonate 650 milliGRAM(s) Oral two times a day    MEDICATIONS  (PRN):  acetaminophen     Tablet .. 650 milliGRAM(s) Oral every 6 hours PRN Temp greater or equal to 38C (100.4F), Mild Pain (1 - 3), Moderate Pain (4 - 6)  aluminum hydroxide/magnesium hydroxide/simethicone Suspension 30 milliLiter(s) Oral every 4 hours PRN Dyspepsia  dextrose Oral Gel 15 Gram(s) Oral once PRN Blood Glucose LESS THAN 70 milliGRAM(s)/deciliter  oxyCODONE    IR 2.5 milliGRAM(s) Oral every 8 hours PRN Severe Pain (7 - 10)  petrolatum white Ointment 1 Application(s) Topical every 8 hours PRN chapped lips       Vitals:  Vital Signs Last 24 Hrs  T(C): 37 (28 Aug 2024 10:05), Max: 37.2 (27 Aug 2024 17:15)  T(F): 98.6 (28 Aug 2024 10:05), Max: 98.9 (27 Aug 2024 17:15)  HR: 75 (28 Aug 2024 10:05) (61 - 75)  BP: 165/66 (28 Aug 2024 10:05) (97/50 - 165/66)  BP(mean): --  RR: 18 (28 Aug 2024 10:05) (18 - 18)  SpO2: 100% (28 Aug 2024 10:05) (100% - 100%)    Parameters below as of 28 Aug 2024 10:05  Patient On (Oxygen Delivery Method): room air              General Exam:   General Appearance: Appropriately dressed and in no acute distress       Head: Normocephalic, atraumatic and no dysmorphic features  Ear, Nose, and Throat: Moist mucous membranes  CVS: S1S2+  Resp: No SOB, no wheeze or rhonchi  Abd: soft NTND  Extremities: No edema, no cyanosis  Skin: No bruises, no rashes     Neurological Exam:  Mental Status: Awake, alert and oriented x 3.  Able to follow simple and complex verbal commands. Able to name and repeat. fluent speech. No obvious aphasia or dysarthria noted.   Cranial Nerves: PERRL, EOMI, VFFC, sensation V1-V3 intact,  L NLF,  equal elevation of palate, scm/trap 5/5, tongue is midline on protrusion.  hearing is grossly intact.   Motor: LUE and LLE drift   Sensation: dec on the L    Reflexes: 1+ throughout at biceps, brachioradialis, triceps, patellars and ankles bilaterally and equal. No clonus. R toe and L toe were both downgoing.  Coordination: No dysmetria on FNF  Gait: deferred      I personally reviewed the below data/images/labs:      LABS:                          10.8   15.84 )-----------( 340      ( 28 Aug 2024 05:16 )             32.0     08-28    124<L>  |  90<L>  |  38<H>  ----------------------------<  251<H>  4.6   |  19<L>  |  1.03    Ca    8.6      28 Aug 2024 05:16  Phos  3.5     08-28  Mg     1.80     08-28          Urinalysis Basic - ( 28 Aug 2024 05:16 )    Color: x / Appearance: x / SG: x / pH: x  Gluc: 251 mg/dL / Ketone: x  / Bili: x / Urobili: x   Blood: x / Protein: x / Nitrite: x   Leuk Esterase: x / RBC: x / WBC x   Sq Epi: x / Non Sq Epi: x / Bacteria: x        < from: CT Brain Stroke Protocol (06.09.24 @ 14:51) >    ACC: [id number] EXAM:  CT BRAIN STROKE PROTOCOL   ORDERED BY: SARA SNIDER     PROCEDURE DATE:  06/09/2024         INTERPRETATION:  CLINICAL INFORMATION: Code stroke, 10 min episode of   aphasia that's returned to baseline, last known normal at 1030am.  NIHHS   0    TECHNIQUE: Noncontrast axial CT images were acquired through the head.   Two-dimensional sagittal and coronal reformats were generated.    COMPARISON STUDY: MR brain 1/27/2015, CT brain 5/30/2024    FINDINGS:    No acute intracranial hemorrhage, mass effect, vasogenic edema, or   evidence of acute territorial infarct.    Ventricle size is proportionate for age.  Mild prominent gyri and foci   particularly of the left frontotemporal region minimally changed from   5/30/2024. There is mild patchy white matter hypoattenuation which is   nonspecific in etiology but likely related to chronic microvascular   changes.    The visualized paranasal sinuses are clear. The mastoid air cells and   middle ear cavities are clear.    The globes are unremarkable.    The soft tissues of the scalp are unremarkable. The calvarium is intact.    IMPRESSION:    No CT evidence of acute intracranial hemorrhage, brain edema, or mass   effect. Consider MRI as clinically warranted.    Findings discussed with Dr. Rodriguez from neurology at 2:43 pm on 6/9/2024   by YESSY Veloz MD.    < end of copied text >  < from: MRI Head w/wo Cont (01.27.15 @ 19:11) >    IMPRESSION: Evidence of intraventricular hemorrhage layering in the   occipital horns of the lateral ventricles without hydrocephalus. Some   residual subarachnoid hemorrhage in the prepontine and suprasellar   regions as well as some trace what appears to be subarachnoid hemorrhage   in the high left temporal parietal region on the susceptibility and   gradient echo sequences. No definite visualization of a fistula.    < end of copied text >      CONCLUSIONS:      1. Left ventricular systolic function is hyperdynamic with an ejection fraction visually estimated at >75 %.   2. Normal right ventricular cavity size, with normal wall thickness, and normal right ventricular systolic function. Tricuspid annular plane systolic excursion (TAPSE) is 2.1 cm (normal >=1.7 cm).   3. Normal left and right atrial size.   4. No significant valvular disease.   5. No pericardial effusion seen.   6. Hyperdynamic left ventricular systolic function with intra-cavitary gradient of 39 mmHg.   7. The inferior vena cava is normal in size (normal <2.1cm) with normal inspiratory collapse (normal >50%) consistent with normal right atrial pressure (~3, range 0-5mmHg).      
Neurology Progress Note    S: Patient seen and examined. Feeling better this AM    Medications: MEDICATIONS  (STANDING):  aspirin  chewable 81 milliGRAM(s) Oral daily  atorvastatin 40 milliGRAM(s) Oral at bedtime  chlorhexidine 2% Cloths 1 Application(s) Topical daily  dextrose 5%. 1000 milliLiter(s) (50 mL/Hr) IV Continuous <Continuous>  dextrose 5%. 1000 milliLiter(s) (100 mL/Hr) IV Continuous <Continuous>  dextrose 50% Injectable 25 Gram(s) IV Push once  dextrose 50% Injectable 25 Gram(s) IV Push once  dextrose 50% Injectable 12.5 Gram(s) IV Push once  gabapentin 100 milliGRAM(s) Oral daily  glucagon  Injectable 1 milliGRAM(s) IntraMuscular once  insulin glargine Injectable (LANTUS) 12 Unit(s) SubCutaneous at bedtime  insulin lispro (ADMELOG) corrective regimen sliding scale   SubCutaneous three times a day before meals  insulin lispro (ADMELOG) corrective regimen sliding scale   SubCutaneous at bedtime  insulin lispro Injectable (ADMELOG) 3 Unit(s) SubCutaneous three times a day before meals  labetalol 200 milliGRAM(s) Oral two times a day  levoFLOXacin IVPB      levoFLOXacin IVPB 750 milliGRAM(s) IV Intermittent every 24 hours  lidocaine   4% Patch 1 Patch Transdermal every 24 hours  lisinopril 20 milliGRAM(s) Oral daily  NIFEdipine XL 90 milliGRAM(s) Oral daily  polyethylene glycol 3350 17 Gram(s) Oral daily  sodium bicarbonate 650 milliGRAM(s) Oral two times a day  sodium chloride 1.5%. 500 milliLiter(s) (50 mL/Hr) IV Continuous <Continuous>  sodium chloride 2% . 1000 milliLiter(s) (50 mL/Hr) IV Continuous <Continuous>    MEDICATIONS  (PRN):  acetaminophen     Tablet .. 650 milliGRAM(s) Oral every 6 hours PRN Temp greater or equal to 38C (100.4F), Mild Pain (1 - 3), Moderate Pain (4 - 6)  aluminum hydroxide/magnesium hydroxide/simethicone Suspension 30 milliLiter(s) Oral every 4 hours PRN Dyspepsia  dextrose Oral Gel 15 Gram(s) Oral once PRN Blood Glucose LESS THAN 70 milliGRAM(s)/deciliter  oxyCODONE    IR 2.5 milliGRAM(s) Oral every 8 hours PRN Severe Pain (7 - 10)  petrolatum white Ointment 1 Application(s) Topical every 8 hours PRN chapped lips       Vitals:  Vital Signs Last 24 Hrs  T(C): 36.9 (29 Aug 2024 16:21), Max: 37 (28 Aug 2024 21:14)  T(F): 98.4 (29 Aug 2024 16:21), Max: 98.6 (28 Aug 2024 21:14)  HR: 73 (29 Aug 2024 16:21) (73 - 79)  BP: 136/44 (29 Aug 2024 16:21) (113/40 - 136/44)  BP(mean): --  RR: 18 (29 Aug 2024 16:21) (18 - 18)  SpO2: 100% (29 Aug 2024 16:21) (97% - 100%)    Parameters below as of 29 Aug 2024 16:21  Patient On (Oxygen Delivery Method): room air                General Exam:   General Appearance: Appropriately dressed and in no acute distress       Head: Normocephalic, atraumatic and no dysmorphic features  Ear, Nose, and Throat: Moist mucous membranes  CVS: S1S2+  Resp: No SOB, no wheeze or rhonchi  Abd: soft NTND  Extremities: No edema, no cyanosis  Skin: No bruises, no rashes     Neurological Exam:  Mental Status: Awake, alert and oriented x 3.  Able to follow simple and complex verbal commands. Able to name and repeat. fluent speech. No obvious aphasia or dysarthria noted.   Cranial Nerves: PERRL, EOMI, VFFC, sensation V1-V3 intact,  L NLF,  equal elevation of palate, scm/trap 5/5, tongue is midline on protrusion.  hearing is grossly intact.   Motor: LUE and LLE drift   Sensation: dec on the L    Reflexes: 1+ throughout at biceps, brachioradialis, triceps, patellars and ankles bilaterally and equal. No clonus. R toe and L toe were both downgoing.  Coordination: No dysmetria on FNF  Gait: deferred      I personally reviewed the below data/images/labs:    LABS:                          10.4   12.67 )-----------( 332      ( 29 Aug 2024 06:30 )             31.3     08-29    128<L>  |  95<L>  |  32<H>  ----------------------------<  273<H>  5.2   |  22  |  1.20    Ca    9.0      29 Aug 2024 17:00  Phos  3.5     08-29  Mg     1.80     08-29          Urinalysis Basic - ( 29 Aug 2024 17:00 )    Color: x / Appearance: x / SG: x / pH: x  Gluc: 273 mg/dL / Ketone: x  / Bili: x / Urobili: x   Blood: x / Protein: x / Nitrite: x   Leuk Esterase: x / RBC: x / WBC x   Sq Epi: x / Non Sq Epi: x / Bacteria: x            < from: CT Brain Stroke Protocol (06.09.24 @ 14:51) >    ACC: [id number] EXAM:  CT BRAIN STROKE PROTOCOL   ORDERED BY: SARA SNIDER     PROCEDURE DATE:  06/09/2024         INTERPRETATION:  CLINICAL INFORMATION: Code stroke, 10 min episode of   aphasia that's returned to baseline, last known normal at 1030am.  NIH   0    TECHNIQUE: Noncontrast axial CT images were acquired through the head.   Two-dimensional sagittal and coronal reformats were generated.    COMPARISON STUDY: MR brain 1/27/2015, CT brain 5/30/2024    FINDINGS:    No acute intracranial hemorrhage, mass effect, vasogenic edema, or   evidence of acute territorial infarct.    Ventricle size is proportionate for age.  Mild prominent gyri and foci   particularly of the left frontotemporal region minimally changed from   5/30/2024. There is mild patchy white matter hypoattenuation which is   nonspecific in etiology but likely related to chronic microvascular   changes.    The visualized paranasal sinuses are clear. The mastoid air cells and   middle ear cavities are clear.    The globes are unremarkable.    The soft tissues of the scalp are unremarkable. The calvarium is intact.    IMPRESSION:    No CT evidence of acute intracranial hemorrhage, brain edema, or mass   effect. Consider MRI as clinically warranted.    Findings discussed with Dr. Rodriguez from neurology at 2:43 pm on 6/9/2024   by YESSY Veloz MD.    < end of copied text >  < from: MRI Head w/wo Cont (01.27.15 @ 19:11) >    IMPRESSION: Evidence of intraventricular hemorrhage layering in the   occipital horns of the lateral ventricles without hydrocephalus. Some   residual subarachnoid hemorrhage in the prepontine and suprasellar   regions as well as some trace what appears to be subarachnoid hemorrhage   in the high left temporal parietal region on the susceptibility and   gradient echo sequences. No definite visualization of a fistula.    < end of copied text >      CONCLUSIONS:      1. Left ventricular systolic function is hyperdynamic with an ejection fraction visually estimated at >75 %.   2. Normal right ventricular cavity size, with normal wall thickness, and normal right ventricular systolic function. Tricuspid annular plane systolic excursion (TAPSE) is 2.1 cm (normal >=1.7 cm).   3. Normal left and right atrial size.   4. No significant valvular disease.   5. No pericardial effusion seen.   6. Hyperdynamic left ventricular systolic function with intra-cavitary gradient of 39 mmHg.   7. The inferior vena cava is normal in size (normal <2.1cm) with normal inspiratory collapse (normal >50%) consistent with normal right atrial pressure (~3, range 0-5mmHg).      
Neurology Progress Note    S: Patient seen and examined. No new events overnight.     Medications: MEDICATIONS  (STANDING):  amLODIPine   Tablet 10 milliGRAM(s) Oral daily  aspirin  chewable 81 milliGRAM(s) Oral daily  atorvastatin 40 milliGRAM(s) Oral at bedtime  chlorhexidine 2% Cloths 1 Application(s) Topical daily  dextrose 5%. 1000 milliLiter(s) (50 mL/Hr) IV Continuous <Continuous>  dextrose 5%. 1000 milliLiter(s) (100 mL/Hr) IV Continuous <Continuous>  dextrose 50% Injectable 25 Gram(s) IV Push once  dextrose 50% Injectable 12.5 Gram(s) IV Push once  dextrose 50% Injectable 25 Gram(s) IV Push once  gabapentin 100 milliGRAM(s) Oral daily  glucagon  Injectable 1 milliGRAM(s) IntraMuscular once  insulin lispro (ADMELOG) corrective regimen sliding scale   SubCutaneous three times a day before meals  insulin lispro (ADMELOG) corrective regimen sliding scale   SubCutaneous at bedtime  metoprolol succinate  milliGRAM(s) Oral daily    MEDICATIONS  (PRN):  acetaminophen     Tablet .. 650 milliGRAM(s) Oral every 6 hours PRN Temp greater or equal to 38C (100.4F), Mild Pain (1 - 3), Moderate Pain (4 - 6), Severe Pain (7 - 10)  dextrose Oral Gel 15 Gram(s) Oral once PRN Blood Glucose LESS THAN 70 milliGRAM(s)/deciliter       Vitals:  Vital Signs Last 24 Hrs  T(C): 36.1 (23 Aug 2024 10:20), Max: 36.5 (22 Aug 2024 22:47)  T(F): 97 (23 Aug 2024 10:20), Max: 97.7 (22 Aug 2024 22:47)  HR: 61 (23 Aug 2024 10:20) (61 - 75)  BP: 153/51 (23 Aug 2024 10:20) (144/77 - 167/62)  BP(mean): --  RR: 18 (23 Aug 2024 10:20) (16 - 18)  SpO2: 99% (23 Aug 2024 10:20) (98% - 100%)    Parameters below as of 23 Aug 2024 06:15  Patient On (Oxygen Delivery Method): room air            General Exam:   General Appearance: Appropriately dressed and in no acute distress       Head: Normocephalic, atraumatic and no dysmorphic features  Ear, Nose, and Throat: Moist mucous membranes  CVS: S1S2+  Resp: No SOB, no wheeze or rhonchi  Abd: soft NTND  Extremities: No edema, no cyanosis  Skin: No bruises, no rashes     Neurological Exam:  Mental Status: Awake, alert and oriented x 3.  Able to follow simple and complex verbal commands. Able to name and repeat. fluent speech. No obvious aphasia or dysarthria noted.   Cranial Nerves: PERRL, EOMI, VFFC, sensation V1-V3 intact,  L NLF,  equal elevation of palate, scm/trap 5/5, tongue is midline on protrusion.  hearing is grossly intact.   Motor: LUE and LLE drift   Sensation: dec on the L    Reflexes: 1+ throughout at biceps, brachioradialis, triceps, patellars and ankles bilaterally and equal. No clonus. R toe and L toe were both downgoing.  Coordination: No dysmetria on FNF  Gait: deferred      I personally reviewed the below data/images/labs:    LABS:                          11.7   10.82 )-----------( 373      ( 22 Aug 2024 05:10 )             34.7     08-22    131<L>  |  94<L>  |  20  ----------------------------<  177<H>  4.2   |  22  |  0.88    Ca    9.6      22 Aug 2024 05:10  Phos  3.9     08-22  Mg     1.50     08-22          Urinalysis Basic - ( 22 Aug 2024 05:10 )    Color: x / Appearance: x / SG: x / pH: x  Gluc: 177 mg/dL / Ketone: x  / Bili: x / Urobili: x   Blood: x / Protein: x / Nitrite: x   Leuk Esterase: x / RBC: x / WBC x   Sq Epi: x / Non Sq Epi: x / Bacteria: x          < from: CT Brain Stroke Protocol (06.09.24 @ 14:51) >    ACC: [id number] EXAM:  CT BRAIN STROKE PROTOCOL   ORDERED BY: SARA SNIDER     PROCEDURE DATE:  06/09/2024          INTERPRETATION:  CLINICAL INFORMATION: Code stroke, 10 min episode of   aphasia that's returned to baseline, last known normal at 1030am.  NIHHS   0    TECHNIQUE: Noncontrast axial CT images were acquired through the head.   Two-dimensional sagittal and coronal reformats were generated.    COMPARISON STUDY: MR brain 1/27/2015, CT brain 5/30/2024    FINDINGS:    No acute intracranial hemorrhage, mass effect, vasogenic edema, or   evidence of acute territorial infarct.    Ventricle size is proportionate for age.  Mild prominent gyri and foci   particularly of the left frontotemporal region minimally changed from   5/30/2024. There is mild patchy white matter hypoattenuation which is   nonspecific in etiology but likely related to chronic microvascular   changes.    The visualized paranasal sinuses are clear. The mastoid air cells and   middle ear cavities are clear.    The globes are unremarkable.    The soft tissues of the scalp are unremarkable. The calvarium is intact.    IMPRESSION:    No CT evidence of acute intracranial hemorrhage, brain edema, or mass   effect. Consider MRI as clinically warranted.    Findings discussed with Dr. Rodriguez from neurology at 2:43 pm on 6/9/2024   by YESSY Veloz MD.    < end of copied text >  < from: MRI Head w/wo Cont (01.27.15 @ 19:11) >    IMPRESSION: Evidence of intraventricular hemorrhage layering in the   occipital horns of the lateral ventricles without hydrocephalus. Some   residual subarachnoid hemorrhage in the prepontine and suprasellar   regions as well as some trace what appears to be subarachnoid hemorrhage   in the high left temporal parietal region on the susceptibility and   gradient echo sequences. No definite visualization of a fistula.    < end of copied text >      CONCLUSIONS:      1. Left ventricular systolic function is hyperdynamic with an ejection fraction visually estimated at >75 %.   2. Normal right ventricular cavity size, with normal wall thickness, and normal right ventricular systolic function. Tricuspid annular plane systolic excursion (TAPSE) is 2.1 cm (normal >=1.7 cm).   3. Normal left and right atrial size.   4. No significant valvular disease.   5. No pericardial effusion seen.   6. Hyperdynamic left ventricular systolic function with intra-cavitary gradient of 39 mmHg.   7. The inferior vena cava is normal in size (normal <2.1cm) with normal inspiratory collapse (normal >50%) consistent with normal right atrial pressure (~3, range 0-5mmHg).      
[facility]   MD MAURIZIO CLARK MD ANGELA WONG, PA    TEL:  OFFICE: [phone]  From 5pm-7am Answering Service [phone]    -- RENAL FOLLOW UP NOTE ---Date of Service 08-28-24 @ 10:30    Patient is a 79y old  Female who presents with a chief complaint of Fall (28 Aug 2024 07:29)      Patient seen and examined at bedside. No chest pain/sob    VITALS:  T(F): 98.6 (08-28-24 @ 10:05), Max: 98.9 (08-27-24 @ 17:15)  HR: 75 (08-28-24 @ 10:05)  BP: 165/66 (08-28-24 @ 10:05)  RR: 18 (08-28-24 @ 10:05)  SpO2: 100% (08-28-24 @ 10:05)  Wt(kg): --    08-27 @ 07:01  -  08-28 @ 07:00  --------------------------------------------------------  IN: 960 mL / OUT: 760 mL / NET: 200 mL    08-28 @ 07:01  -  08-28 @ 10:30  --------------------------------------------------------  IN: 0 mL / OUT: 350 mL / NET: -350 mL          PHYSICAL EXAM:  General: NAD  Neck: No JVD  Respiratory: CTAB, no wheezes, rales or rhonchi  Cardiovascular: S1, S2, RRR  Gastrointestinal: BS+, soft, NT/ND  Extremities: No peripheral edema    Hospital Medications:   MEDICATIONS  (STANDING):  aspirin  chewable 81 milliGRAM(s) Oral daily  atorvastatin 40 milliGRAM(s) Oral at bedtime  chlorhexidine 2% Cloths 1 Application(s) Topical daily  dextrose 5%. 1000 milliLiter(s) (50 mL/Hr) IV Continuous <Continuous>  dextrose 5%. 1000 milliLiter(s) (100 mL/Hr) IV Continuous <Continuous>  dextrose 50% Injectable 25 Gram(s) IV Push once  dextrose 50% Injectable 12.5 Gram(s) IV Push once  dextrose 50% Injectable 25 Gram(s) IV Push once  gabapentin 100 milliGRAM(s) Oral daily  glucagon  Injectable 1 milliGRAM(s) IntraMuscular once  insulin glargine Injectable (LANTUS) 12 Unit(s) SubCutaneous at bedtime  insulin lispro (ADMELOG) corrective regimen sliding scale   SubCutaneous three times a day before meals  insulin lispro (ADMELOG) corrective regimen sliding scale   SubCutaneous at bedtime  insulin lispro Injectable (ADMELOG) 3 Unit(s) SubCutaneous three times a day before meals  labetalol 200 milliGRAM(s) Oral two times a day  levoFLOXacin IVPB      levoFLOXacin IVPB 750 milliGRAM(s) IV Intermittent every 24 hours  lidocaine   4% Patch 1 Patch Transdermal every 24 hours  lisinopril 20 milliGRAM(s) Oral daily  NIFEdipine XL 90 milliGRAM(s) Oral daily  polyethylene glycol 3350 17 Gram(s) Oral daily  sodium chloride 1 Gram(s) Oral three times a day      LABS:  08-28    124<L>  |  90<L>  |  38<H>  ----------------------------<  251<H>  4.6   |  19<L>  |  1.03    Ca    8.6      28 Aug 2024 05:16  Phos  3.5     08-28  Mg     1.80     08-28      Creatinine Trend: 1.03 <--, 0.95 <--, 1.02 <--, 1.01 <--, 1.01 <--, 0.67 <--, 0.88 <--    Phosphorus: 3.5 mg/dL (08-28 @ 05:16)                              10.8   15.84 )-----------( 340      ( 28 Aug 2024 05:16 )             32.0     Urine Studies:  Urinalysis - [08-28-24 @ 05:16]      Color  / Appearance  / SG  / pH       Gluc 251 / Ketone   / Bili  / Urobili        Blood  / Protein  / Leuk Est  / Nitrite       RBC  / WBC  / Hyaline  / Gran  / Sq Epi  / Non Sq Epi  / Bacteria     Urine Sodium 36      [08-21-24 @ 11:20]  Urine Osmolality 496      [08-21-24 @ 11:20]    TSH 1.12      [08-21-24 @ 06:01]  Lipid: chol 139, TG 72, HDL 46, LDL --      [08-22-24 @ 05:10]        RADIOLOGY & ADDITIONAL STUDIES:  
Patient is a 79y old  Female who presents with a chief complaint of Fall (02 Sep 2024 08:11)    Date of servie : 09-02-24 @ 15:11  INTERVAL HPI/OVERNIGHT EVENTS:  T(C): 36.9 (09-02-24 @ 11:12), Max: 36.9 (09-02-24 @ 11:12)  HR: 80 (09-02-24 @ 11:12) (68 - 92)  BP: 146/68 (09-02-24 @ 11:12) (128/53 - 146/68)  RR: 18 (09-02-24 @ 11:12) (18 - 18)  SpO2: 99% (09-02-24 @ 11:12) (98% - 100%)  Wt(kg): --  I&O's Summary    01 Sep 2024 07:01  -  02 Sep 2024 07:00  --------------------------------------------------------  IN: 1570 mL / OUT: 2300 mL / NET: -730 mL    02 Sep 2024 07:01  -  02 Sep 2024 15:11  --------------------------------------------------------  IN: 480 mL / OUT: 500 mL / NET: -20 mL        LABS:                        11.0   14.15 )-----------( 396      ( 02 Sep 2024 03:15 )             31.9     09-02    129<L>  |  92<L>  |  15  ----------------------------<  203<H>  4.4   |  26  |  0.80    Ca    9.3      02 Sep 2024 03:15  Phos  3.3     09-02  Mg     1.70     09-02        Urinalysis Basic - ( 02 Sep 2024 03:15 )    Color: x / Appearance: x / SG: x / pH: x  Gluc: 203 mg/dL / Ketone: x  / Bili: x / Urobili: x   Blood: x / Protein: x / Nitrite: x   Leuk Esterase: x / RBC: x / WBC x   Sq Epi: x / Non Sq Epi: x / Bacteria: x      CAPILLARY BLOOD GLUCOSE      POCT Blood Glucose.: 185 mg/dL (02 Sep 2024 13:04)  POCT Blood Glucose.: 216 mg/dL (02 Sep 2024 09:18)  POCT Blood Glucose.: 214 mg/dL (01 Sep 2024 21:42)  POCT Blood Glucose.: 182 mg/dL (01 Sep 2024 17:40)        Urinalysis Basic - ( 02 Sep 2024 03:15 )    Color: x / Appearance: x / SG: x / pH: x  Gluc: 203 mg/dL / Ketone: x  / Bili: x / Urobili: x   Blood: x / Protein: x / Nitrite: x   Leuk Esterase: x / RBC: x / WBC x   Sq Epi: x / Non Sq Epi: x / Bacteria: x        MEDICATIONS  (STANDING):  aspirin  chewable 81 milliGRAM(s) Oral daily  atorvastatin 40 milliGRAM(s) Oral at bedtime  chlorhexidine 2% Cloths 1 Application(s) Topical daily  dextrose 5%. 1000 milliLiter(s) (50 mL/Hr) IV Continuous <Continuous>  dextrose 5%. 1000 milliLiter(s) (100 mL/Hr) IV Continuous <Continuous>  dextrose 50% Injectable 25 Gram(s) IV Push once  dextrose 50% Injectable 25 Gram(s) IV Push once  dextrose 50% Injectable 12.5 Gram(s) IV Push once  ertapenem  IVPB 1000 milliGRAM(s) IV Intermittent every 24 hours  gabapentin 100 milliGRAM(s) Oral daily  glucagon  Injectable 1 milliGRAM(s) IntraMuscular once  insulin glargine Injectable (LANTUS) 12 Unit(s) SubCutaneous at bedtime  insulin lispro (ADMELOG) corrective regimen sliding scale   SubCutaneous three times a day before meals  insulin lispro (ADMELOG) corrective regimen sliding scale   SubCutaneous at bedtime  insulin lispro Injectable (ADMELOG) 3 Unit(s) SubCutaneous three times a day before meals  labetalol 200 milliGRAM(s) Oral two times a day  lidocaine   4% Patch 1 Patch Transdermal every 24 hours  lisinopril 20 milliGRAM(s) Oral daily  NIFEdipine XL 90 milliGRAM(s) Oral daily  polyethylene glycol 3350 17 Gram(s) Oral two times a day  senna 2 Tablet(s) Oral at bedtime  sodium bicarbonate 650 milliGRAM(s) Oral two times a day    MEDICATIONS  (PRN):  acetaminophen     Tablet .. 650 milliGRAM(s) Oral every 6 hours PRN Temp greater or equal to 38C (100.4F), Mild Pain (1 - 3), Moderate Pain (4 - 6)  aluminum hydroxide/magnesium hydroxide/simethicone Suspension 30 milliLiter(s) Oral every 4 hours PRN Dyspepsia  dextrose Oral Gel 15 Gram(s) Oral once PRN Blood Glucose LESS THAN 70 milliGRAM(s)/deciliter  oxyCODONE    IR 2.5 milliGRAM(s) Oral every 8 hours PRN Severe Pain (7 - 10)  petrolatum white Ointment 1 Application(s) Topical every 8 hours PRN chapped lips          PHYSICAL EXAM:  GENERAL: NAD, well-groomed, well-developed  HEAD:  Atraumatic, Normocephalic  CHEST/LUNG: Clear to percussion bilaterally; No rales, rhonchi, wheezing, or rubs  HEART: Regular rate and rhythm; No murmurs, rubs, or gallops  ABDOMEN: Soft, Nontender, Nondistended; Bowel sounds present  EXTREMITIES:  2+ Peripheral Pulses, No clubbing, cyanosis, or edema  LYMPH: No lymphadenopathy noted  SKIN: No rashes or lesions    Care Discussed with Consultants/Other Providers [ ] YES  [ ] NO
Patient is a 79y old  Female who presents with a chief complaint of Fall (20 Aug 2024 16:06)    Date of servie : 08-20-24 @ 17:35  INTERVAL HPI/OVERNIGHT EVENTS:  T(C): 36.6 (08-20-24 @ 17:25), Max: 36.6 (08-19-24 @ 20:42)  HR: 71 (08-20-24 @ 17:25) (71 - 81)  BP: 184/78 (08-20-24 @ 17:25) (148/63 - 184/78)  RR: 17 (08-20-24 @ 17:25) (16 - 19)  SpO2: 100% (08-20-24 @ 17:25) (99% - 100%)  Wt(kg): --  I&O's Summary      LABS:                        13.8   12.08 )-----------( 340      ( 20 Aug 2024 07:00 )             41.4     08-20    127<L>  |  92<L>  |  12  ----------------------------<  165<H>  4.2   |  17<L>  |  0.60    Ca    10.1      20 Aug 2024 07:00  Phos  3.5     08-20  Mg     1.40     08-20    TPro  7.5  /  Alb  4.2  /  TBili  0.3  /  DBili  x   /  AST  11  /  ALT  9   /  AlkPhos  51  08-19      Urinalysis Basic - ( 20 Aug 2024 07:00 )    Color: x / Appearance: x / SG: x / pH: x  Gluc: 165 mg/dL / Ketone: x  / Bili: x / Urobili: x   Blood: x / Protein: x / Nitrite: x   Leuk Esterase: x / RBC: x / WBC x   Sq Epi: x / Non Sq Epi: x / Bacteria: x      CAPILLARY BLOOD GLUCOSE      POCT Blood Glucose.: 208 mg/dL (20 Aug 2024 17:33)  POCT Blood Glucose.: 152 mg/dL (20 Aug 2024 12:53)  POCT Blood Glucose.: 186 mg/dL (20 Aug 2024 09:16)  POCT Blood Glucose.: 118 mg/dL (19 Aug 2024 22:45)  POCT Blood Glucose.: 182 mg/dL (19 Aug 2024 20:11)        Urinalysis Basic - ( 20 Aug 2024 07:00 )    Color: x / Appearance: x / SG: x / pH: x  Gluc: 165 mg/dL / Ketone: x  / Bili: x / Urobili: x   Blood: x / Protein: x / Nitrite: x   Leuk Esterase: x / RBC: x / WBC x   Sq Epi: x / Non Sq Epi: x / Bacteria: x        MEDICATIONS  (STANDING):  amLODIPine   Tablet 5 milliGRAM(s) Oral daily  dextrose 5%. 1000 milliLiter(s) (50 mL/Hr) IV Continuous <Continuous>  dextrose 5%. 1000 milliLiter(s) (100 mL/Hr) IV Continuous <Continuous>  dextrose 50% Injectable 25 Gram(s) IV Push once  dextrose 50% Injectable 12.5 Gram(s) IV Push once  dextrose 50% Injectable 25 Gram(s) IV Push once  gabapentin 100 milliGRAM(s) Oral daily  glucagon  Injectable 1 milliGRAM(s) IntraMuscular once  insulin lispro (ADMELOG) corrective regimen sliding scale   SubCutaneous three times a day before meals  insulin lispro (ADMELOG) corrective regimen sliding scale   SubCutaneous at bedtime  metoprolol succinate  milliGRAM(s) Oral daily  sodium bicarbonate 650 milliGRAM(s) Oral three times a day  sodium chloride 1 Gram(s) Oral two times a day    MEDICATIONS  (PRN):  acetaminophen     Tablet .. 650 milliGRAM(s) Oral every 6 hours PRN Temp greater or equal to 38C (100.4F), Mild Pain (1 - 3), Moderate Pain (4 - 6), Severe Pain (7 - 10)  dextrose Oral Gel 15 Gram(s) Oral once PRN Blood Glucose LESS THAN 70 milliGRAM(s)/deciliter          PHYSICAL EXAM:  GENERAL: NAD, well-groomed, well-developed  HEAD:  Atraumatic, Normocephalic  CHEST/LUNG: Clear to percussion bilaterally; No rales, rhonchi, wheezing, or rubs  HEART: Regular rate and rhythm; No murmurs, rubs, or gallops  ABDOMEN: Soft, Nontender, Nondistended; Bowel sounds present  EXTREMITIES:  2+ Peripheral Pulses, No clubbing, cyanosis, or edema  LYMPH: No lymphadenopathy noted  SKIN: No rashes or lesions    Care Discussed with Consultants/Other Providers [ ] YES  [ ] NO
Patient is a 79y old  Female who presents with a chief complaint of Fall (21 Aug 2024 11:03)    Date of servie : 08-21-24 @ 13:27  INTERVAL HPI/OVERNIGHT EVENTS:  T(C): 36.3 (08-21-24 @ 10:42), Max: 37.1 (08-20-24 @ 20:25)  HR: 70 (08-21-24 @ 10:42) (70 - 88)  BP: 161/64 (08-21-24 @ 10:42) (136/70 - 184/78)  RR: 17 (08-21-24 @ 10:42) (17 - 19)  SpO2: 100% (08-21-24 @ 10:42) (99% - 100%)  Wt(kg): --  I&O's Summary      LABS:                        12.0   11.65 )-----------( 389      ( 21 Aug 2024 06:01 )             35.8     08-21    127<L>  |  91<L>  |  17  ----------------------------<  184<H>  4.3   |  22  |  0.80    Ca    9.7      21 Aug 2024 06:01  Phos  3.4     08-21  Mg     1.30     08-21        Urinalysis Basic - ( 21 Aug 2024 06:01 )    Color: x / Appearance: x / SG: x / pH: x  Gluc: 184 mg/dL / Ketone: x  / Bili: x / Urobili: x   Blood: x / Protein: x / Nitrite: x   Leuk Esterase: x / RBC: x / WBC x   Sq Epi: x / Non Sq Epi: x / Bacteria: x      CAPILLARY BLOOD GLUCOSE      POCT Blood Glucose.: 187 mg/dL (21 Aug 2024 09:14)  POCT Blood Glucose.: 154 mg/dL (20 Aug 2024 22:32)  POCT Blood Glucose.: 208 mg/dL (20 Aug 2024 17:33)        Urinalysis Basic - ( 21 Aug 2024 06:01 )    Color: x / Appearance: x / SG: x / pH: x  Gluc: 184 mg/dL / Ketone: x  / Bili: x / Urobili: x   Blood: x / Protein: x / Nitrite: x   Leuk Esterase: x / RBC: x / WBC x   Sq Epi: x / Non Sq Epi: x / Bacteria: x        MEDICATIONS  (STANDING):  amLODIPine   Tablet 5 milliGRAM(s) Oral daily  chlorhexidine 2% Cloths 1 Application(s) Topical daily  dextrose 5%. 1000 milliLiter(s) (50 mL/Hr) IV Continuous <Continuous>  dextrose 5%. 1000 milliLiter(s) (100 mL/Hr) IV Continuous <Continuous>  dextrose 50% Injectable 25 Gram(s) IV Push once  dextrose 50% Injectable 25 Gram(s) IV Push once  dextrose 50% Injectable 12.5 Gram(s) IV Push once  gabapentin 100 milliGRAM(s) Oral daily  glucagon  Injectable 1 milliGRAM(s) IntraMuscular once  insulin lispro (ADMELOG) corrective regimen sliding scale   SubCutaneous three times a day before meals  insulin lispro (ADMELOG) corrective regimen sliding scale   SubCutaneous at bedtime  metoprolol succinate  milliGRAM(s) Oral daily  sodium bicarbonate 650 milliGRAM(s) Oral three times a day  sodium chloride 1 Gram(s) Oral two times a day    MEDICATIONS  (PRN):  acetaminophen     Tablet .. 650 milliGRAM(s) Oral every 6 hours PRN Temp greater or equal to 38C (100.4F), Mild Pain (1 - 3), Moderate Pain (4 - 6), Severe Pain (7 - 10)  dextrose Oral Gel 15 Gram(s) Oral once PRN Blood Glucose LESS THAN 70 milliGRAM(s)/deciliter          PHYSICAL EXAM:  GENERAL: NAD, well-groomed, well-developed  HEAD:  Atraumatic, Normocephalic  CHEST/LUNG: Clear to percussion bilaterally; No rales, rhonchi, wheezing, or rubs  HEART: Regular rate and rhythm; No murmurs, rubs, or gallops  ABDOMEN: Soft, Nontender, Nondistended; Bowel sounds present  EXTREMITIES:  2+ Peripheral Pulses, No clubbing, cyanosis, or edema  LYMPH: No lymphadenopathy noted  SKIN: No rashes or lesions    Care Discussed with Consultants/Other Providers [ ] YES  [ ] NO
Patient is a 79y old  Female who presents with a chief complaint of Fall (23 Aug 2024 13:24)    Date of servie : 08-23-24 @ 15:44  INTERVAL HPI/OVERNIGHT EVENTS:  T(C): 36.1 (08-23-24 @ 10:20), Max: 36.5 (08-22-24 @ 22:47)  HR: 61 (08-23-24 @ 10:20) (61 - 75)  BP: 153/51 (08-23-24 @ 10:20) (144/77 - 167/62)  RR: 18 (08-23-24 @ 10:20) (16 - 18)  SpO2: 99% (08-23-24 @ 10:20) (98% - 100%)  Wt(kg): --  I&O's Summary    22 Aug 2024 07:01  -  23 Aug 2024 07:00  --------------------------------------------------------  IN: 0 mL / OUT: 1050 mL / NET: -1050 mL    23 Aug 2024 07:01  -  23 Aug 2024 15:44  --------------------------------------------------------  IN: 0 mL / OUT: 300 mL / NET: -300 mL        LABS:                        11.7   10.82 )-----------( 373      ( 22 Aug 2024 05:10 )             34.7     08-22    131<L>  |  94<L>  |  20  ----------------------------<  177<H>  4.2   |  22  |  0.88    Ca    9.6      22 Aug 2024 05:10  Phos  3.9     08-22  Mg     1.50     08-22        Urinalysis Basic - ( 22 Aug 2024 05:10 )    Color: x / Appearance: x / SG: x / pH: x  Gluc: 177 mg/dL / Ketone: x  / Bili: x / Urobili: x   Blood: x / Protein: x / Nitrite: x   Leuk Esterase: x / RBC: x / WBC x   Sq Epi: x / Non Sq Epi: x / Bacteria: x      CAPILLARY BLOOD GLUCOSE      POCT Blood Glucose.: 222 mg/dL (23 Aug 2024 12:50)  POCT Blood Glucose.: 216 mg/dL (23 Aug 2024 08:48)  POCT Blood Glucose.: 191 mg/dL (22 Aug 2024 22:21)  POCT Blood Glucose.: 103 mg/dL (22 Aug 2024 17:44)        Urinalysis Basic - ( 22 Aug 2024 05:10 )    Color: x / Appearance: x / SG: x / pH: x  Gluc: 177 mg/dL / Ketone: x  / Bili: x / Urobili: x   Blood: x / Protein: x / Nitrite: x   Leuk Esterase: x / RBC: x / WBC x   Sq Epi: x / Non Sq Epi: x / Bacteria: x        MEDICATIONS  (STANDING):  amLODIPine   Tablet 10 milliGRAM(s) Oral daily  aspirin  chewable 81 milliGRAM(s) Oral daily  atorvastatin 40 milliGRAM(s) Oral at bedtime  chlorhexidine 2% Cloths 1 Application(s) Topical daily  dextrose 5%. 1000 milliLiter(s) (50 mL/Hr) IV Continuous <Continuous>  dextrose 5%. 1000 milliLiter(s) (100 mL/Hr) IV Continuous <Continuous>  dextrose 50% Injectable 25 Gram(s) IV Push once  dextrose 50% Injectable 25 Gram(s) IV Push once  dextrose 50% Injectable 12.5 Gram(s) IV Push once  gabapentin 100 milliGRAM(s) Oral daily  glucagon  Injectable 1 milliGRAM(s) IntraMuscular once  insulin lispro (ADMELOG) corrective regimen sliding scale   SubCutaneous three times a day before meals  insulin lispro (ADMELOG) corrective regimen sliding scale   SubCutaneous at bedtime  metoprolol succinate  milliGRAM(s) Oral daily    MEDICATIONS  (PRN):  acetaminophen     Tablet .. 650 milliGRAM(s) Oral every 6 hours PRN Temp greater or equal to 38C (100.4F), Mild Pain (1 - 3), Moderate Pain (4 - 6), Severe Pain (7 - 10)  dextrose Oral Gel 15 Gram(s) Oral once PRN Blood Glucose LESS THAN 70 milliGRAM(s)/deciliter          PHYSICAL EXAM:  GENERAL: NAD, well-groomed, well-developed  HEAD:  Atraumatic, Normocephalic  CHEST/LUNG: Clear to percussion bilaterally; No rales, rhonchi, wheezing, or rubs  HEART: Regular rate and rhythm; No murmurs, rubs, or gallops  ABDOMEN: Soft, Nontender, Nondistended; Bowel sounds present  EXTREMITIES:  2+ Peripheral Pulses, No clubbing, cyanosis, or edema  LYMPH: No lymphadenopathy noted  SKIN: No rashes or lesions    Care Discussed with Consultants/Other Providers [ ] YES  [ ] NO
Patient is a 79y old  Female who presents with a chief complaint of Fall (25 Aug 2024 10:15)    Date of servie : 08-25-24 @ 15:21  INTERVAL HPI/OVERNIGHT EVENTS:  T(C): 36.4 (08-25-24 @ 05:06), Max: 36.8 (08-24-24 @ 17:04)  HR: 70 (08-25-24 @ 05:06) (65 - 72)  BP: 154/63 (08-25-24 @ 05:06) (151/62 - 156/72)  RR: 16 (08-25-24 @ 05:06) (16 - 18)  SpO2: 97% (08-25-24 @ 05:06) (97% - 99%)  Wt(kg): --  I&O's Summary    24 Aug 2024 07:01  -  25 Aug 2024 07:00  --------------------------------------------------------  IN: 0 mL / OUT: 700 mL / NET: -700 mL    25 Aug 2024 07:01  -  25 Aug 2024 15:21  --------------------------------------------------------  IN: 300 mL / OUT: 0 mL / NET: 300 mL        LABS:                        12.7   13.05 )-----------( 408      ( 25 Aug 2024 04:05 )             38.4     08-25    128<L>  |  91<L>  |  29<H>  ----------------------------<  235<H>  4.1   |  24  |  1.01    Ca    9.5      25 Aug 2024 04:05  Phos  3.6     08-25  Mg     1.60     08-25        Urinalysis Basic - ( 25 Aug 2024 04:05 )    Color: x / Appearance: x / SG: x / pH: x  Gluc: 235 mg/dL / Ketone: x  / Bili: x / Urobili: x   Blood: x / Protein: x / Nitrite: x   Leuk Esterase: x / RBC: x / WBC x   Sq Epi: x / Non Sq Epi: x / Bacteria: x      CAPILLARY BLOOD GLUCOSE      POCT Blood Glucose.: 296 mg/dL (25 Aug 2024 12:52)  POCT Blood Glucose.: 275 mg/dL (25 Aug 2024 09:25)  POCT Blood Glucose.: 207 mg/dL (24 Aug 2024 22:33)  POCT Blood Glucose.: 182 mg/dL (24 Aug 2024 17:32)        Urinalysis Basic - ( 25 Aug 2024 04:05 )    Color: x / Appearance: x / SG: x / pH: x  Gluc: 235 mg/dL / Ketone: x  / Bili: x / Urobili: x   Blood: x / Protein: x / Nitrite: x   Leuk Esterase: x / RBC: x / WBC x   Sq Epi: x / Non Sq Epi: x / Bacteria: x        MEDICATIONS  (STANDING):  aspirin  chewable 81 milliGRAM(s) Oral daily  atorvastatin 40 milliGRAM(s) Oral at bedtime  chlorhexidine 2% Cloths 1 Application(s) Topical daily  dextrose 5%. 1000 milliLiter(s) (50 mL/Hr) IV Continuous <Continuous>  dextrose 5%. 1000 milliLiter(s) (100 mL/Hr) IV Continuous <Continuous>  dextrose 50% Injectable 25 Gram(s) IV Push once  dextrose 50% Injectable 25 Gram(s) IV Push once  dextrose 50% Injectable 12.5 Gram(s) IV Push once  gabapentin 100 milliGRAM(s) Oral daily  glucagon  Injectable 1 milliGRAM(s) IntraMuscular once  insulin lispro (ADMELOG) corrective regimen sliding scale   SubCutaneous three times a day before meals  insulin lispro (ADMELOG) corrective regimen sliding scale   SubCutaneous at bedtime  lidocaine   4% Patch 1 Patch Transdermal every 24 hours  metoprolol succinate  milliGRAM(s) Oral daily  mupirocin 2% Ointment 1 Application(s) Topical two times a day  NIFEdipine XL 90 milliGRAM(s) Oral daily  polyethylene glycol 3350 17 Gram(s) Oral daily    MEDICATIONS  (PRN):  acetaminophen     Tablet .. 650 milliGRAM(s) Oral every 6 hours PRN Temp greater or equal to 38C (100.4F), Mild Pain (1 - 3), Moderate Pain (4 - 6)  aluminum hydroxide/magnesium hydroxide/simethicone Suspension 30 milliLiter(s) Oral every 4 hours PRN Dyspepsia  dextrose Oral Gel 15 Gram(s) Oral once PRN Blood Glucose LESS THAN 70 milliGRAM(s)/deciliter  oxyCODONE    IR 2.5 milliGRAM(s) Oral every 8 hours PRN Severe Pain (7 - 10)  petrolatum white Ointment 1 Application(s) Topical every 8 hours PRN chapped lips          PHYSICAL EXAM:  GENERAL: NAD, well-groomed, well-developed  HEAD:  Atraumatic, Normocephalic  CHEST/LUNG: Clear to percussion bilaterally; No rales, rhonchi, wheezing, or rubs  HEART: Regular rate and rhythm; No murmurs, rubs, or gallops  ABDOMEN: Soft, Nontender, Nondistended; Bowel sounds present  EXTREMITIES:  2+ Peripheral Pulses, No clubbing, cyanosis, or edema  LYMPH: No lymphadenopathy noted  SKIN: No rashes or lesions    Care Discussed with Consultants/Other Providers [ ] YES  [ ] NO
Patient is a 79y old  Female who presents with a chief complaint of Fall (26 Aug 2024 10:40)      INTERVAL HPI/OVERNIGHT EVENTS: seen and examined, s/p fall early morning ( found on floor )   denies any ac c/o or any decrease ROM     T(C): 36.4 (24 @ 10:54), Max: 37.2 (24 @ 00:32)  HR: 70 (24 @ 10:54) (64 - 83)  BP: 155/67 (24 @ 10:54) (116/43 - 155/72)  RR: 18 (24 @ 10:54) (16 - 18)  SpO2: 99% (24 @ 10:54) (95% - 100%)  Wt(kg): --  I&O's Summary    25 Aug 2024 07:01  -  26 Aug 2024 07:00  --------------------------------------------------------  IN: 1200 mL / OUT: 402 mL / NET: 798 mL        PAST MEDICAL & SURGICAL HISTORY:  Abdominal obesity      Benign essential hypertension      CAD (coronary atherosclerotic disease)  s/p stent      DM type 2 (diabetes mellitus, type 2)      Dyslipidemia      H/O: hysterectomy      S/P  Section      S/P hemorrhoidectomy      History of cholecystectomy          SOCIAL HISTORY  Alcohol:  Tobacco:  Illicit substance use:    FAMILY HISTORY:    REVIEW OF SYSTEMS:  CONSTITUTIONAL: No fever, weight loss, or fatigue  EYES: No eye pain, visual disturbances, or discharge  ENMT:  No difficulty hearing, tinnitus, vertigo; No sinus or throat pain  NECK: No pain or stiffness  RESPIRATORY: No cough, wheezing, chills or hemoptysis; No shortness of breath  CARDIOVASCULAR: No chest pain, palpitations, dizziness, or leg swelling  GASTROINTESTINAL: No abdominal or epigastric pain. No nausea, vomiting, or hematemesis; No diarrhea or constipation. No melena or hematochezia.  GENITOURINARY: No dysuria, frequency, hematuria, or incontinence  NEUROLOGICAL: No headaches, memory loss, loss of strength, numbness, or tremors  SKIN: No itching, burning, rashes, or lesions   LYMPH NODES: No enlarged glands  ENDOCRINE: No heat or cold intolerance; No hair loss  MUSCULOSKELETAL: No joint pain or swelling; No muscle, back, or extremity pain  PSYCHIATRIC: No depression, anxiety, mood swings, or difficulty sleeping  HEME/LYMPH: No easy bruising, or bleeding gums  ALLERY AND IMMUNOLOGIC: No hives or eczema    RADIOLOGY & ADDITIONAL TESTS:    Imaging Personally Reviewed:  [ ] YES  [ ] NO    Consultant(s) Notes Reviewed:  [ ] YES  [ ] NO    PHYSICAL EXAM:  GENERAL: NAD, well-groomed, well-developed  HEAD:  Atraumatic, Normocephalic  EYES: EOMI, PERRLA, conjunctiva and sclera clear  ENMT: No tonsillar erythema, exudates, or enlargement; Moist mucous membranes, Good dentition, No lesions  NECK: Supple, No JVD, Normal thyroid  NERVOUS SYSTEM:  Alert & Oriented X3, Good concentration; Motor Strength 5/5 B/L upper and lower extremities; DTRs 2+ intact and symmetric  CHEST/LUNG: Clear to percussion bilaterally; No rales, rhonchi, wheezing, or rubs  HEART: Regular rate and rhythm; No murmurs, rubs, or gallops  ABDOMEN: Soft, Nontender, Nondistended; Bowel sounds present  EXTREMITIES:  2+ Peripheral Pulses, No clubbing, cyanosis, or edema  LYMPH: No lymphadenopathy noted  SKIN: No rashes or lesions    LABS:                        13.1   23.37 )-----------( 379      ( 26 Aug 2024 05:41 )             39.0     08-26    123<L>  |  87<L>  |  38<H>  ----------------------------<  320<H>  4.9   |  19<L>  |  1.01    Ca    9.7      26 Aug 2024 05:41  Phos  3.1     08-26  Mg     2.00     08-26        Urinalysis Basic - ( 26 Aug 2024 05:41 )    Color: x / Appearance: x / SG: x / pH: x  Gluc: 320 mg/dL / Ketone: x  / Bili: x / Urobili: x   Blood: x / Protein: x / Nitrite: x   Leuk Esterase: x / RBC: x / WBC x   Sq Epi: x / Non Sq Epi: x / Bacteria: x      CAPILLARY BLOOD GLUCOSE      POCT Blood Glucose.: 306 mg/dL (26 Aug 2024 09:06)  POCT Blood Glucose.: 306 mg/dL (26 Aug 2024 01:02)  POCT Blood Glucose.: 312 mg/dL (25 Aug 2024 22:14)  POCT Blood Glucose.: 253 mg/dL (25 Aug 2024 17:56)  POCT Blood Glucose.: 296 mg/dL (25 Aug 2024 12:52)        Urinalysis Basic - ( 26 Aug 2024 05:41 )    Color: x / Appearance: x / SG: x / pH: x  Gluc: 320 mg/dL / Ketone: x  / Bili: x / Urobili: x   Blood: x / Protein: x / Nitrite: x   Leuk Esterase: x / RBC: x / WBC x   Sq Epi: x / Non Sq Epi: x / Bacteria: x        MEDICATIONS  (STANDING):  aspirin  chewable 81 milliGRAM(s) Oral daily  atorvastatin 40 milliGRAM(s) Oral at bedtime  chlorhexidine 2% Cloths 1 Application(s) Topical daily  dextrose 5%. 1000 milliLiter(s) (50 mL/Hr) IV Continuous <Continuous>  dextrose 5%. 1000 milliLiter(s) (100 mL/Hr) IV Continuous <Continuous>  dextrose 50% Injectable 25 Gram(s) IV Push once  dextrose 50% Injectable 25 Gram(s) IV Push once  dextrose 50% Injectable 12.5 Gram(s) IV Push once  gabapentin 100 milliGRAM(s) Oral daily  glucagon  Injectable 1 milliGRAM(s) IntraMuscular once  insulin glargine Injectable (LANTUS) 8 Unit(s) SubCutaneous at bedtime  insulin lispro (ADMELOG) corrective regimen sliding scale   SubCutaneous three times a day before meals  insulin lispro (ADMELOG) corrective regimen sliding scale   SubCutaneous at bedtime  insulin lispro Injectable (ADMELOG) 3 Unit(s) SubCutaneous three times a day before meals  lidocaine   4% Patch 1 Patch Transdermal every 24 hours  lisinopril 10 milliGRAM(s) Oral daily  metoprolol succinate  milliGRAM(s) Oral daily  mupirocin 2% Ointment 1 Application(s) Topical two times a day  NIFEdipine XL 90 milliGRAM(s) Oral daily  polyethylene glycol 3350 17 Gram(s) Oral daily  sodium chloride 1.5%. 500 milliLiter(s) (50 mL/Hr) IV Continuous <Continuous>    MEDICATIONS  (PRN):  acetaminophen     Tablet .. 650 milliGRAM(s) Oral every 6 hours PRN Temp greater or equal to 38C (100.4F), Mild Pain (1 - 3), Moderate Pain (4 - 6)  aluminum hydroxide/magnesium hydroxide/simethicone Suspension 30 milliLiter(s) Oral every 4 hours PRN Dyspepsia  dextrose Oral Gel 15 Gram(s) Oral once PRN Blood Glucose LESS THAN 70 milliGRAM(s)/deciliter  oxyCODONE    IR 2.5 milliGRAM(s) Oral every 8 hours PRN Severe Pain (7 - 10)  petrolatum white Ointment 1 Application(s) Topical every 8 hours PRN chapped lips      Care Discussed with Consultants/Other Providers [ ] YES  [ ] NO
Patient is a 79y old  Female who presents with a chief complaint of Fall (27 Aug 2024 10:08)      INTERVAL HPI/OVERNIGHT EVENTS: seen and examined, c/o dysuria , and UA is pos   T(C): 37.2 (24 @ 17:15), Max: 37.2 (24 @ 17:15)  HR: 72 (24 @ 17:15) (71 - 103)  BP: 119/54 (24 @ 17:15) (103/63 - 190/79)  RR: 18 (24 @ 17:15) (17 - 18)  SpO2: 100% (24 @ 17:15) (99% - 100%)  Wt(kg): --  I&O's Summary    26 Aug 2024 07:01  -  27 Aug 2024 07:00  --------------------------------------------------------  IN: 1310 mL / OUT: 1450 mL / NET: -140 mL    27 Aug 2024 07:  -  27 Aug 2024 19:20  --------------------------------------------------------  IN: 960 mL / OUT: 760 mL / NET: 200 mL        PAST MEDICAL & SURGICAL HISTORY:  Abdominal obesity      Benign essential hypertension      CAD (coronary atherosclerotic disease)  s/p stent      DM type 2 (diabetes mellitus, type 2)      Dyslipidemia      H/O: hysterectomy      S/P  Section      S/P hemorrhoidectomy      History of cholecystectomy          SOCIAL HISTORY  Alcohol:  Tobacco:  Illicit substance use:    FAMILY HISTORY:    REVIEW OF SYSTEMS:  CONSTITUTIONAL: No fever, weight loss, or fatigue  EYES: No eye pain, visual disturbances, or discharge  ENMT:  No difficulty hearing, tinnitus, vertigo; No sinus or throat pain  NECK: No pain or stiffness  RESPIRATORY: No cough, wheezing, chills or hemoptysis; No shortness of breath  CARDIOVASCULAR: No chest pain, palpitations, dizziness, or leg swelling  GASTROINTESTINAL: No abdominal or epigastric pain. No nausea, vomiting, or hematemesis; No diarrhea or constipation. No melena or hematochezia.  GENITOURINARY: No dysuria, frequency, hematuria, or incontinence  NEUROLOGICAL: No headaches, memory loss, loss of strength, numbness, or tremors  SKIN: No itching, burning, rashes, or lesions   LYMPH NODES: No enlarged glands  ENDOCRINE: No heat or cold intolerance; No hair loss  MUSCULOSKELETAL: No joint pain or swelling; No muscle, back, or extremity pain  PSYCHIATRIC: No depression, anxiety, mood swings, or difficulty sleeping  HEME/LYMPH: No easy bruising, or bleeding gums  ALLERY AND IMMUNOLOGIC: No hives or eczema    RADIOLOGY & ADDITIONAL TESTS:    Imaging Personally Reviewed:  [ ] YES  [ ] NO    Consultant(s) Notes Reviewed:  [ ] YES  [ ] NO    PHYSICAL EXAM:  GENERAL: NAD, well-groomed, well-developed  HEAD:  Atraumatic, Normocephalic  EYES: EOMI, PERRLA, conjunctiva and sclera clear  ENMT: No tonsillar erythema, exudates, or enlargement; Moist mucous membranes, Good dentition, No lesions  NECK: Supple, No JVD, Normal thyroid  NERVOUS SYSTEM:  Alert & Oriented X3, Good concentration; Motor Strength 5/5 B/L upper and lower extremities; DTRs 2+ intact and symmetric  CHEST/LUNG: Clear to percussion bilaterally; No rales, rhonchi, wheezing, or rubs  HEART: Regular rate and rhythm; No murmurs, rubs, or gallops  ABDOMEN: Soft, Nontender, Nondistended; Bowel sounds present  EXTREMITIES:  2+ Peripheral Pulses, No clubbing, cyanosis, or edema  LYMPH: No lymphadenopathy noted  SKIN: No rashes or lesions    LABS:                        11.5   17.96 )-----------( 347      ( 27 Aug 2024 03:05 )             34.2     08-    128<L>  |  93<L>  |  39<H>  ----------------------------<  250<H>  4.6   |  22  |  0.95    Ca    8.7      27 Aug 2024 03:05  Phos  2.6       Mg     2.00             Urinalysis Basic - ( 27 Aug 2024 16:55 )    Color: Yellow / Appearance: Turbid / S.016 / pH: x  Gluc: x / Ketone: Negative mg/dL  / Bili: Negative / Urobili: 0.2 mg/dL   Blood: x / Protein: 30 mg/dL / Nitrite: Positive   Leuk Esterase: Large / RBC: 7 /HPF / WBC 1038 /HPF   Sq Epi: x / Non Sq Epi: 4 /HPF / Bacteria: Many /HPF      CAPILLARY BLOOD GLUCOSE      POCT Blood Glucose.: 254 mg/dL (27 Aug 2024 17:33)  POCT Blood Glucose.: 296 mg/dL (27 Aug 2024 12:16)  POCT Blood Glucose.: 243 mg/dL (27 Aug 2024 09:04)  POCT Blood Glucose.: 335 mg/dL (26 Aug 2024 22:21)        Urinalysis Basic - ( 27 Aug 2024 16:55 )    Color: Yellow / Appearance: Turbid / S.016 / pH: x  Gluc: x / Ketone: Negative mg/dL  / Bili: Negative / Urobili: 0.2 mg/dL   Blood: x / Protein: 30 mg/dL / Nitrite: Positive   Leuk Esterase: Large / RBC: 7 /HPF / WBC 1038 /HPF   Sq Epi: x / Non Sq Epi: 4 /HPF / Bacteria: Many /HPF        MEDICATIONS  (STANDING):  aspirin  chewable 81 milliGRAM(s) Oral daily  atorvastatin 40 milliGRAM(s) Oral at bedtime  chlorhexidine 2% Cloths 1 Application(s) Topical daily  dextrose 5%. 1000 milliLiter(s) (50 mL/Hr) IV Continuous <Continuous>  dextrose 5%. 1000 milliLiter(s) (100 mL/Hr) IV Continuous <Continuous>  dextrose 50% Injectable 25 Gram(s) IV Push once  dextrose 50% Injectable 12.5 Gram(s) IV Push once  dextrose 50% Injectable 25 Gram(s) IV Push once  gabapentin 100 milliGRAM(s) Oral daily  glucagon  Injectable 1 milliGRAM(s) IntraMuscular once  insulin glargine Injectable (LANTUS) 12 Unit(s) SubCutaneous at bedtime  insulin lispro (ADMELOG) corrective regimen sliding scale   SubCutaneous three times a day before meals  insulin lispro (ADMELOG) corrective regimen sliding scale   SubCutaneous at bedtime  insulin lispro Injectable (ADMELOG) 3 Unit(s) SubCutaneous three times a day before meals  labetalol 200 milliGRAM(s) Oral two times a day  levoFLOXacin IVPB      lidocaine   4% Patch 1 Patch Transdermal every 24 hours  lisinopril 20 milliGRAM(s) Oral daily  mupirocin 2% Ointment 1 Application(s) Topical two times a day  NIFEdipine XL 90 milliGRAM(s) Oral daily  polyethylene glycol 3350 17 Gram(s) Oral daily  sodium chloride 1 Gram(s) Oral three times a day    MEDICATIONS  (PRN):  acetaminophen     Tablet .. 650 milliGRAM(s) Oral every 6 hours PRN Temp greater or equal to 38C (100.4F), Mild Pain (1 - 3), Moderate Pain (4 - 6)  aluminum hydroxide/magnesium hydroxide/simethicone Suspension 30 milliLiter(s) Oral every 4 hours PRN Dyspepsia  dextrose Oral Gel 15 Gram(s) Oral once PRN Blood Glucose LESS THAN 70 milliGRAM(s)/deciliter  oxyCODONE    IR 2.5 milliGRAM(s) Oral every 8 hours PRN Severe Pain (7 - 10)  petrolatum white Ointment 1 Application(s) Topical every 8 hours PRN chapped lips      Care Discussed with Consultants/Other Providers [ ] YES  [ ] NO
[facility] [place] [facility]   MD MAURIZIO CLARK MD ANGELA WONG, PA    TEL:  OFFICE: [phone]  From 5pm-7am Answering Service [phone]    -- RENAL FOLLOW UP NOTE ---Date of Service 08-26-24 @ 10:40    Patient is a 79y old  Female who presents with a chief complaint of Fall (26 Aug 2024 06:57)      Patient seen and examined at bedside. No chest pain/sob    VITALS:  T(F): 98.6 (08-26-24 @ 07:31), Max: 99 (08-26-24 @ 00:32)  HR: 83 (08-26-24 @ 07:31)  BP: 125/61 (08-26-24 @ 07:31)  RR: 18 (08-26-24 @ 07:31)  SpO2: 98% (08-26-24 @ 07:31)  Wt(kg): --    08-25 @ 07:01  -  08-26 @ 07:00  --------------------------------------------------------  IN: 1200 mL / OUT: 402 mL / NET: 798 mL          PHYSICAL EXAM:  General: NAD  Neck: No JVD  Respiratory: CTAB, no wheezes, rales or rhonchi  Cardiovascular: S1, S2, RRR  Gastrointestinal: BS+, soft, NT/ND  Extremities: No peripheral edema    Hospital Medications:   MEDICATIONS  (STANDING):  aspirin  chewable 81 milliGRAM(s) Oral daily  atorvastatin 40 milliGRAM(s) Oral at bedtime  chlorhexidine 2% Cloths 1 Application(s) Topical daily  dextrose 5%. 1000 milliLiter(s) (50 mL/Hr) IV Continuous <Continuous>  dextrose 5%. 1000 milliLiter(s) (100 mL/Hr) IV Continuous <Continuous>  dextrose 50% Injectable 25 Gram(s) IV Push once  dextrose 50% Injectable 25 Gram(s) IV Push once  dextrose 50% Injectable 12.5 Gram(s) IV Push once  gabapentin 100 milliGRAM(s) Oral daily  glucagon  Injectable 1 milliGRAM(s) IntraMuscular once  insulin glargine Injectable (LANTUS) 8 Unit(s) SubCutaneous at bedtime  insulin lispro (ADMELOG) corrective regimen sliding scale   SubCutaneous at bedtime  insulin lispro (ADMELOG) corrective regimen sliding scale   SubCutaneous three times a day before meals  insulin lispro Injectable (ADMELOG) 3 Unit(s) SubCutaneous three times a day before meals  lidocaine   4% Patch 1 Patch Transdermal every 24 hours  lisinopril 10 milliGRAM(s) Oral daily  metoprolol succinate  milliGRAM(s) Oral daily  mupirocin 2% Ointment 1 Application(s) Topical two times a day  NIFEdipine XL 90 milliGRAM(s) Oral daily  polyethylene glycol 3350 17 Gram(s) Oral daily  sodium chloride 1.5%. 500 milliLiter(s) (50 mL/Hr) IV Continuous <Continuous>      LABS:  08-26    123<L>  |  87<L>  |  38<H>  ----------------------------<  320<H>  4.9   |  19<L>  |  1.01    Ca    9.7      26 Aug 2024 05:41  Phos  3.1     08-26  Mg     2.00     08-26      Creatinine Trend: 1.01 <--, 1.01 <--, 0.67 <--, 0.88 <--, 0.80 <--, 0.60 <--, 0.90 <--    Phosphorus: 3.1 mg/dL (08-26 @ 05:41)                              13.1   23.37 )-----------( 379      ( 26 Aug 2024 05:41 )             39.0     Urine Studies:  Urinalysis - [08-26-24 @ 05:41]      Color  / Appearance  / SG  / pH       Gluc 320 / Ketone   / Bili  / Urobili        Blood  / Protein  / Leuk Est  / Nitrite       RBC  / WBC  / Hyaline  / Gran  / Sq Epi  / Non Sq Epi  / Bacteria     Urine Sodium 36      [08-21-24 @ 11:20]  Urine Osmolality 496      [08-21-24 @ 11:20]    TSH 1.12      [08-21-24 @ 06:01]  Lipid: chol 139, TG 72, HDL 46, LDL --      [08-22-24 @ 05:10]        RADIOLOGY & ADDITIONAL STUDIES:  
    Subjective: Patient seen and examined. No new events except as noted.     SUBJECTIVE/ROS:  No chest pain, dyspnea, palpitation, or dizziness.       MEDICATIONS:  MEDICATIONS  (STANDING):  amLODIPine   Tablet 10 milliGRAM(s) Oral daily  aspirin  chewable 81 milliGRAM(s) Oral daily  atorvastatin 40 milliGRAM(s) Oral at bedtime  chlorhexidine 2% Cloths 1 Application(s) Topical daily  dextrose 5%. 1000 milliLiter(s) (50 mL/Hr) IV Continuous <Continuous>  dextrose 5%. 1000 milliLiter(s) (100 mL/Hr) IV Continuous <Continuous>  dextrose 50% Injectable 25 Gram(s) IV Push once  dextrose 50% Injectable 25 Gram(s) IV Push once  dextrose 50% Injectable 12.5 Gram(s) IV Push once  gabapentin 100 milliGRAM(s) Oral daily  glucagon  Injectable 1 milliGRAM(s) IntraMuscular once  insulin lispro (ADMELOG) corrective regimen sliding scale   SubCutaneous three times a day before meals  insulin lispro (ADMELOG) corrective regimen sliding scale   SubCutaneous at bedtime  metoprolol succinate  milliGRAM(s) Oral daily  mupirocin 2% Ointment 1 Application(s) Topical two times a day      PHYSICAL EXAM:  T(C): 36.4 (08-24-24 @ 05:10), Max: 36.7 (08-23-24 @ 21:30)  HR: 58 (08-24-24 @ 06:45) (57 - 64)  BP: 190/58 (08-24-24 @ 06:45) (153/51 - 192/76)  RR: 18 (08-24-24 @ 05:10) (18 - 18)  SpO2: 96% (08-24-24 @ 05:10) (96% - 100%)  Wt(kg): --  I&O's Summary    23 Aug 2024 07:01  -  24 Aug 2024 07:00  --------------------------------------------------------  IN: 340 mL / OUT: 800 mL / NET: -460 mL            JVP: Normal  Neck: supple  Lung: clear   CV: S1 S2 , Murmur:  Abd: soft  Ext: No edema  neuro: Awake / alert  Psych: flat affect  Skin: normal``    LABS/DATA:    CARDIAC MARKERS:                                13.3   11.28 )-----------( 320      ( 24 Aug 2024 06:00 )             40.7     08-24    128<L>  |  93<L>  |  18  ----------------------------<  180<H>  4.2   |  18<L>  |  0.67    Ca    9.5      24 Aug 2024 06:00  Phos  3.6     08-24  Mg     1.60     08-24      proBNP:   Lipid Profile:   HgA1c:   TSH:     TELE:  EKG:        
    Subjective: Patient seen and examined. No new events except as noted.     SUBJECTIVE/ROS:  nad      MEDICATIONS:  MEDICATIONS  (STANDING):  amLODIPine   Tablet 5 milliGRAM(s) Oral daily  chlorhexidine 2% Cloths 1 Application(s) Topical daily  dextrose 5%. 1000 milliLiter(s) (50 mL/Hr) IV Continuous <Continuous>  dextrose 5%. 1000 milliLiter(s) (100 mL/Hr) IV Continuous <Continuous>  dextrose 50% Injectable 25 Gram(s) IV Push once  dextrose 50% Injectable 25 Gram(s) IV Push once  dextrose 50% Injectable 12.5 Gram(s) IV Push once  gabapentin 100 milliGRAM(s) Oral daily  glucagon  Injectable 1 milliGRAM(s) IntraMuscular once  insulin lispro (ADMELOG) corrective regimen sliding scale   SubCutaneous three times a day before meals  insulin lispro (ADMELOG) corrective regimen sliding scale   SubCutaneous at bedtime  metoprolol succinate  milliGRAM(s) Oral daily  sodium bicarbonate 650 milliGRAM(s) Oral three times a day  sodium chloride 1 Gram(s) Oral two times a day      PHYSICAL EXAM:  T(C): 37.1 (08-20-24 @ 20:25), Max: 37.1 (08-20-24 @ 20:25)  HR: 88 (08-20-24 @ 20:55) (71 - 88)  BP: 153/67 (08-20-24 @ 20:55) (136/70 - 184/78)  RR: 19 (08-20-24 @ 20:55) (17 - 19)  SpO2: 99% (08-20-24 @ 20:25) (99% - 100%)  Wt(kg): --  I&O's Summary          JVP: Normal  Neck: supple  Lung: clear   CV: S1 S2 , Murmur:  Abd: soft  Ext: No edema  neuro: Awake / alert  Psych: flat affect  Skin: normal``    LABS/DATA:    CARDIAC MARKERS:                                12.0   11.65 )-----------( 389      ( 21 Aug 2024 06:01 )             35.8     08-20    127<L>  |  92<L>  |  12  ----------------------------<  165<H>  4.2   |  17<L>  |  0.60    Ca    10.1      20 Aug 2024 07:00  Phos  3.5     08-20  Mg     1.40     08-20    TPro  7.5  /  Alb  4.2  /  TBili  0.3  /  DBili  x   /  AST  11  /  ALT  9   /  AlkPhos  51  08-19    proBNP:   Lipid Profile:   HgA1c:   TSH: Thyroid Stimulating Hormone, Serum: 1.12 uIU/mL (08-21 @ 06:01)      TELE:  EKG:        
    Subjective: Patient seen and examined. No new events except as noted.     SUBJECTIVE/ROS:  nad      MEDICATIONS:  MEDICATIONS  (STANDING):  aspirin  chewable 81 milliGRAM(s) Oral daily  atorvastatin 40 milliGRAM(s) Oral at bedtime  chlorhexidine 2% Cloths 1 Application(s) Topical daily  dextrose 5%. 1000 milliLiter(s) (50 mL/Hr) IV Continuous <Continuous>  dextrose 5%. 1000 milliLiter(s) (100 mL/Hr) IV Continuous <Continuous>  dextrose 50% Injectable 25 Gram(s) IV Push once  dextrose 50% Injectable 12.5 Gram(s) IV Push once  dextrose 50% Injectable 25 Gram(s) IV Push once  ertapenem  IVPB 1000 milliGRAM(s) IV Intermittent every 24 hours  gabapentin 100 milliGRAM(s) Oral daily  glucagon  Injectable 1 milliGRAM(s) IntraMuscular once  insulin glargine Injectable (LANTUS) 12 Unit(s) SubCutaneous at bedtime  insulin lispro (ADMELOG) corrective regimen sliding scale   SubCutaneous at bedtime  insulin lispro (ADMELOG) corrective regimen sliding scale   SubCutaneous three times a day before meals  insulin lispro Injectable (ADMELOG) 3 Unit(s) SubCutaneous three times a day before meals  labetalol 200 milliGRAM(s) Oral two times a day  lidocaine   4% Patch 1 Patch Transdermal every 24 hours  lisinopril 20 milliGRAM(s) Oral daily  NIFEdipine XL 90 milliGRAM(s) Oral daily  polyethylene glycol 3350 17 Gram(s) Oral two times a day  senna 2 Tablet(s) Oral at bedtime  sodium bicarbonate 650 milliGRAM(s) Oral two times a day      PHYSICAL EXAM:  T(C): 36.7 (09-02-24 @ 06:30), Max: 36.8 (09-01-24 @ 21:15)  HR: 92 (09-02-24 @ 06:30) (68 - 92)  BP: 142/61 (09-02-24 @ 06:30) (120/75 - 142/61)  RR: 18 (09-02-24 @ 06:30) (18 - 18)  SpO2: 98% (09-02-24 @ 06:30) (98% - 100%)  Wt(kg): --  I&O's Summary    01 Sep 2024 07:01  -  02 Sep 2024 07:00  --------------------------------------------------------  IN: 1570 mL / OUT: 2300 mL / NET: -730 mL            JVP: Normal  Neck: supple  Lung: clear   CV: S1 S2 , Murmur:  Abd: soft  Ext: No edema  neuro: Awake / alert  Psych: flat affect  Skin: normal``    LABS/DATA:    CARDIAC MARKERS:                                11.0   14.15 )-----------( 396      ( 02 Sep 2024 03:15 )             31.9     09-02    129<L>  |  92<L>  |  15  ----------------------------<  203<H>  4.4   |  26  |  0.80    Ca    9.3      02 Sep 2024 03:15  Phos  3.3     09-02  Mg     1.70     09-02      proBNP:   Lipid Profile:   HgA1c:   TSH:     TELE:  EKG:        
    Subjective: Patient seen and examined. No new events except as noted.     SUBJECTIVE/ROS:  nad      MEDICATIONS:  MEDICATIONS  (STANDING):  aspirin  chewable 81 milliGRAM(s) Oral daily  atorvastatin 40 milliGRAM(s) Oral at bedtime  chlorhexidine 2% Cloths 1 Application(s) Topical daily  dextrose 5%. 1000 milliLiter(s) (50 mL/Hr) IV Continuous <Continuous>  dextrose 5%. 1000 milliLiter(s) (100 mL/Hr) IV Continuous <Continuous>  dextrose 50% Injectable 25 Gram(s) IV Push once  dextrose 50% Injectable 12.5 Gram(s) IV Push once  dextrose 50% Injectable 25 Gram(s) IV Push once  gabapentin 100 milliGRAM(s) Oral daily  glucagon  Injectable 1 milliGRAM(s) IntraMuscular once  insulin glargine Injectable (LANTUS) 12 Unit(s) SubCutaneous at bedtime  insulin lispro (ADMELOG) corrective regimen sliding scale   SubCutaneous at bedtime  insulin lispro (ADMELOG) corrective regimen sliding scale   SubCutaneous three times a day before meals  insulin lispro Injectable (ADMELOG) 3 Unit(s) SubCutaneous three times a day before meals  labetalol 200 milliGRAM(s) Oral two times a day  levoFLOXacin IVPB      levoFLOXacin IVPB 750 milliGRAM(s) IV Intermittent every 24 hours  lidocaine   4% Patch 1 Patch Transdermal every 24 hours  lisinopril 20 milliGRAM(s) Oral daily  NIFEdipine XL 90 milliGRAM(s) Oral daily  polyethylene glycol 3350 17 Gram(s) Oral daily  sodium bicarbonate 650 milliGRAM(s) Oral two times a day      PHYSICAL EXAM:  T(C): 36.8 (08-29-24 @ 06:17), Max: 37.4 (08-28-24 @ 17:26)  HR: 79 (08-29-24 @ 06:17) (73 - 79)  BP: 133/58 (08-29-24 @ 06:17) (122/43 - 165/66)  RR: 18 (08-29-24 @ 06:17) (18 - 18)  SpO2: 99% (08-29-24 @ 06:17) (96% - 100%)  Wt(kg): --  I&O's Summary    28 Aug 2024 07:01  -  29 Aug 2024 07:00  --------------------------------------------------------  IN: 600 mL / OUT: 1450 mL / NET: -850 mL            JVP: Normal  Neck: supple  Lung: clear   CV: S1 S2 , Murmur:  Abd: soft  Ext: No edema  neuro: Awake / alert  Psych: flat affect  Skin: normal``    LABS/DATA:    CARDIAC MARKERS:                                10.4   12.67 )-----------( 332      ( 29 Aug 2024 06:30 )             31.3     08-29    127<L>  |  95<L>  |  36<H>  ----------------------------<  200<H>  4.8   |  22  |  1.15    Ca    9.0      29 Aug 2024 06:30  Phos  4.0     08-29  Mg     1.80     08-29      proBNP:   Lipid Profile:   HgA1c:   TSH:     TELE:  EKG:        
    Subjective: Patient seen and examined. No new events except as noted.     SUBJECTIVE/ROS:  nad      MEDICATIONS:  MEDICATIONS  (STANDING):  aspirin  chewable 81 milliGRAM(s) Oral daily  atorvastatin 40 milliGRAM(s) Oral at bedtime  chlorhexidine 2% Cloths 1 Application(s) Topical daily  dextrose 5%. 1000 milliLiter(s) (50 mL/Hr) IV Continuous <Continuous>  dextrose 5%. 1000 milliLiter(s) (100 mL/Hr) IV Continuous <Continuous>  dextrose 50% Injectable 25 Gram(s) IV Push once  dextrose 50% Injectable 12.5 Gram(s) IV Push once  dextrose 50% Injectable 25 Gram(s) IV Push once  gabapentin 100 milliGRAM(s) Oral daily  glucagon  Injectable 1 milliGRAM(s) IntraMuscular once  insulin glargine Injectable (LANTUS) 12 Unit(s) SubCutaneous at bedtime  insulin lispro (ADMELOG) corrective regimen sliding scale   SubCutaneous at bedtime  insulin lispro (ADMELOG) corrective regimen sliding scale   SubCutaneous three times a day before meals  insulin lispro Injectable (ADMELOG) 3 Unit(s) SubCutaneous three times a day before meals  labetalol 200 milliGRAM(s) Oral two times a day  levoFLOXacin IVPB      levoFLOXacin IVPB 750 milliGRAM(s) IV Intermittent every 24 hours  lidocaine   4% Patch 1 Patch Transdermal every 24 hours  lisinopril 20 milliGRAM(s) Oral daily  NIFEdipine XL 90 milliGRAM(s) Oral daily  polyethylene glycol 3350 17 Gram(s) Oral daily  sodium chloride 1 Gram(s) Oral three times a day      PHYSICAL EXAM:  T(C): 36.6 (08-28-24 @ 05:41), Max: 37.2 (08-27-24 @ 17:15)  HR: 64 (08-28-24 @ 05:41) (61 - 72)  BP: 135/59 (08-28-24 @ 05:41) (97/50 - 135/59)  RR: 18 (08-28-24 @ 05:41) (17 - 18)  SpO2: 100% (08-28-24 @ 05:41) (99% - 100%)  Wt(kg): --  I&O's Summary    27 Aug 2024 07:01  -  28 Aug 2024 07:00  --------------------------------------------------------  IN: 960 mL / OUT: 760 mL / NET: 200 mL    28 Aug 2024 07:01  -  28 Aug 2024 07:29  --------------------------------------------------------  IN: 0 mL / OUT: 350 mL / NET: -350 mL            JVP: Normal  Neck: supple  Lung: clear   CV: S1 S2 , Murmur:  Abd: soft  Ext: No edema  neuro: Awake / alert  Psych: flat affect  Skin: normal``    LABS/DATA:    CARDIAC MARKERS:                                10.8   15.84 )-----------( 340      ( 28 Aug 2024 05:16 )             32.0     08-27    128<L>  |  93<L>  |  39<H>  ----------------------------<  250<H>  4.6   |  22  |  0.95    Ca    8.7      27 Aug 2024 03:05  Phos  2.6     08-27  Mg     2.00     08-27      proBNP:   Lipid Profile:   HgA1c:   TSH:     TELE:  EKG:        
    Subjective: Patient seen and examined. No new events except as noted.     SUBJECTIVE/ROS:  nad      MEDICATIONS:  MEDICATIONS  (STANDING):  aspirin  chewable 81 milliGRAM(s) Oral daily  atorvastatin 40 milliGRAM(s) Oral at bedtime  chlorhexidine 2% Cloths 1 Application(s) Topical daily  dextrose 5%. 1000 milliLiter(s) (50 mL/Hr) IV Continuous <Continuous>  dextrose 5%. 1000 milliLiter(s) (100 mL/Hr) IV Continuous <Continuous>  dextrose 50% Injectable 25 Gram(s) IV Push once  dextrose 50% Injectable 12.5 Gram(s) IV Push once  dextrose 50% Injectable 25 Gram(s) IV Push once  gabapentin 100 milliGRAM(s) Oral daily  glucagon  Injectable 1 milliGRAM(s) IntraMuscular once  insulin lispro (ADMELOG) corrective regimen sliding scale   SubCutaneous at bedtime  insulin lispro (ADMELOG) corrective regimen sliding scale   SubCutaneous three times a day before meals  lidocaine   4% Patch 1 Patch Transdermal every 24 hours  metoprolol succinate  milliGRAM(s) Oral daily  mupirocin 2% Ointment 1 Application(s) Topical two times a day  NIFEdipine XL 90 milliGRAM(s) Oral daily      PHYSICAL EXAM:  T(C): 36.4 (08-25-24 @ 05:06), Max: 36.8 (08-24-24 @ 17:04)  HR: 70 (08-25-24 @ 05:06) (62 - 72)  BP: 154/63 (08-25-24 @ 05:06) (147/66 - 156/72)  RR: 16 (08-25-24 @ 05:06) (16 - 18)  SpO2: 97% (08-25-24 @ 05:06) (97% - 99%)  Wt(kg): --  I&O's Summary    24 Aug 2024 07:01  -  25 Aug 2024 07:00  --------------------------------------------------------  IN: 0 mL / OUT: 700 mL / NET: -700 mL            JVP: Normal  Neck: supple  Lung: clear   CV: S1 S2 , Murmur:  Abd: soft  Ext: No edema  neuro: Awake / alert  Psych: flat affect  Skin: normal``    LABS/DATA:    CARDIAC MARKERS:                                12.7   13.05 )-----------( 408      ( 25 Aug 2024 04:05 )             38.4     08-25    128<L>  |  91<L>  |  29<H>  ----------------------------<  235<H>  4.1   |  24  |  1.01    Ca    9.5      25 Aug 2024 04:05  Phos  3.6     08-25  Mg     1.60     08-25      proBNP:   Lipid Profile:   HgA1c:   TSH:     TELE:  EKG:        
    Subjective: Patient seen and examined. No new events except as noted.     SUBJECTIVE/ROS:  nad    MEDICATIONS:  MEDICATIONS  (STANDING):  aspirin  chewable 81 milliGRAM(s) Oral daily  atorvastatin 40 milliGRAM(s) Oral at bedtime  chlorhexidine 2% Cloths 1 Application(s) Topical daily  dextrose 5%. 1000 milliLiter(s) (50 mL/Hr) IV Continuous <Continuous>  dextrose 5%. 1000 milliLiter(s) (100 mL/Hr) IV Continuous <Continuous>  dextrose 50% Injectable 25 Gram(s) IV Push once  dextrose 50% Injectable 12.5 Gram(s) IV Push once  dextrose 50% Injectable 25 Gram(s) IV Push once  gabapentin 100 milliGRAM(s) Oral daily  glucagon  Injectable 1 milliGRAM(s) IntraMuscular once  insulin lispro (ADMELOG) corrective regimen sliding scale   SubCutaneous three times a day before meals  insulin lispro (ADMELOG) corrective regimen sliding scale   SubCutaneous at bedtime  lidocaine   4% Patch 1 Patch Transdermal every 24 hours  lisinopril 10 milliGRAM(s) Oral daily  metoprolol succinate  milliGRAM(s) Oral daily  mupirocin 2% Ointment 1 Application(s) Topical two times a day  NIFEdipine XL 90 milliGRAM(s) Oral daily  polyethylene glycol 3350 17 Gram(s) Oral daily      PHYSICAL EXAM:  T(C): 37.2 (08-26-24 @ 00:32), Max: 37.2 (08-26-24 @ 00:32)  HR: 65 (08-26-24 @ 00:32) (64 - 69)  BP: 130/62 (08-26-24 @ 00:32) (116/43 - 155/72)  RR: 16 (08-26-24 @ 00:32) (16 - 18)  SpO2: 100% (08-26-24 @ 00:32) (95% - 100%)  Wt(kg): --  I&O's Summary    24 Aug 2024 07:01  -  25 Aug 2024 07:00  --------------------------------------------------------  IN: 0 mL / OUT: 700 mL / NET: -700 mL    25 Aug 2024 07:01  -  26 Aug 2024 06:57  --------------------------------------------------------  IN: 1200 mL / OUT: 400 mL / NET: 800 mL            JVP: Normal  Neck: supple  Lung: clear   CV: S1 S2 , Murmur:  Abd: soft  Ext: No edema  neuro: Awake / alert  Psych: flat affect  Skin: normal``    LABS/DATA:    CARDIAC MARKERS:                                12.7   13.05 )-----------( 408      ( 25 Aug 2024 04:05 )             38.4     08-25    128<L>  |  91<L>  |  29<H>  ----------------------------<  235<H>  4.1   |  24  |  1.01    Ca    9.5      25 Aug 2024 04:05  Phos  3.6     08-25  Mg     1.60     08-25      proBNP:   Lipid Profile:   HgA1c:   TSH:     TELE:  EKG:        
  Dr. Glenna Rueda     Office [phone] (9 am to 5 pm)  Service: [phone] (5pm to 9am)        RENAL PROGRESS NOTE: DATE OF SERVICE 08-25-24 @ 10:15    Patient is a 79y old  Female who presents with a chief complaint of Fall (25 Aug 2024 07:08)      Patient seen and examined at bedside. No chest pain/sob    VITALS:  T(F): 97.5 (08-25-24 @ 05:06), Max: 98.2 (08-24-24 @ 17:04)  HR: 70 (08-25-24 @ 05:06)  BP: 154/63 (08-25-24 @ 05:06)  RR: 16 (08-25-24 @ 05:06)  SpO2: 97% (08-25-24 @ 05:06)  Wt(kg): --    08-24 @ 07:01  -  08-25 @ 07:00  --------------------------------------------------------  IN: 0 mL / OUT: 700 mL / NET: -700 mL          PHYSICAL EXAM:  Constitutional: NAD  Neck: No JVD  Respiratory: CTAB, no wheezes, rales or rhonchi  Cardiovascular: S1, S2, RRR  Gastrointestinal: BS+, soft, NT/ND  Extremities: No peripheral edema    Hospital Medications:   MEDICATIONS  (STANDING):  aspirin  chewable 81 milliGRAM(s) Oral daily  atorvastatin 40 milliGRAM(s) Oral at bedtime  chlorhexidine 2% Cloths 1 Application(s) Topical daily  dextrose 5%. 1000 milliLiter(s) (50 mL/Hr) IV Continuous <Continuous>  dextrose 5%. 1000 milliLiter(s) (100 mL/Hr) IV Continuous <Continuous>  dextrose 50% Injectable 25 Gram(s) IV Push once  dextrose 50% Injectable 12.5 Gram(s) IV Push once  dextrose 50% Injectable 25 Gram(s) IV Push once  gabapentin 100 milliGRAM(s) Oral daily  glucagon  Injectable 1 milliGRAM(s) IntraMuscular once  insulin lispro (ADMELOG) corrective regimen sliding scale   SubCutaneous three times a day before meals  insulin lispro (ADMELOG) corrective regimen sliding scale   SubCutaneous at bedtime  lidocaine   4% Patch 1 Patch Transdermal every 24 hours  metoprolol succinate  milliGRAM(s) Oral daily  mupirocin 2% Ointment 1 Application(s) Topical two times a day  NIFEdipine XL 90 milliGRAM(s) Oral daily      LABS:  08-25    128<L>  |  91<L>  |  29<H>  ----------------------------<  235<H>  4.1   |  24  |  1.01    Ca    9.5      25 Aug 2024 04:05  Phos  3.6     08-25  Mg     1.60     08-25      Creatinine Trend: 1.01 <--, 0.67 <--, 0.88 <--, 0.80 <--, 0.60 <--, 0.90 <--    Phosphorus: 3.6 mg/dL (08-25 @ 04:05)                              12.7   13.05 )-----------( 408      ( 25 Aug 2024 04:05 )             38.4     Urine Studies:  Urinalysis - [08-25-24 @ 04:05]      Color  / Appearance  / SG  / pH       Gluc 235 / Ketone   / Bili  / Urobili        Blood  / Protein  / Leuk Est  / Nitrite       RBC  / WBC  / Hyaline  / Gran  / Sq Epi  / Non Sq Epi  / Bacteria     Urine Sodium 36      [08-21-24 @ 11:20]  Urine Osmolality 496      [08-21-24 @ 11:20]    TSH 1.12      [08-21-24 @ 06:01]  Lipid: chol 139, TG 72, HDL 46, LDL --      [08-22-24 @ 05:10]        RADIOLOGY & ADDITIONAL STUDIES:  
Patient is a 79y old  Female who presents with a chief complaint of Fall (01 Sep 2024 14:38)    Date of servie : 09-01-24 @ 16:09  INTERVAL HPI/OVERNIGHT EVENTS:  T(C): 36.6 (09-01-24 @ 10:29), Max: 37.3 (08-31-24 @ 21:10)  HR: 72 (09-01-24 @ 10:29) (72 - 77)  BP: 120/75 (09-01-24 @ 10:29) (120/75 - 153/62)  RR: 18 (09-01-24 @ 10:29) (18 - 18)  SpO2: 100% (09-01-24 @ 10:29) (100% - 100%)  Wt(kg): --  I&O's Summary    31 Aug 2024 07:01  -  01 Sep 2024 07:00  --------------------------------------------------------  IN: 940 mL / OUT: 825 mL / NET: 115 mL    01 Sep 2024 07:01  -  01 Sep 2024 16:09  --------------------------------------------------------  IN: 600 mL / OUT: 0 mL / NET: 600 mL        LABS:                        10.9   14.91 )-----------( 340      ( 01 Sep 2024 03:50 )             32.7     09-01    132<L>  |  96<L>  |  16  ----------------------------<  165<H>  4.4   |  20<L>  |  0.91    Ca    9.1      01 Sep 2024 03:50  Phos  3.4     09-01  Mg     1.70     09-01        Urinalysis Basic - ( 01 Sep 2024 03:50 )    Color: x / Appearance: x / SG: x / pH: x  Gluc: 165 mg/dL / Ketone: x  / Bili: x / Urobili: x   Blood: x / Protein: x / Nitrite: x   Leuk Esterase: x / RBC: x / WBC x   Sq Epi: x / Non Sq Epi: x / Bacteria: x      CAPILLARY BLOOD GLUCOSE      POCT Blood Glucose.: 298 mg/dL (01 Sep 2024 13:31)  POCT Blood Glucose.: 231 mg/dL (01 Sep 2024 09:14)  POCT Blood Glucose.: 169 mg/dL (31 Aug 2024 21:25)  POCT Blood Glucose.: 241 mg/dL (31 Aug 2024 17:50)        Urinalysis Basic - ( 01 Sep 2024 03:50 )    Color: x / Appearance: x / SG: x / pH: x  Gluc: 165 mg/dL / Ketone: x  / Bili: x / Urobili: x   Blood: x / Protein: x / Nitrite: x   Leuk Esterase: x / RBC: x / WBC x   Sq Epi: x / Non Sq Epi: x / Bacteria: x        MEDICATIONS  (STANDING):  aspirin  chewable 81 milliGRAM(s) Oral daily  atorvastatin 40 milliGRAM(s) Oral at bedtime  chlorhexidine 2% Cloths 1 Application(s) Topical daily  dextrose 5%. 1000 milliLiter(s) (50 mL/Hr) IV Continuous <Continuous>  dextrose 5%. 1000 milliLiter(s) (100 mL/Hr) IV Continuous <Continuous>  dextrose 50% Injectable 25 Gram(s) IV Push once  dextrose 50% Injectable 25 Gram(s) IV Push once  dextrose 50% Injectable 12.5 Gram(s) IV Push once  ertapenem  IVPB 1000 milliGRAM(s) IV Intermittent every 24 hours  gabapentin 100 milliGRAM(s) Oral daily  glucagon  Injectable 1 milliGRAM(s) IntraMuscular once  insulin glargine Injectable (LANTUS) 12 Unit(s) SubCutaneous at bedtime  insulin lispro (ADMELOG) corrective regimen sliding scale   SubCutaneous at bedtime  insulin lispro (ADMELOG) corrective regimen sliding scale   SubCutaneous three times a day before meals  insulin lispro Injectable (ADMELOG) 3 Unit(s) SubCutaneous three times a day before meals  labetalol 200 milliGRAM(s) Oral two times a day  lidocaine   4% Patch 1 Patch Transdermal every 24 hours  lisinopril 20 milliGRAM(s) Oral daily  NIFEdipine XL 90 milliGRAM(s) Oral daily  polyethylene glycol 3350 17 Gram(s) Oral two times a day  senna 2 Tablet(s) Oral at bedtime  sodium bicarbonate 650 milliGRAM(s) Oral two times a day    MEDICATIONS  (PRN):  acetaminophen     Tablet .. 650 milliGRAM(s) Oral every 6 hours PRN Temp greater or equal to 38C (100.4F), Mild Pain (1 - 3), Moderate Pain (4 - 6)  aluminum hydroxide/magnesium hydroxide/simethicone Suspension 30 milliLiter(s) Oral every 4 hours PRN Dyspepsia  dextrose Oral Gel 15 Gram(s) Oral once PRN Blood Glucose LESS THAN 70 milliGRAM(s)/deciliter  oxyCODONE    IR 2.5 milliGRAM(s) Oral every 8 hours PRN Severe Pain (7 - 10)  petrolatum white Ointment 1 Application(s) Topical every 8 hours PRN chapped lips          PHYSICAL EXAM:  GENERAL: NAD, well-groomed, well-developed  HEAD:  Atraumatic, Normocephalic  CHEST/LUNG: Clear to percussion bilaterally; No rales, rhonchi, wheezing, or rubs  HEART: Regular rate and rhythm; No murmurs, rubs, or gallops  ABDOMEN: Soft, Nontender, Nondistended; Bowel sounds present  EXTREMITIES:  2+ Peripheral Pulses, No clubbing, cyanosis, or edema  LYMPH: No lymphadenopathy noted  SKIN: No rashes or lesions    Care Discussed with Consultants/Other Providers [ ] YES  [ ] NO

## 2024-12-15 NOTE — H&P ADULT - NSVTERISKASSESS_GEN_ALL_CORE FT
With ginger hematuria  Urine analysis not reflexed to culture  Initiated ceftriaxone awaiting sensitivities   Medical Assessment Completed on: 19-Aug-2024 22:22

## 2025-01-02 NOTE — ED ADULT NURSE NOTE - BEFAST ARM SIDE DRIFT
No [de-identified] : Back / Spine: Inspection: No erythema and ecchymosis. Palpation: right gluteal and lumbar paraspinal tenderness.  Range of motion:  Near full range of motion but with mild stiffness.  Strength Testing: motor exam is 5/5 throughout both lower extremities with normal tone Neurological testing: light touch is intact throughout both lower extremities Straight Leg Raise: neg [place] test: neg bilaterally  R hip  gluteal tenderness no pain with hip rotation, no pain with hip log rolling full ROM of hip